# Patient Record
Sex: MALE | Race: WHITE | Employment: OTHER | URBAN - METROPOLITAN AREA
[De-identification: names, ages, dates, MRNs, and addresses within clinical notes are randomized per-mention and may not be internally consistent; named-entity substitution may affect disease eponyms.]

---

## 2020-01-01 ENCOUNTER — APPOINTMENT (OUTPATIENT)
Dept: GENERAL RADIOLOGY | Age: 85
DRG: 871 | End: 2020-01-01
Payer: MEDICARE

## 2020-01-01 ENCOUNTER — APPOINTMENT (OUTPATIENT)
Dept: CT IMAGING | Age: 85
DRG: 871 | End: 2020-01-01
Payer: MEDICARE

## 2020-01-01 ENCOUNTER — HOSPITAL ENCOUNTER (INPATIENT)
Age: 85
LOS: 15 days | Discharge: NURSING, REHAB OR LONG-TERM CARE | DRG: 871 | End: 2021-01-09
Attending: EMERGENCY MEDICINE | Admitting: INTERNAL MEDICINE
Payer: MEDICARE

## 2020-01-01 ENCOUNTER — TELEPHONE (OUTPATIENT)
Dept: OTHER | Facility: CLINIC | Age: 85
End: 2020-01-01

## 2020-01-01 ENCOUNTER — HOSPITAL ENCOUNTER (INPATIENT)
Age: 85
LOS: 4 days | Discharge: NURSING, REHAB OR LONG-TERM CARE | DRG: 871 | End: 2020-12-18
Attending: EMERGENCY MEDICINE | Admitting: INTERNAL MEDICINE
Payer: MEDICARE

## 2020-01-01 VITALS
OXYGEN SATURATION: 95 % | WEIGHT: 187.39 LBS | HEIGHT: 75 IN | SYSTOLIC BLOOD PRESSURE: 111 MMHG | RESPIRATION RATE: 20 BRPM | BODY MASS INDEX: 23.3 KG/M2 | DIASTOLIC BLOOD PRESSURE: 63 MMHG | HEART RATE: 90 BPM | TEMPERATURE: 97.8 F

## 2020-01-01 DIAGNOSIS — D68.32 WARFARIN-INDUCED COAGULOPATHY (HCC): ICD-10-CM

## 2020-01-01 DIAGNOSIS — R77.8 ELEVATED TROPONIN: ICD-10-CM

## 2020-01-01 DIAGNOSIS — I48.0 PAROXYSMAL ATRIAL FIBRILLATION (HCC): ICD-10-CM

## 2020-01-01 DIAGNOSIS — T45.515A WARFARIN-INDUCED COAGULOPATHY (HCC): ICD-10-CM

## 2020-01-01 DIAGNOSIS — J18.9 PNEUMONIA DUE TO ORGANISM: Primary | ICD-10-CM

## 2020-01-01 LAB
-: ABNORMAL
ABSOLUTE EOS #: 0 K/UL (ref 0–0.4)
ABSOLUTE EOS #: 0 K/UL (ref 0–0.4)
ABSOLUTE EOS #: 0.06 K/UL (ref 0–0.44)
ABSOLUTE EOS #: 0.18 K/UL (ref 0–0.44)
ABSOLUTE EOS #: 0.23 K/UL (ref 0–0.44)
ABSOLUTE EOS #: 0.27 K/UL (ref 0–0.44)
ABSOLUTE EOS #: 0.28 K/UL (ref 0–0.44)
ABSOLUTE IMMATURE GRANULOCYTE: 0.18 K/UL (ref 0–0.3)
ABSOLUTE IMMATURE GRANULOCYTE: 0.26 K/UL (ref 0–0.3)
ABSOLUTE IMMATURE GRANULOCYTE: 0.27 K/UL (ref 0–0.3)
ABSOLUTE IMMATURE GRANULOCYTE: 0.32 K/UL (ref 0–0.3)
ABSOLUTE IMMATURE GRANULOCYTE: 0.44 K/UL (ref 0–0.3)
ABSOLUTE IMMATURE GRANULOCYTE: ABNORMAL K/UL (ref 0–0.3)
ABSOLUTE IMMATURE GRANULOCYTE: ABNORMAL K/UL (ref 0–0.3)
ABSOLUTE LYMPH #: 1.18 K/UL (ref 1.1–3.7)
ABSOLUTE LYMPH #: 1.25 K/UL (ref 1–4.8)
ABSOLUTE LYMPH #: 1.27 K/UL (ref 1.1–3.7)
ABSOLUTE LYMPH #: 1.27 K/UL (ref 1.1–3.7)
ABSOLUTE LYMPH #: 1.37 K/UL (ref 1.1–3.7)
ABSOLUTE LYMPH #: 1.4 K/UL (ref 1–4.8)
ABSOLUTE LYMPH #: 1.53 K/UL (ref 1.1–3.7)
ABSOLUTE MONO #: 0.36 K/UL (ref 0.1–0.8)
ABSOLUTE MONO #: 0.36 K/UL (ref 0.1–1.2)
ABSOLUTE MONO #: 0.36 K/UL (ref 0.1–1.2)
ABSOLUTE MONO #: 0.41 K/UL (ref 0.1–1.2)
ABSOLUTE MONO #: 0.44 K/UL (ref 0.1–1.2)
ABSOLUTE MONO #: 0.45 K/UL (ref 0.1–1.2)
ABSOLUTE MONO #: 0.7 K/UL (ref 0.1–1.2)
ALBUMIN SERPL-MCNC: 1.8 G/DL (ref 3.5–5.2)
ALBUMIN SERPL-MCNC: 2 G/DL (ref 3.5–5.2)
ALBUMIN SERPL-MCNC: 2.1 G/DL (ref 3.5–5.2)
ALBUMIN SERPL-MCNC: 2.2 G/DL (ref 3.5–5.2)
ALBUMIN SERPL-MCNC: 2.2 G/DL (ref 3.5–5.2)
ALBUMIN SERPL-MCNC: 3.1 G/DL (ref 3.5–5.2)
ALBUMIN SERPL-MCNC: 3.1 G/DL (ref 3.5–5.2)
ALBUMIN/GLOBULIN RATIO: 0.9 (ref 1–2.5)
ALBUMIN/GLOBULIN RATIO: 0.9 (ref 1–2.5)
ALP BLD-CCNC: 106 U/L (ref 40–129)
ALP BLD-CCNC: 90 U/L (ref 40–129)
ALT SERPL-CCNC: 12 U/L (ref 5–41)
ALT SERPL-CCNC: 28 U/L (ref 5–41)
AMORPHOUS: ABNORMAL
ANION GAP SERPL CALCULATED.3IONS-SCNC: 10 MMOL/L (ref 9–17)
ANION GAP SERPL CALCULATED.3IONS-SCNC: 11 MMOL/L (ref 9–17)
ANION GAP SERPL CALCULATED.3IONS-SCNC: 13 MMOL/L (ref 9–17)
ANION GAP SERPL CALCULATED.3IONS-SCNC: 15 MMOL/L (ref 9–17)
ANION GAP SERPL CALCULATED.3IONS-SCNC: 5 MMOL/L (ref 9–17)
ANION GAP SERPL CALCULATED.3IONS-SCNC: 8 MMOL/L (ref 9–17)
ANION GAP SERPL CALCULATED.3IONS-SCNC: 9 MMOL/L (ref 9–17)
AST SERPL-CCNC: 20 U/L
AST SERPL-CCNC: 27 U/L
BACTERIA: ABNORMAL
BASOPHILS # BLD: 0 % (ref 0–2)
BASOPHILS # BLD: 1 % (ref 0–2)
BASOPHILS ABSOLUTE: 0 K/UL (ref 0–0.2)
BASOPHILS ABSOLUTE: 0 K/UL (ref 0–0.2)
BASOPHILS ABSOLUTE: 0.03 K/UL (ref 0–0.2)
BASOPHILS ABSOLUTE: 0.04 K/UL (ref 0–0.2)
BASOPHILS ABSOLUTE: 0.06 K/UL (ref 0–0.2)
BASOPHILS ABSOLUTE: 0.07 K/UL (ref 0–0.2)
BASOPHILS ABSOLUTE: 0.09 K/UL (ref 0–0.2)
BILIRUB SERPL-MCNC: 0.63 MG/DL (ref 0.3–1.2)
BILIRUB SERPL-MCNC: 0.78 MG/DL (ref 0.3–1.2)
BILIRUBIN URINE: NEGATIVE
BNP INTERPRETATION: ABNORMAL
BNP INTERPRETATION: ABNORMAL
BUN BLDV-MCNC: 16 MG/DL (ref 8–23)
BUN BLDV-MCNC: 16 MG/DL (ref 8–23)
BUN BLDV-MCNC: 17 MG/DL (ref 8–23)
BUN BLDV-MCNC: 17 MG/DL (ref 8–23)
BUN BLDV-MCNC: 18 MG/DL (ref 8–23)
BUN BLDV-MCNC: 19 MG/DL (ref 8–23)
BUN BLDV-MCNC: 20 MG/DL (ref 8–23)
BUN BLDV-MCNC: 22 MG/DL (ref 8–23)
BUN BLDV-MCNC: 23 MG/DL (ref 8–23)
BUN BLDV-MCNC: 24 MG/DL (ref 8–23)
BUN BLDV-MCNC: 25 MG/DL (ref 8–23)
BUN BLDV-MCNC: 30 MG/DL (ref 8–23)
BUN/CREAT BLD: ABNORMAL (ref 9–20)
CALCIUM SERPL-MCNC: 8.4 MG/DL (ref 8.6–10.4)
CALCIUM SERPL-MCNC: 8.5 MG/DL (ref 8.6–10.4)
CALCIUM SERPL-MCNC: 8.7 MG/DL (ref 8.6–10.4)
CALCIUM SERPL-MCNC: 8.8 MG/DL (ref 8.6–10.4)
CALCIUM SERPL-MCNC: 8.8 MG/DL (ref 8.6–10.4)
CALCIUM SERPL-MCNC: 9 MG/DL (ref 8.6–10.4)
CALCIUM SERPL-MCNC: 9.2 MG/DL (ref 8.6–10.4)
CALCIUM SERPL-MCNC: 9.2 MG/DL (ref 8.6–10.4)
CALCIUM SERPL-MCNC: 9.3 MG/DL (ref 8.6–10.4)
CALCIUM SERPL-MCNC: 9.4 MG/DL (ref 8.6–10.4)
CALCIUM SERPL-MCNC: 9.5 MG/DL (ref 8.6–10.4)
CALCIUM SERPL-MCNC: 9.7 MG/DL (ref 8.6–10.4)
CASTS UA: ABNORMAL /LPF
CHLORIDE BLD-SCNC: 100 MMOL/L (ref 98–107)
CHLORIDE BLD-SCNC: 100 MMOL/L (ref 98–107)
CHLORIDE BLD-SCNC: 101 MMOL/L (ref 98–107)
CHLORIDE BLD-SCNC: 102 MMOL/L (ref 98–107)
CHLORIDE BLD-SCNC: 102 MMOL/L (ref 98–107)
CHLORIDE BLD-SCNC: 104 MMOL/L (ref 98–107)
CHLORIDE BLD-SCNC: 94 MMOL/L (ref 98–107)
CHLORIDE BLD-SCNC: 95 MMOL/L (ref 98–107)
CHLORIDE BLD-SCNC: 98 MMOL/L (ref 98–107)
CHLORIDE BLD-SCNC: 98 MMOL/L (ref 98–107)
CHLORIDE BLD-SCNC: 99 MMOL/L (ref 98–107)
CHLORIDE BLD-SCNC: 99 MMOL/L (ref 98–107)
CO2: 25 MMOL/L (ref 20–31)
CO2: 26 MMOL/L (ref 20–31)
CO2: 27 MMOL/L (ref 20–31)
CO2: 28 MMOL/L (ref 20–31)
CO2: 29 MMOL/L (ref 20–31)
CO2: 30 MMOL/L (ref 20–31)
CO2: 32 MMOL/L (ref 20–31)
CO2: 33 MMOL/L (ref 20–31)
COLOR: YELLOW
COMMENT UA: ABNORMAL
CREAT SERPL-MCNC: 0.72 MG/DL (ref 0.7–1.2)
CREAT SERPL-MCNC: 0.74 MG/DL (ref 0.7–1.2)
CREAT SERPL-MCNC: 0.74 MG/DL (ref 0.7–1.2)
CREAT SERPL-MCNC: 0.82 MG/DL (ref 0.7–1.2)
CREAT SERPL-MCNC: 0.83 MG/DL (ref 0.7–1.2)
CREAT SERPL-MCNC: 0.85 MG/DL (ref 0.7–1.2)
CREAT SERPL-MCNC: 0.85 MG/DL (ref 0.7–1.2)
CREAT SERPL-MCNC: 0.88 MG/DL (ref 0.7–1.2)
CREAT SERPL-MCNC: 0.94 MG/DL (ref 0.7–1.2)
CREAT SERPL-MCNC: 0.96 MG/DL (ref 0.7–1.2)
CREAT SERPL-MCNC: 0.99 MG/DL (ref 0.7–1.2)
CREAT SERPL-MCNC: 1.16 MG/DL (ref 0.7–1.2)
CRYSTALS, UA: ABNORMAL /HPF
CULTURE: ABNORMAL
CULTURE: NORMAL
D-DIMER QUANTITATIVE: 1.04 MG/L FEU
DIFFERENTIAL TYPE: ABNORMAL
DIGOXIN DATE LAST DOSE: NORMAL
DIGOXIN DATE LAST DOSE: NORMAL
DIGOXIN DOSE AMOUNT: NORMAL
DIGOXIN DOSE AMOUNT: NORMAL
DIGOXIN DOSE TIME: NORMAL
DIGOXIN DOSE TIME: NORMAL
DIGOXIN LEVEL: 0.8 NG/ML (ref 0.5–2)
DIGOXIN LEVEL: 0.8 NG/ML (ref 0.5–2)
EKG ATRIAL RATE: 65 BPM
EKG P AXIS: 111 DEGREES
EKG P-R INTERVAL: 226 MS
EKG Q-T INTERVAL: 458 MS
EKG QRS DURATION: 86 MS
EKG QTC CALCULATION (BAZETT): 476 MS
EKG R AXIS: -15 DEGREES
EKG T AXIS: -65 DEGREES
EKG VENTRICULAR RATE: 65 BPM
EOSINOPHILS RELATIVE PERCENT: 0 % (ref 1–4)
EOSINOPHILS RELATIVE PERCENT: 1 % (ref 1–4)
EOSINOPHILS RELATIVE PERCENT: 2 % (ref 1–4)
EOSINOPHILS RELATIVE PERCENT: 2 % (ref 1–4)
EOSINOPHILS RELATIVE PERCENT: 3 % (ref 1–4)
EPITHELIAL CELLS UA: ABNORMAL /HPF (ref 0–5)
FERRITIN: 837 UG/L (ref 30–400)
FOLATE: 5.5 NG/ML
GFR AFRICAN AMERICAN: >60 ML/MIN
GFR NON-AFRICAN AMERICAN: >60 ML/MIN
GFR SERPL CREATININE-BSD FRML MDRD: ABNORMAL ML/MIN/{1.73_M2}
GLUCOSE BLD-MCNC: 100 MG/DL (ref 70–99)
GLUCOSE BLD-MCNC: 103 MG/DL (ref 70–99)
GLUCOSE BLD-MCNC: 104 MG/DL (ref 70–99)
GLUCOSE BLD-MCNC: 107 MG/DL (ref 70–99)
GLUCOSE BLD-MCNC: 108 MG/DL (ref 70–99)
GLUCOSE BLD-MCNC: 111 MG/DL (ref 70–99)
GLUCOSE BLD-MCNC: 114 MG/DL (ref 70–99)
GLUCOSE BLD-MCNC: 116 MG/DL (ref 70–99)
GLUCOSE BLD-MCNC: 126 MG/DL (ref 75–110)
GLUCOSE BLD-MCNC: 127 MG/DL (ref 75–110)
GLUCOSE BLD-MCNC: 128 MG/DL (ref 70–99)
GLUCOSE BLD-MCNC: 133 MG/DL (ref 70–99)
GLUCOSE BLD-MCNC: 146 MG/DL (ref 70–99)
GLUCOSE BLD-MCNC: 96 MG/DL (ref 70–99)
GLUCOSE BLD-MCNC: 98 MG/DL (ref 75–110)
GLUCOSE URINE: NEGATIVE
HCT VFR BLD CALC: 27.9 % (ref 40.7–50.3)
HCT VFR BLD CALC: 27.9 % (ref 41–53)
HCT VFR BLD CALC: 28 % (ref 41–53)
HCT VFR BLD CALC: 28.5 % (ref 40.7–50.3)
HCT VFR BLD CALC: 29.3 % (ref 41–53)
HCT VFR BLD CALC: 31.3 % (ref 40.7–50.3)
HCT VFR BLD CALC: 31.4 % (ref 40.7–50.3)
HCT VFR BLD CALC: 31.6 % (ref 40.7–50.3)
HCT VFR BLD CALC: 31.7 % (ref 41–53)
HCT VFR BLD CALC: 32 % (ref 41–53)
HCT VFR BLD CALC: 32.1 % (ref 40.7–50.3)
HCT VFR BLD CALC: 33.7 % (ref 41–53)
HEMOGLOBIN: 10.3 G/DL (ref 13.5–17.5)
HEMOGLOBIN: 10.3 G/DL (ref 13.5–17.5)
HEMOGLOBIN: 10.8 G/DL (ref 13.5–17.5)
HEMOGLOBIN: 8.5 G/DL (ref 13–17)
HEMOGLOBIN: 9 G/DL (ref 13.5–17.5)
HEMOGLOBIN: 9 G/DL (ref 13.5–17.5)
HEMOGLOBIN: 9 G/DL (ref 13–17)
HEMOGLOBIN: 9 G/DL (ref 13–17)
HEMOGLOBIN: 9.1 G/DL (ref 13.5–17.5)
HEMOGLOBIN: 9.5 G/DL (ref 13–17)
HEMOGLOBIN: 9.5 G/DL (ref 13–17)
HEMOGLOBIN: 9.6 G/DL (ref 13–17)
IMMATURE GRANULOCYTES: 1 %
IMMATURE GRANULOCYTES: 2 %
IMMATURE GRANULOCYTES: 2 %
IMMATURE GRANULOCYTES: 4 %
IMMATURE GRANULOCYTES: 4 %
IMMATURE GRANULOCYTES: ABNORMAL %
IMMATURE GRANULOCYTES: ABNORMAL %
INR BLD: 1.1
INR BLD: 1.2
INR BLD: 1.2
INR BLD: 1.3
INR BLD: 1.4
INR BLD: 1.7
INR BLD: 1.9
INR BLD: 1.9
INR BLD: 2.1
INR BLD: 2.1
INR BLD: 2.3
INR BLD: >14.8
INTERVENTION: NORMAL
IRON SATURATION: 23 % (ref 20–55)
IRON: 25 UG/DL (ref 59–158)
KETONES, URINE: NEGATIVE
LACTIC ACID, SEPSIS WHOLE BLOOD: 2 MMOL/L (ref 0.5–1.9)
LACTIC ACID, SEPSIS WHOLE BLOOD: 3.5 MMOL/L (ref 0.5–1.9)
LACTIC ACID, SEPSIS WHOLE BLOOD: NORMAL MMOL/L (ref 0.5–1.9)
LACTIC ACID, SEPSIS: 1.1 MMOL/L (ref 0.5–1.9)
LACTIC ACID, SEPSIS: 1.2 MMOL/L (ref 0.5–1.9)
LACTIC ACID, SEPSIS: 1.9 MMOL/L (ref 0.5–1.9)
LACTIC ACID, SEPSIS: ABNORMAL MMOL/L (ref 0.5–1.9)
LACTIC ACID, SEPSIS: ABNORMAL MMOL/L (ref 0.5–1.9)
LACTIC ACID, WHOLE BLOOD: 1.7 MMOL/L (ref 0.7–2.1)
LEGIONELLA PNEUMOPHILIA AG, URINE: NEGATIVE
LEUKOCYTE ESTERASE, URINE: NEGATIVE
LYMPHOCYTES # BLD: 11 % (ref 24–43)
LYMPHOCYTES # BLD: 11 % (ref 24–43)
LYMPHOCYTES # BLD: 14 % (ref 24–43)
LYMPHOCYTES # BLD: 7 % (ref 24–44)
LYMPHOCYTES # BLD: 7 % (ref 24–44)
LYMPHOCYTES # BLD: 8 % (ref 24–43)
LYMPHOCYTES # BLD: 8 % (ref 24–43)
Lab: ABNORMAL
Lab: ABNORMAL
Lab: NORMAL
MAGNESIUM: 2 MG/DL (ref 1.6–2.6)
MAGNESIUM: 2.1 MG/DL (ref 1.6–2.6)
MAGNESIUM: 2.1 MG/DL (ref 1.6–2.6)
MAGNESIUM: 2.2 MG/DL (ref 1.6–2.6)
MAGNESIUM: 2.2 MG/DL (ref 1.6–2.6)
MAGNESIUM: 2.3 MG/DL (ref 1.6–2.6)
MAGNESIUM: 2.6 MG/DL (ref 1.6–2.6)
MCH RBC QN AUTO: 28.5 PG (ref 25.2–33.5)
MCH RBC QN AUTO: 28.5 PG (ref 26–34)
MCH RBC QN AUTO: 28.7 PG (ref 25.2–33.5)
MCH RBC QN AUTO: 28.8 PG (ref 26–34)
MCH RBC QN AUTO: 29 PG (ref 25.2–33.5)
MCH RBC QN AUTO: 29 PG (ref 25.2–33.5)
MCH RBC QN AUTO: 29 PG (ref 26–34)
MCH RBC QN AUTO: 29.2 PG (ref 26–34)
MCH RBC QN AUTO: 29.3 PG (ref 25.2–33.5)
MCH RBC QN AUTO: 29.5 PG (ref 25.2–33.5)
MCHC RBC AUTO-ENTMCNC: 28.8 G/DL (ref 28.4–34.8)
MCHC RBC AUTO-ENTMCNC: 29.6 G/DL (ref 28.4–34.8)
MCHC RBC AUTO-ENTMCNC: 30.1 G/DL (ref 28.4–34.8)
MCHC RBC AUTO-ENTMCNC: 30.5 G/DL (ref 28.4–34.8)
MCHC RBC AUTO-ENTMCNC: 30.6 G/DL (ref 28.4–34.8)
MCHC RBC AUTO-ENTMCNC: 31.1 G/DL (ref 31–37)
MCHC RBC AUTO-ENTMCNC: 31.6 G/DL (ref 28.4–34.8)
MCHC RBC AUTO-ENTMCNC: 32.1 G/DL (ref 31–37)
MCHC RBC AUTO-ENTMCNC: 32.1 G/DL (ref 31–37)
MCHC RBC AUTO-ENTMCNC: 32.3 G/DL (ref 31–37)
MCHC RBC AUTO-ENTMCNC: 32.3 G/DL (ref 31–37)
MCHC RBC AUTO-ENTMCNC: 32.6 G/DL (ref 31–37)
MCV RBC AUTO: 101 FL (ref 82.6–102.9)
MCV RBC AUTO: 88.6 FL (ref 80–100)
MCV RBC AUTO: 89.1 FL (ref 80–100)
MCV RBC AUTO: 89.5 FL (ref 80–100)
MCV RBC AUTO: 90.4 FL (ref 80–100)
MCV RBC AUTO: 90.5 FL (ref 80–100)
MCV RBC AUTO: 91.8 FL (ref 80–100)
MCV RBC AUTO: 93.4 FL (ref 82.6–102.9)
MCV RBC AUTO: 94.9 FL (ref 82.6–102.9)
MCV RBC AUTO: 94.9 FL (ref 82.6–102.9)
MCV RBC AUTO: 96.2 FL (ref 82.6–102.9)
MCV RBC AUTO: 97 FL (ref 82.6–102.9)
MONOCYTES # BLD: 2 % (ref 1–7)
MONOCYTES # BLD: 2 % (ref 3–12)
MONOCYTES # BLD: 3 % (ref 2–11)
MONOCYTES # BLD: 3 % (ref 3–12)
MONOCYTES # BLD: 4 % (ref 3–12)
MORPHOLOGY: NORMAL
MUCUS: ABNORMAL
MYCOPLASMA PNEUMONIAE IGM: 0.08
NITRITE, URINE: NEGATIVE
NRBC AUTOMATED: 0 PER 100 WBC
NRBC AUTOMATED: ABNORMAL PER 100 WBC
OTHER OBSERVATIONS UA: ABNORMAL
PARTIAL THROMBOPLASTIN TIME: 37.4 SEC (ref 21.3–31.3)
PARTIAL THROMBOPLASTIN TIME: 84.4 SEC (ref 21.3–31.3)
PDW BLD-RTO: 18.6 % (ref 12.5–15.4)
PDW BLD-RTO: 18.7 % (ref 12.5–15.4)
PDW BLD-RTO: 18.9 % (ref 12.5–15.4)
PDW BLD-RTO: 19.2 % (ref 11.8–14.4)
PDW BLD-RTO: 19.5 % (ref 11.8–14.4)
PDW BLD-RTO: 19.6 % (ref 11.8–14.4)
PDW BLD-RTO: 19.6 % (ref 12.5–15.4)
PDW BLD-RTO: 19.7 % (ref 11.8–14.4)
PDW BLD-RTO: 19.8 % (ref 11.8–14.4)
PDW BLD-RTO: 20.1 % (ref 11.8–14.4)
PH UA: 5.5 (ref 5–8)
PHOSPHORUS: 2.5 MG/DL (ref 2.5–4.5)
PHOSPHORUS: 2.7 MG/DL (ref 2.5–4.5)
PHOSPHORUS: 2.8 MG/DL (ref 2.5–4.5)
PHOSPHORUS: 2.8 MG/DL (ref 2.5–4.5)
PHOSPHORUS: 3 MG/DL (ref 2.5–4.5)
PLATELET # BLD: 227 K/UL (ref 138–453)
PLATELET # BLD: 250 K/UL (ref 138–453)
PLATELET # BLD: 257 K/UL (ref 138–453)
PLATELET # BLD: 273 K/UL (ref 138–453)
PLATELET # BLD: 286 K/UL (ref 138–453)
PLATELET # BLD: 320 K/UL (ref 138–453)
PLATELET # BLD: 339 K/UL (ref 140–450)
PLATELET # BLD: 395 K/UL (ref 140–450)
PLATELET # BLD: 416 K/UL (ref 140–450)
PLATELET # BLD: 429 K/UL (ref 140–450)
PLATELET # BLD: 456 K/UL (ref 140–450)
PLATELET # BLD: 464 K/UL (ref 140–450)
PLATELET ESTIMATE: ABNORMAL
PMV BLD AUTO: 10.1 FL (ref 8.1–13.5)
PMV BLD AUTO: 10.2 FL (ref 8.1–13.5)
PMV BLD AUTO: 10.6 FL (ref 8.1–13.5)
PMV BLD AUTO: 10.9 FL (ref 8.1–13.5)
PMV BLD AUTO: 11 FL (ref 8.1–13.5)
PMV BLD AUTO: 11 FL (ref 8.1–13.5)
PMV BLD AUTO: 7.4 FL (ref 6–12)
PMV BLD AUTO: 7.6 FL (ref 6–12)
PMV BLD AUTO: 7.8 FL (ref 6–12)
PMV BLD AUTO: 7.8 FL (ref 6–12)
PMV BLD AUTO: 7.9 FL (ref 6–12)
PMV BLD AUTO: 8.2 FL (ref 6–12)
POTASSIUM SERPL-SCNC: 3 MMOL/L (ref 3.7–5.3)
POTASSIUM SERPL-SCNC: 3.4 MMOL/L (ref 3.7–5.3)
POTASSIUM SERPL-SCNC: 3.5 MMOL/L (ref 3.7–5.3)
POTASSIUM SERPL-SCNC: 3.7 MMOL/L (ref 3.7–5.3)
POTASSIUM SERPL-SCNC: 3.8 MMOL/L (ref 3.7–5.3)
POTASSIUM SERPL-SCNC: 3.9 MMOL/L (ref 3.7–5.3)
POTASSIUM SERPL-SCNC: 4 MMOL/L (ref 3.7–5.3)
POTASSIUM SERPL-SCNC: 4.1 MMOL/L (ref 3.7–5.3)
PRO-BNP: 488 PG/ML
PRO-BNP: 615 PG/ML
PROCALCITONIN: 0.18 NG/ML
PROCALCITONIN: 0.21 NG/ML
PROTEIN UA: NEGATIVE
PROTHROMBIN TIME: 12 SEC (ref 9–12)
PROTHROMBIN TIME: 12.1 SEC (ref 9–12)
PROTHROMBIN TIME: 12.4 SEC (ref 9–12)
PROTHROMBIN TIME: 13.5 SEC (ref 9.4–12.6)
PROTHROMBIN TIME: 13.5 SEC (ref 9–12)
PROTHROMBIN TIME: 13.7 SEC (ref 9.4–12.6)
PROTHROMBIN TIME: 14.6 SEC (ref 9–12)
PROTHROMBIN TIME: 17.8 SEC (ref 9.4–12.6)
PROTHROMBIN TIME: 19.9 SEC (ref 9.4–12.6)
PROTHROMBIN TIME: 20.1 SEC (ref 9.4–12.6)
PROTHROMBIN TIME: 21 SEC (ref 9–12)
PROTHROMBIN TIME: 21.6 SEC (ref 9–12)
PROTHROMBIN TIME: 23.9 SEC (ref 9.4–12.6)
PROTHROMBIN TIME: >150 SEC (ref 9.4–12.6)
RBC # BLD: 2.9 M/UL (ref 4.21–5.77)
RBC # BLD: 3.05 M/UL (ref 4.21–5.77)
RBC # BLD: 3.09 M/UL (ref 4.5–5.9)
RBC # BLD: 3.1 M/UL (ref 4.21–5.77)
RBC # BLD: 3.14 M/UL (ref 4.5–5.9)
RBC # BLD: 3.19 M/UL (ref 4.5–5.9)
RBC # BLD: 3.31 M/UL (ref 4.21–5.77)
RBC # BLD: 3.31 M/UL (ref 4.21–5.77)
RBC # BLD: 3.33 M/UL (ref 4.21–5.77)
RBC # BLD: 3.57 M/UL (ref 4.5–5.9)
RBC # BLD: 3.58 M/UL (ref 4.5–5.9)
RBC # BLD: 3.72 M/UL (ref 4.5–5.9)
RBC # BLD: ABNORMAL 10*6/UL
RBC UA: ABNORMAL /HPF (ref 0–2)
RENAL EPITHELIAL, UA: ABNORMAL /HPF
SARS-COV-2, RAPID: NOT DETECTED
SARS-COV-2, RAPID: NOT DETECTED
SARS-COV-2: NORMAL
SEG NEUTROPHILS: 74 % (ref 36–65)
SEG NEUTROPHILS: 79 % (ref 36–65)
SEG NEUTROPHILS: 82 % (ref 36–65)
SEG NEUTROPHILS: 85 % (ref 36–65)
SEG NEUTROPHILS: 89 % (ref 36–65)
SEG NEUTROPHILS: 90 % (ref 36–66)
SEG NEUTROPHILS: 91 % (ref 36–66)
SEGMENTED NEUTROPHILS ABSOLUTE COUNT: 12 K/UL (ref 1.5–8.1)
SEGMENTED NEUTROPHILS ABSOLUTE COUNT: 12.16 K/UL (ref 1.5–8.1)
SEGMENTED NEUTROPHILS ABSOLUTE COUNT: 14.72 K/UL (ref 1.5–8.1)
SEGMENTED NEUTROPHILS ABSOLUTE COUNT: 16.19 K/UL (ref 1.8–7.7)
SEGMENTED NEUTROPHILS ABSOLUTE COUNT: 19.4 K/UL (ref 1.8–7.7)
SEGMENTED NEUTROPHILS ABSOLUTE COUNT: 6.88 K/UL (ref 1.5–8.1)
SEGMENTED NEUTROPHILS ABSOLUTE COUNT: 9.58 K/UL (ref 1.5–8.1)
SODIUM BLD-SCNC: 133 MMOL/L (ref 135–144)
SODIUM BLD-SCNC: 134 MMOL/L (ref 135–144)
SODIUM BLD-SCNC: 135 MMOL/L (ref 135–144)
SODIUM BLD-SCNC: 136 MMOL/L (ref 135–144)
SODIUM BLD-SCNC: 137 MMOL/L (ref 135–144)
SODIUM BLD-SCNC: 138 MMOL/L (ref 135–144)
SODIUM BLD-SCNC: 138 MMOL/L (ref 135–144)
SODIUM BLD-SCNC: 139 MMOL/L (ref 135–144)
SODIUM BLD-SCNC: 139 MMOL/L (ref 135–144)
SODIUM BLD-SCNC: 140 MMOL/L (ref 135–144)
SODIUM BLD-SCNC: 143 MMOL/L (ref 135–144)
SOURCE: NORMAL
SPECIFIC GRAVITY UA: 1.02 (ref 1–1.03)
SPECIMEN DESCRIPTION: ABNORMAL
SPECIMEN DESCRIPTION: ABNORMAL
SPECIMEN DESCRIPTION: NORMAL
STREP PNEUMONIAE ANTIGEN: NEGATIVE
TOTAL IRON BINDING CAPACITY: 111 UG/DL (ref 250–450)
TOTAL PROTEIN: 6.4 G/DL (ref 6.4–8.3)
TOTAL PROTEIN: 6.6 G/DL (ref 6.4–8.3)
TRICHOMONAS: ABNORMAL
TROPONIN INTERP: ABNORMAL
TROPONIN T: ABNORMAL NG/ML
TROPONIN, HIGH SENSITIVITY: 29 NG/L (ref 0–22)
TROPONIN, HIGH SENSITIVITY: 30 NG/L (ref 0–22)
TROPONIN, HIGH SENSITIVITY: 32 NG/L (ref 0–22)
TROPONIN, HIGH SENSITIVITY: 33 NG/L (ref 0–22)
TROPONIN, HIGH SENSITIVITY: 34 NG/L (ref 0–22)
TROPONIN, HIGH SENSITIVITY: 34 NG/L (ref 0–22)
TROPONIN, HIGH SENSITIVITY: 36 NG/L (ref 0–22)
TROPONIN, HIGH SENSITIVITY: 36 NG/L (ref 0–22)
TROPONIN, HIGH SENSITIVITY: 38 NG/L (ref 0–22)
TROPONIN, HIGH SENSITIVITY: 38 NG/L (ref 0–22)
TROPONIN, HIGH SENSITIVITY: 39 NG/L (ref 0–22)
TROPONIN, HIGH SENSITIVITY: 40 NG/L (ref 0–22)
TROPONIN, HIGH SENSITIVITY: 40 NG/L (ref 0–22)
TROPONIN, HIGH SENSITIVITY: 41 NG/L (ref 0–22)
TROPONIN, HIGH SENSITIVITY: 42 NG/L (ref 0–22)
TROPONIN, HIGH SENSITIVITY: 43 NG/L (ref 0–22)
TROPONIN, HIGH SENSITIVITY: 45 NG/L (ref 0–22)
TURBIDITY: CLEAR
UNSATURATED IRON BINDING CAPACITY: 86 UG/DL (ref 112–347)
URINE HGB: ABNORMAL
UROBILINOGEN, URINE: NORMAL
VITAMIN B-12: 381 PG/ML (ref 232–1245)
VITAMIN D 25-HYDROXY: 54.2 NG/ML (ref 30–100)
WBC # BLD: 12 K/UL (ref 3.5–11.3)
WBC # BLD: 14.3 K/UL (ref 3.5–11.3)
WBC # BLD: 14.5 K/UL (ref 3.5–11.3)
WBC # BLD: 15 K/UL (ref 3.5–11)
WBC # BLD: 15.5 K/UL (ref 3.5–11)
WBC # BLD: 16.4 K/UL (ref 3.5–11)
WBC # BLD: 16.7 K/UL (ref 3.5–11)
WBC # BLD: 16.8 K/UL (ref 3.5–11.3)
WBC # BLD: 17.8 K/UL (ref 3.5–11)
WBC # BLD: 20.6 K/UL (ref 3.5–11.3)
WBC # BLD: 21.6 K/UL (ref 3.5–11)
WBC # BLD: 9.2 K/UL (ref 3.5–11.3)
WBC # BLD: ABNORMAL 10*3/UL
WBC UA: ABNORMAL /HPF (ref 0–5)
YEAST: ABNORMAL

## 2020-01-01 PROCEDURE — 87086 URINE CULTURE/COLONY COUNT: CPT

## 2020-01-01 PROCEDURE — 6360000002 HC RX W HCPCS: Performed by: INTERNAL MEDICINE

## 2020-01-01 PROCEDURE — 2700000000 HC OXYGEN THERAPY PER DAY

## 2020-01-01 PROCEDURE — 2580000003 HC RX 258: Performed by: NURSE PRACTITIONER

## 2020-01-01 PROCEDURE — 2060000000 HC ICU INTERMEDIATE R&B

## 2020-01-01 PROCEDURE — 36415 COLL VENOUS BLD VENIPUNCTURE: CPT

## 2020-01-01 PROCEDURE — 87040 BLOOD CULTURE FOR BACTERIA: CPT

## 2020-01-01 PROCEDURE — 85025 COMPLETE CBC W/AUTO DIFF WBC: CPT

## 2020-01-01 PROCEDURE — 2580000003 HC RX 258: Performed by: EMERGENCY MEDICINE

## 2020-01-01 PROCEDURE — 6370000000 HC RX 637 (ALT 250 FOR IP): Performed by: NURSE PRACTITIONER

## 2020-01-01 PROCEDURE — 97535 SELF CARE MNGMENT TRAINING: CPT

## 2020-01-01 PROCEDURE — 83605 ASSAY OF LACTIC ACID: CPT

## 2020-01-01 PROCEDURE — 71045 X-RAY EXAM CHEST 1 VIEW: CPT

## 2020-01-01 PROCEDURE — 94640 AIRWAY INHALATION TREATMENT: CPT

## 2020-01-01 PROCEDURE — 99232 SBSQ HOSP IP/OBS MODERATE 35: CPT | Performed by: FAMILY MEDICINE

## 2020-01-01 PROCEDURE — 2580000003 HC RX 258: Performed by: INTERNAL MEDICINE

## 2020-01-01 PROCEDURE — 80053 COMPREHEN METABOLIC PANEL: CPT

## 2020-01-01 PROCEDURE — 83735 ASSAY OF MAGNESIUM: CPT

## 2020-01-01 PROCEDURE — 97110 THERAPEUTIC EXERCISES: CPT

## 2020-01-01 PROCEDURE — 80048 BASIC METABOLIC PNL TOTAL CA: CPT

## 2020-01-01 PROCEDURE — 87899 AGENT NOS ASSAY W/OPTIC: CPT

## 2020-01-01 PROCEDURE — 85610 PROTHROMBIN TIME: CPT

## 2020-01-01 PROCEDURE — 6370000000 HC RX 637 (ALT 250 FOR IP): Performed by: INTERNAL MEDICINE

## 2020-01-01 PROCEDURE — 85379 FIBRIN DEGRADATION QUANT: CPT

## 2020-01-01 PROCEDURE — APPSS180 APP SPLIT SHARED TIME > 60 MINUTES: Performed by: NURSE PRACTITIONER

## 2020-01-01 PROCEDURE — 94760 N-INVAS EAR/PLS OXIMETRY 1: CPT

## 2020-01-01 PROCEDURE — 99232 SBSQ HOSP IP/OBS MODERATE 35: CPT | Performed by: INTERNAL MEDICINE

## 2020-01-01 PROCEDURE — 51701 INSERT BLADDER CATHETER: CPT

## 2020-01-01 PROCEDURE — 6360000002 HC RX W HCPCS: Performed by: NURSE PRACTITIONER

## 2020-01-01 PROCEDURE — 80069 RENAL FUNCTION PANEL: CPT

## 2020-01-01 PROCEDURE — 99284 EMERGENCY DEPT VISIT MOD MDM: CPT

## 2020-01-01 PROCEDURE — 84484 ASSAY OF TROPONIN QUANT: CPT

## 2020-01-01 PROCEDURE — 51798 US URINE CAPACITY MEASURE: CPT

## 2020-01-01 PROCEDURE — 97166 OT EVAL MOD COMPLEX 45 MIN: CPT

## 2020-01-01 PROCEDURE — 82746 ASSAY OF FOLIC ACID SERUM: CPT

## 2020-01-01 PROCEDURE — 99223 1ST HOSP IP/OBS HIGH 75: CPT | Performed by: NURSE PRACTITIONER

## 2020-01-01 PROCEDURE — 80162 ASSAY OF DIGOXIN TOTAL: CPT

## 2020-01-01 PROCEDURE — 94664 DEMO&/EVAL PT USE INHALER: CPT

## 2020-01-01 PROCEDURE — 86738 MYCOPLASMA ANTIBODY: CPT

## 2020-01-01 PROCEDURE — U0003 INFECTIOUS AGENT DETECTION BY NUCLEIC ACID (DNA OR RNA); SEVERE ACUTE RESPIRATORY SYNDROME CORONAVIRUS 2 (SARS-COV-2) (CORONAVIRUS DISEASE [COVID-19]), AMPLIFIED PROBE TECHNIQUE, MAKING USE OF HIGH THROUGHPUT TECHNOLOGIES AS DESCRIBED BY CMS-2020-01-R: HCPCS

## 2020-01-01 PROCEDURE — 94667 MNPJ CHEST WALL 1ST: CPT

## 2020-01-01 PROCEDURE — 94761 N-INVAS EAR/PLS OXIMETRY MLT: CPT

## 2020-01-01 PROCEDURE — 96375 TX/PRO/DX INJ NEW DRUG ADDON: CPT

## 2020-01-01 PROCEDURE — 85730 THROMBOPLASTIN TIME PARTIAL: CPT

## 2020-01-01 PROCEDURE — 97530 THERAPEUTIC ACTIVITIES: CPT

## 2020-01-01 PROCEDURE — 96372 THER/PROPH/DIAG INJ SC/IM: CPT

## 2020-01-01 PROCEDURE — 82947 ASSAY GLUCOSE BLOOD QUANT: CPT

## 2020-01-01 PROCEDURE — APPSS45 APP SPLIT SHARED TIME 31-45 MINUTES: Performed by: NURSE PRACTITIONER

## 2020-01-01 PROCEDURE — U0002 COVID-19 LAB TEST NON-CDC: HCPCS

## 2020-01-01 PROCEDURE — 71260 CT THORAX DX C+: CPT

## 2020-01-01 PROCEDURE — 99222 1ST HOSP IP/OBS MODERATE 55: CPT | Performed by: INTERNAL MEDICINE

## 2020-01-01 PROCEDURE — 81001 URINALYSIS AUTO W/SCOPE: CPT

## 2020-01-01 PROCEDURE — 82607 VITAMIN B-12: CPT

## 2020-01-01 PROCEDURE — 71046 X-RAY EXAM CHEST 2 VIEWS: CPT

## 2020-01-01 PROCEDURE — 70450 CT HEAD/BRAIN W/O DYE: CPT

## 2020-01-01 PROCEDURE — 84145 PROCALCITONIN (PCT): CPT

## 2020-01-01 PROCEDURE — 82306 VITAMIN D 25 HYDROXY: CPT

## 2020-01-01 PROCEDURE — 76937 US GUIDE VASCULAR ACCESS: CPT

## 2020-01-01 PROCEDURE — 6360000004 HC RX CONTRAST MEDICATION: Performed by: EMERGENCY MEDICINE

## 2020-01-01 PROCEDURE — 96365 THER/PROPH/DIAG IV INF INIT: CPT

## 2020-01-01 PROCEDURE — 94668 MNPJ CHEST WALL SBSQ: CPT

## 2020-01-01 PROCEDURE — 99223 1ST HOSP IP/OBS HIGH 75: CPT | Performed by: INTERNAL MEDICINE

## 2020-01-01 PROCEDURE — 97116 GAIT TRAINING THERAPY: CPT

## 2020-01-01 PROCEDURE — 92611 MOTION FLUOROSCOPY/SWALLOW: CPT

## 2020-01-01 PROCEDURE — 97162 PT EVAL MOD COMPLEX 30 MIN: CPT

## 2020-01-01 PROCEDURE — 82805 BLOOD GASES W/O2 SATURATION: CPT

## 2020-01-01 PROCEDURE — 96366 THER/PROPH/DIAG IV INF ADDON: CPT

## 2020-01-01 PROCEDURE — 6360000002 HC RX W HCPCS: Performed by: FAMILY MEDICINE

## 2020-01-01 PROCEDURE — 6360000002 HC RX W HCPCS: Performed by: EMERGENCY MEDICINE

## 2020-01-01 PROCEDURE — 87150 DNA/RNA AMPLIFIED PROBE: CPT

## 2020-01-01 PROCEDURE — 85027 COMPLETE CBC AUTOMATED: CPT

## 2020-01-01 PROCEDURE — 84295 ASSAY OF SERUM SODIUM: CPT

## 2020-01-01 PROCEDURE — 83880 ASSAY OF NATRIURETIC PEPTIDE: CPT

## 2020-01-01 PROCEDURE — 83550 IRON BINDING TEST: CPT

## 2020-01-01 PROCEDURE — 96367 TX/PROPH/DG ADDL SEQ IV INF: CPT

## 2020-01-01 PROCEDURE — 87205 SMEAR GRAM STAIN: CPT

## 2020-01-01 PROCEDURE — 99233 SBSQ HOSP IP/OBS HIGH 50: CPT | Performed by: INTERNAL MEDICINE

## 2020-01-01 PROCEDURE — 83540 ASSAY OF IRON: CPT

## 2020-01-01 PROCEDURE — 82728 ASSAY OF FERRITIN: CPT

## 2020-01-01 PROCEDURE — 31720 CLEARANCE OF AIRWAYS: CPT

## 2020-01-01 PROCEDURE — 87449 NOS EACH ORGANISM AG IA: CPT

## 2020-01-01 PROCEDURE — 74230 X-RAY XM SWLNG FUNCJ C+: CPT

## 2020-01-01 PROCEDURE — 92610 EVALUATE SWALLOWING FUNCTION: CPT

## 2020-01-01 PROCEDURE — 93005 ELECTROCARDIOGRAM TRACING: CPT | Performed by: EMERGENCY MEDICINE

## 2020-01-01 RX ORDER — ACETAMINOPHEN 325 MG/1
650 TABLET ORAL EVERY 6 HOURS PRN
Status: DISCONTINUED | OUTPATIENT
Start: 2020-01-01 | End: 2020-01-01 | Stop reason: HOSPADM

## 2020-01-01 RX ORDER — PREDNISONE 20 MG/1
20 TABLET ORAL DAILY
Status: DISCONTINUED | OUTPATIENT
Start: 2020-01-01 | End: 2020-01-01 | Stop reason: HOSPADM

## 2020-01-01 RX ORDER — POLYETHYLENE GLYCOL 3350 17 G/17G
17 POWDER, FOR SOLUTION ORAL DAILY PRN
Status: DISCONTINUED | OUTPATIENT
Start: 2020-01-01 | End: 2020-01-01 | Stop reason: HOSPADM

## 2020-01-01 RX ORDER — ALBUTEROL SULFATE 2.5 MG/3ML
2.5 SOLUTION RESPIRATORY (INHALATION)
Status: DISCONTINUED | OUTPATIENT
Start: 2020-01-01 | End: 2021-01-01 | Stop reason: HOSPADM

## 2020-01-01 RX ORDER — PROMETHAZINE HYDROCHLORIDE 25 MG/1
12.5 TABLET ORAL EVERY 6 HOURS PRN
Status: DISCONTINUED | OUTPATIENT
Start: 2020-01-01 | End: 2020-01-01 | Stop reason: HOSPADM

## 2020-01-01 RX ORDER — 0.9 % SODIUM CHLORIDE 0.9 %
500 INTRAVENOUS SOLUTION INTRAVENOUS ONCE
Status: COMPLETED | OUTPATIENT
Start: 2020-01-01 | End: 2020-01-01

## 2020-01-01 RX ORDER — IPRATROPIUM BROMIDE AND ALBUTEROL SULFATE 2.5; .5 MG/3ML; MG/3ML
1 SOLUTION RESPIRATORY (INHALATION)
Status: DISCONTINUED | OUTPATIENT
Start: 2020-01-01 | End: 2020-01-01

## 2020-01-01 RX ORDER — DIGOXIN 125 MCG
125 TABLET ORAL DAILY
COMMUNITY

## 2020-01-01 RX ORDER — VITAMIN B COMPLEX
5 TABLET ORAL DAILY
Status: DISCONTINUED | OUTPATIENT
Start: 2020-01-01 | End: 2020-01-01 | Stop reason: HOSPADM

## 2020-01-01 RX ORDER — NYSTATIN 10B UNIT
POWDER (EA) MISCELLANEOUS 2 TIMES DAILY
COMMUNITY

## 2020-01-01 RX ORDER — GUAIFENESIN 600 MG/1
600 TABLET, EXTENDED RELEASE ORAL 2 TIMES DAILY
Status: DISCONTINUED | OUTPATIENT
Start: 2020-01-01 | End: 2020-01-01

## 2020-01-01 RX ORDER — IPRATROPIUM BROMIDE AND ALBUTEROL SULFATE 2.5; .5 MG/3ML; MG/3ML
1 SOLUTION RESPIRATORY (INHALATION) 4 TIMES DAILY
Status: DISCONTINUED | OUTPATIENT
Start: 2020-01-01 | End: 2020-01-01 | Stop reason: HOSPADM

## 2020-01-01 RX ORDER — ERGOCALCIFEROL (VITAMIN D2) 1250 MCG
50000 CAPSULE ORAL WEEKLY
COMMUNITY
Start: 2020-01-01 | End: 2020-01-01

## 2020-01-01 RX ORDER — ACETAMINOPHEN 650 MG/1
650 SUPPOSITORY RECTAL EVERY 6 HOURS PRN
Status: DISCONTINUED | OUTPATIENT
Start: 2020-01-01 | End: 2021-01-01 | Stop reason: HOSPADM

## 2020-01-01 RX ORDER — WARFARIN SODIUM 2 MG/1
2 TABLET ORAL DAILY
Qty: 30 TABLET | Refills: 1 | Status: SHIPPED | OUTPATIENT
Start: 2020-01-01

## 2020-01-01 RX ORDER — ONDANSETRON 2 MG/ML
4 INJECTION INTRAMUSCULAR; INTRAVENOUS EVERY 6 HOURS PRN
Status: DISCONTINUED | OUTPATIENT
Start: 2020-01-01 | End: 2021-01-01 | Stop reason: HOSPADM

## 2020-01-01 RX ORDER — PHYTONADIONE 10 MG/ML
10 INJECTION, EMULSION INTRAMUSCULAR; INTRAVENOUS; SUBCUTANEOUS ONCE
Status: COMPLETED | OUTPATIENT
Start: 2020-01-01 | End: 2020-01-01

## 2020-01-01 RX ORDER — FINASTERIDE 5 MG/1
5 TABLET, FILM COATED ORAL DAILY
COMMUNITY

## 2020-01-01 RX ORDER — WARFARIN SODIUM 5 MG/1
5 TABLET ORAL
Status: COMPLETED | OUTPATIENT
Start: 2020-01-01 | End: 2020-01-01

## 2020-01-01 RX ORDER — ACETAMINOPHEN 650 MG/1
650 SUPPOSITORY RECTAL EVERY 6 HOURS PRN
Status: DISCONTINUED | OUTPATIENT
Start: 2020-01-01 | End: 2020-01-01 | Stop reason: HOSPADM

## 2020-01-01 RX ORDER — WARFARIN SODIUM 1 MG/1
TABLET ORAL
Qty: 30 TABLET | Refills: 1 | Status: SHIPPED | OUTPATIENT
Start: 2020-01-01 | End: 2020-01-01 | Stop reason: HOSPADM

## 2020-01-01 RX ORDER — WARFARIN SODIUM 3 MG/1
3 TABLET ORAL
Status: COMPLETED | OUTPATIENT
Start: 2020-01-01 | End: 2020-01-01

## 2020-01-01 RX ORDER — ALPRAZOLAM 0.5 MG/1
0.25 TABLET ORAL 2 TIMES DAILY PRN
Status: DISCONTINUED | OUTPATIENT
Start: 2020-01-01 | End: 2020-01-01 | Stop reason: HOSPADM

## 2020-01-01 RX ORDER — SODIUM CHLORIDE 0.9 % (FLUSH) 0.9 %
10 SYRINGE (ML) INJECTION EVERY 12 HOURS SCHEDULED
Status: DISCONTINUED | OUTPATIENT
Start: 2020-01-01 | End: 2020-01-01 | Stop reason: HOSPADM

## 2020-01-01 RX ORDER — LEVOTHYROXINE SODIUM 0.07 MG/1
75 TABLET ORAL DAILY
Status: DISCONTINUED | OUTPATIENT
Start: 2020-01-01 | End: 2021-01-01 | Stop reason: HOSPADM

## 2020-01-01 RX ORDER — OXYCODONE HYDROCHLORIDE AND ACETAMINOPHEN 5; 325 MG/1; MG/1
1-2 TABLET ORAL EVERY 6 HOURS
Status: ON HOLD | COMMUNITY
Start: 2020-01-01 | End: 2021-01-01 | Stop reason: HOSPADM

## 2020-01-01 RX ORDER — POTASSIUM CHLORIDE 7.45 MG/ML
10 INJECTION INTRAVENOUS PRN
Status: DISCONTINUED | OUTPATIENT
Start: 2020-01-01 | End: 2020-01-01 | Stop reason: HOSPADM

## 2020-01-01 RX ORDER — GUAIFENESIN DEXTROMETHORPHAN HYDROBROMIDE ORAL SOLUTION 10; 100 MG/5ML; MG/5ML
10 SOLUTION ORAL EVERY 6 HOURS SCHEDULED
Status: DISCONTINUED | OUTPATIENT
Start: 2020-01-01 | End: 2021-01-01 | Stop reason: HOSPADM

## 2020-01-01 RX ORDER — DILTIAZEM HYDROCHLORIDE 120 MG/1
120 CAPSULE, COATED, EXTENDED RELEASE ORAL DAILY
Status: DISCONTINUED | OUTPATIENT
Start: 2020-01-01 | End: 2020-01-01 | Stop reason: HOSPADM

## 2020-01-01 RX ORDER — POTASSIUM CHLORIDE 20 MEQ/1
40 TABLET, EXTENDED RELEASE ORAL PRN
Status: DISCONTINUED | OUTPATIENT
Start: 2020-01-01 | End: 2020-01-01 | Stop reason: HOSPADM

## 2020-01-01 RX ORDER — NICOTINE 21 MG/24HR
1 PATCH, TRANSDERMAL 24 HOURS TRANSDERMAL DAILY PRN
Status: DISCONTINUED | OUTPATIENT
Start: 2020-01-01 | End: 2021-01-01 | Stop reason: HOSPADM

## 2020-01-01 RX ORDER — WARFARIN SODIUM 3 MG/1
3.5 TABLET ORAL DAILY
Status: ON HOLD | COMMUNITY
Start: 2020-01-01 | End: 2020-01-01 | Stop reason: HOSPADM

## 2020-01-01 RX ORDER — CIPROFLOXACIN 2 MG/ML
400 INJECTION, SOLUTION INTRAVENOUS ONCE
Status: COMPLETED | OUTPATIENT
Start: 2020-01-01 | End: 2020-01-01

## 2020-01-01 RX ORDER — PROMETHAZINE HYDROCHLORIDE 12.5 MG/1
12.5 TABLET ORAL EVERY 6 HOURS PRN
Status: DISCONTINUED | OUTPATIENT
Start: 2020-01-01 | End: 2021-01-01 | Stop reason: HOSPADM

## 2020-01-01 RX ORDER — PHYTONADIONE 10 MG/ML
10 INJECTION, EMULSION INTRAMUSCULAR; INTRAVENOUS; SUBCUTANEOUS ONCE
Status: DISCONTINUED | OUTPATIENT
Start: 2020-01-01 | End: 2020-01-01

## 2020-01-01 RX ORDER — WARFARIN SODIUM 2 MG/1
2 TABLET ORAL DAILY
Qty: 30 TABLET | Refills: 1 | DISCHARGE
Start: 2020-01-01 | End: 2020-01-01 | Stop reason: SDUPTHER

## 2020-01-01 RX ORDER — TAMSULOSIN HYDROCHLORIDE 0.4 MG/1
0.8 CAPSULE ORAL NIGHTLY
Status: DISCONTINUED | OUTPATIENT
Start: 2020-01-01 | End: 2020-01-01 | Stop reason: HOSPADM

## 2020-01-01 RX ORDER — LEVOFLOXACIN 5 MG/ML
750 INJECTION, SOLUTION INTRAVENOUS EVERY 24 HOURS
Status: DISCONTINUED | OUTPATIENT
Start: 2020-01-01 | End: 2020-01-01

## 2020-01-01 RX ORDER — SODIUM CHLORIDE 0.9 % (FLUSH) 0.9 %
10 SYRINGE (ML) INJECTION EVERY 12 HOURS SCHEDULED
Status: DISCONTINUED | OUTPATIENT
Start: 2020-01-01 | End: 2021-01-01 | Stop reason: HOSPADM

## 2020-01-01 RX ORDER — MIDODRINE HYDROCHLORIDE 5 MG/1
5 TABLET ORAL
Status: DISCONTINUED | OUTPATIENT
Start: 2020-01-01 | End: 2021-01-01 | Stop reason: HOSPADM

## 2020-01-01 RX ORDER — DILTIAZEM HYDROCHLORIDE 120 MG/1
120 CAPSULE, COATED, EXTENDED RELEASE ORAL DAILY
Status: DISCONTINUED | OUTPATIENT
Start: 2020-01-01 | End: 2021-01-01 | Stop reason: HOSPADM

## 2020-01-01 RX ORDER — MIDODRINE HYDROCHLORIDE 5 MG/1
5 TABLET ORAL ONCE
Status: COMPLETED | OUTPATIENT
Start: 2020-01-01 | End: 2020-01-01

## 2020-01-01 RX ORDER — LEVOTHYROXINE SODIUM 0.07 MG/1
75 TABLET ORAL DAILY
Status: DISCONTINUED | OUTPATIENT
Start: 2020-01-01 | End: 2020-01-01 | Stop reason: HOSPADM

## 2020-01-01 RX ORDER — ACETAMINOPHEN 325 MG/1
650 TABLET ORAL EVERY 4 HOURS PRN
COMMUNITY

## 2020-01-01 RX ORDER — 0.9 % SODIUM CHLORIDE 0.9 %
80 INTRAVENOUS SOLUTION INTRAVENOUS ONCE
Status: COMPLETED | OUTPATIENT
Start: 2020-01-01 | End: 2020-01-01

## 2020-01-01 RX ORDER — NICOTINE 21 MG/24HR
1 PATCH, TRANSDERMAL 24 HOURS TRANSDERMAL DAILY PRN
Status: DISCONTINUED | OUTPATIENT
Start: 2020-01-01 | End: 2020-01-01 | Stop reason: HOSPADM

## 2020-01-01 RX ORDER — DIGOXIN 125 MCG
125 TABLET ORAL DAILY
Status: DISCONTINUED | OUTPATIENT
Start: 2020-01-01 | End: 2021-01-01 | Stop reason: HOSPADM

## 2020-01-01 RX ORDER — ACETAMINOPHEN 325 MG/1
650 TABLET ORAL EVERY 6 HOURS PRN
Status: DISCONTINUED | OUTPATIENT
Start: 2020-01-01 | End: 2021-01-01 | Stop reason: HOSPADM

## 2020-01-01 RX ORDER — DOXYCYCLINE HYCLATE 100 MG
100 TABLET ORAL 2 TIMES DAILY
Qty: 14 TABLET | Refills: 0 | Status: ON HOLD | DISCHARGE
Start: 2020-01-01 | End: 2021-01-01 | Stop reason: HOSPADM

## 2020-01-01 RX ORDER — DOXYCYCLINE HYCLATE 100 MG
100 TABLET ORAL EVERY 12 HOURS SCHEDULED
Status: DISCONTINUED | OUTPATIENT
Start: 2020-01-01 | End: 2020-01-01 | Stop reason: HOSPADM

## 2020-01-01 RX ORDER — SODIUM CHLORIDE 0.9 % (FLUSH) 0.9 %
10 SYRINGE (ML) INJECTION PRN
Status: DISCONTINUED | OUTPATIENT
Start: 2020-01-01 | End: 2020-01-01

## 2020-01-01 RX ORDER — MORPHINE SULFATE 4 MG/ML
4 INJECTION, SOLUTION INTRAMUSCULAR; INTRAVENOUS ONCE
Status: COMPLETED | OUTPATIENT
Start: 2020-01-01 | End: 2020-01-01

## 2020-01-01 RX ORDER — IPRATROPIUM BROMIDE AND ALBUTEROL SULFATE 2.5; .5 MG/3ML; MG/3ML
1 SOLUTION RESPIRATORY (INHALATION)
Status: DISCONTINUED | OUTPATIENT
Start: 2020-01-01 | End: 2021-01-01 | Stop reason: HOSPADM

## 2020-01-01 RX ORDER — PREDNISONE 10 MG/1
10 TABLET ORAL DAILY
Qty: 4 TABLET | Refills: 0 | DISCHARGE
Start: 2020-01-01 | End: 2020-01-01

## 2020-01-01 RX ORDER — SODIUM CHLORIDE, SODIUM LACTATE, POTASSIUM CHLORIDE, AND CALCIUM CHLORIDE .6; .31; .03; .02 G/100ML; G/100ML; G/100ML; G/100ML
500 INJECTION, SOLUTION INTRAVENOUS ONCE
Status: COMPLETED | OUTPATIENT
Start: 2020-01-01 | End: 2020-01-01

## 2020-01-01 RX ORDER — HALOPERIDOL 5 MG/ML
2 INJECTION INTRAMUSCULAR ONCE
Status: COMPLETED | OUTPATIENT
Start: 2020-01-01 | End: 2020-01-01

## 2020-01-01 RX ORDER — 0.9 % SODIUM CHLORIDE 0.9 %
250 INTRAVENOUS SOLUTION INTRAVENOUS ONCE
Status: COMPLETED | OUTPATIENT
Start: 2020-01-01 | End: 2020-01-01

## 2020-01-01 RX ORDER — WARFARIN SODIUM 2 MG/1
2 TABLET ORAL
Status: COMPLETED | OUTPATIENT
Start: 2020-01-01 | End: 2020-01-01

## 2020-01-01 RX ORDER — UREA 10 %
3 LOTION (ML) TOPICAL ONCE
Status: COMPLETED | OUTPATIENT
Start: 2020-01-01 | End: 2020-01-01

## 2020-01-01 RX ORDER — METHYLPREDNISOLONE SODIUM SUCCINATE 40 MG/ML
40 INJECTION, POWDER, LYOPHILIZED, FOR SOLUTION INTRAMUSCULAR; INTRAVENOUS EVERY 6 HOURS
Status: DISCONTINUED | OUTPATIENT
Start: 2020-01-01 | End: 2020-01-01

## 2020-01-01 RX ORDER — MAGNESIUM SULFATE 1 G/100ML
1 INJECTION INTRAVENOUS PRN
Status: DISCONTINUED | OUTPATIENT
Start: 2020-01-01 | End: 2020-01-01 | Stop reason: HOSPADM

## 2020-01-01 RX ORDER — WARFARIN SODIUM 1 MG/1
2 TABLET ORAL
Status: DISCONTINUED | OUTPATIENT
Start: 2020-01-01 | End: 2020-01-01 | Stop reason: HOSPADM

## 2020-01-01 RX ORDER — CIPROFLOXACIN 2 MG/ML
400 INJECTION, SOLUTION INTRAVENOUS EVERY 12 HOURS
Status: DISCONTINUED | OUTPATIENT
Start: 2020-01-01 | End: 2020-01-01

## 2020-01-01 RX ORDER — TAMSULOSIN HYDROCHLORIDE 0.4 MG/1
0.8 CAPSULE ORAL NIGHTLY
Status: DISCONTINUED | OUTPATIENT
Start: 2020-01-01 | End: 2021-01-01 | Stop reason: HOSPADM

## 2020-01-01 RX ORDER — SODIUM CHLORIDE 9 MG/ML
INJECTION, SOLUTION INTRAVENOUS CONTINUOUS
Status: ACTIVE | OUTPATIENT
Start: 2020-01-01 | End: 2020-01-01

## 2020-01-01 RX ORDER — FINASTERIDE 5 MG/1
5 TABLET, FILM COATED ORAL DAILY
Status: DISCONTINUED | OUTPATIENT
Start: 2020-01-01 | End: 2020-01-01 | Stop reason: HOSPADM

## 2020-01-01 RX ORDER — DIPHENHYDRAMINE HCL 25 MG
25 TABLET ORAL NIGHTLY PRN
Status: DISCONTINUED | OUTPATIENT
Start: 2020-01-01 | End: 2020-01-01 | Stop reason: HOSPADM

## 2020-01-01 RX ORDER — ONDANSETRON 2 MG/ML
4 INJECTION INTRAMUSCULAR; INTRAVENOUS ONCE
Status: COMPLETED | OUTPATIENT
Start: 2020-01-01 | End: 2020-01-01

## 2020-01-01 RX ORDER — FUROSEMIDE 10 MG/ML
40 INJECTION INTRAMUSCULAR; INTRAVENOUS ONCE
Status: COMPLETED | OUTPATIENT
Start: 2020-01-01 | End: 2020-01-01

## 2020-01-01 RX ORDER — SODIUM CHLORIDE 0.9 % (FLUSH) 0.9 %
10 SYRINGE (ML) INJECTION PRN
Status: DISCONTINUED | OUTPATIENT
Start: 2020-01-01 | End: 2020-01-01 | Stop reason: HOSPADM

## 2020-01-01 RX ORDER — DIGOXIN 125 MCG
125 TABLET ORAL DAILY
Status: DISCONTINUED | OUTPATIENT
Start: 2020-01-01 | End: 2020-01-01 | Stop reason: HOSPADM

## 2020-01-01 RX ORDER — FUROSEMIDE 10 MG/ML
20 INJECTION INTRAMUSCULAR; INTRAVENOUS ONCE
Status: COMPLETED | OUTPATIENT
Start: 2020-01-01 | End: 2020-01-01

## 2020-01-01 RX ORDER — FUROSEMIDE 20 MG/1
20 TABLET ORAL ONCE
Status: COMPLETED | OUTPATIENT
Start: 2020-01-01 | End: 2020-01-01

## 2020-01-01 RX ORDER — POTASSIUM CHLORIDE 20 MEQ/1
40 TABLET, EXTENDED RELEASE ORAL PRN
Status: DISCONTINUED | OUTPATIENT
Start: 2020-01-01 | End: 2021-01-01 | Stop reason: HOSPADM

## 2020-01-01 RX ORDER — ONDANSETRON 2 MG/ML
4 INJECTION INTRAMUSCULAR; INTRAVENOUS EVERY 6 HOURS PRN
Status: DISCONTINUED | OUTPATIENT
Start: 2020-01-01 | End: 2020-01-01 | Stop reason: HOSPADM

## 2020-01-01 RX ORDER — MULTIVITAMIN WITH IRON
1 TABLET ORAL DAILY
Status: DISCONTINUED | OUTPATIENT
Start: 2020-01-01 | End: 2021-01-01 | Stop reason: HOSPADM

## 2020-01-01 RX ORDER — METHYLPREDNISOLONE SODIUM SUCCINATE 125 MG/2ML
125 INJECTION, POWDER, LYOPHILIZED, FOR SOLUTION INTRAMUSCULAR; INTRAVENOUS ONCE
Status: COMPLETED | OUTPATIENT
Start: 2020-01-01 | End: 2020-01-01

## 2020-01-01 RX ORDER — TAMSULOSIN HYDROCHLORIDE 0.4 MG/1
0.8 CAPSULE ORAL NIGHTLY
COMMUNITY

## 2020-01-01 RX ORDER — HALOPERIDOL 5 MG/ML
2 INJECTION INTRAMUSCULAR ONCE
Status: DISCONTINUED | OUTPATIENT
Start: 2020-01-01 | End: 2020-01-01 | Stop reason: HOSPADM

## 2020-01-01 RX ORDER — LEVOTHYROXINE SODIUM 0.1 MG/1
75 TABLET ORAL DAILY
COMMUNITY

## 2020-01-01 RX ORDER — FINASTERIDE 5 MG/1
5 TABLET, FILM COATED ORAL DAILY
Status: DISCONTINUED | OUTPATIENT
Start: 2020-01-01 | End: 2021-01-01 | Stop reason: HOSPADM

## 2020-01-01 RX ORDER — POTASSIUM CHLORIDE 7.45 MG/ML
10 INJECTION INTRAVENOUS PRN
Status: DISCONTINUED | OUTPATIENT
Start: 2020-01-01 | End: 2021-01-01 | Stop reason: HOSPADM

## 2020-01-01 RX ADMIN — MIDODRINE HYDROCHLORIDE 5 MG: 5 TABLET ORAL at 07:48

## 2020-01-01 RX ADMIN — IPRATROPIUM BROMIDE AND ALBUTEROL SULFATE 1 AMPULE: .5; 3 SOLUTION RESPIRATORY (INHALATION) at 07:30

## 2020-01-01 RX ADMIN — MIDODRINE HYDROCHLORIDE 5 MG: 5 TABLET ORAL at 12:49

## 2020-01-01 RX ADMIN — ALPRAZOLAM 0.25 MG: 0.5 TABLET ORAL at 11:47

## 2020-01-01 RX ADMIN — IPRATROPIUM BROMIDE AND ALBUTEROL SULFATE 1 AMPULE: .5; 3 SOLUTION RESPIRATORY (INHALATION) at 15:27

## 2020-01-01 RX ADMIN — AMPICILLIN SODIUM AND SULBACTAM SODIUM 3 G: 2; 1 INJECTION, POWDER, FOR SOLUTION INTRAMUSCULAR; INTRAVENOUS at 22:54

## 2020-01-01 RX ADMIN — FINASTERIDE 5 MG: 5 TABLET, FILM COATED ORAL at 09:25

## 2020-01-01 RX ADMIN — METHYLPREDNISOLONE SODIUM SUCCINATE 40 MG: 40 INJECTION, POWDER, FOR SOLUTION INTRAMUSCULAR; INTRAVENOUS at 00:55

## 2020-01-01 RX ADMIN — Medication 10 ML: at 16:40

## 2020-01-01 RX ADMIN — IPRATROPIUM BROMIDE AND ALBUTEROL SULFATE 1 AMPULE: .5; 3 SOLUTION RESPIRATORY (INHALATION) at 19:33

## 2020-01-01 RX ADMIN — IPRATROPIUM BROMIDE AND ALBUTEROL SULFATE 1 AMPULE: .5; 3 SOLUTION RESPIRATORY (INHALATION) at 18:15

## 2020-01-01 RX ADMIN — CEFTRIAXONE SODIUM 2 G: 2 INJECTION, POWDER, FOR SOLUTION INTRAMUSCULAR; INTRAVENOUS at 17:31

## 2020-01-01 RX ADMIN — PIPERACILLIN AND TAZOBACTAM 3.38 G: 3; .375 INJECTION, POWDER, LYOPHILIZED, FOR SOLUTION INTRAVENOUS at 22:20

## 2020-01-01 RX ADMIN — SODIUM CHLORIDE, PRESERVATIVE FREE 10 ML: 5 INJECTION INTRAVENOUS at 09:25

## 2020-01-01 RX ADMIN — METHYLPREDNISOLONE SODIUM SUCCINATE 125 MG: 125 INJECTION, POWDER, FOR SOLUTION INTRAMUSCULAR; INTRAVENOUS at 14:23

## 2020-01-01 RX ADMIN — Medication 3 MG: at 02:13

## 2020-01-01 RX ADMIN — POTASSIUM CHLORIDE 10 MEQ: 7.46 INJECTION, SOLUTION INTRAVENOUS at 21:06

## 2020-01-01 RX ADMIN — CEFEPIME HYDROCHLORIDE 2 G: 2 INJECTION, POWDER, FOR SOLUTION INTRAVENOUS at 13:31

## 2020-01-01 RX ADMIN — DILTIAZEM HYDROCHLORIDE 120 MG: 120 CAPSULE, COATED, EXTENDED RELEASE ORAL at 09:31

## 2020-01-01 RX ADMIN — IPRATROPIUM BROMIDE AND ALBUTEROL SULFATE 1 AMPULE: .5; 3 SOLUTION RESPIRATORY (INHALATION) at 20:35

## 2020-01-01 RX ADMIN — TAMSULOSIN HYDROCHLORIDE 0.8 MG: 0.4 CAPSULE ORAL at 21:03

## 2020-01-01 RX ADMIN — DILTIAZEM HYDROCHLORIDE 120 MG: 120 CAPSULE, COATED, EXTENDED RELEASE ORAL at 08:03

## 2020-01-01 RX ADMIN — IPRATROPIUM BROMIDE AND ALBUTEROL SULFATE 1 AMPULE: .5; 3 SOLUTION RESPIRATORY (INHALATION) at 12:07

## 2020-01-01 RX ADMIN — Medication 10 ML: at 05:32

## 2020-01-01 RX ADMIN — ALCOHOL 1 TABLET: 70.47 GEL TOPICAL at 09:31

## 2020-01-01 RX ADMIN — METHYLPREDNISOLONE SODIUM SUCCINATE 40 MG: 40 INJECTION, POWDER, FOR SOLUTION INTRAMUSCULAR; INTRAVENOUS at 17:40

## 2020-01-01 RX ADMIN — IPRATROPIUM BROMIDE AND ALBUTEROL SULFATE 1 AMPULE: .5; 3 SOLUTION RESPIRATORY (INHALATION) at 15:40

## 2020-01-01 RX ADMIN — IPRATROPIUM BROMIDE AND ALBUTEROL SULFATE 1 AMPULE: .5; 3 SOLUTION RESPIRATORY (INHALATION) at 12:43

## 2020-01-01 RX ADMIN — PIPERACILLIN AND TAZOBACTAM 3.38 G: 3; .375 INJECTION, POWDER, LYOPHILIZED, FOR SOLUTION INTRAVENOUS at 15:00

## 2020-01-01 RX ADMIN — AZITHROMYCIN MONOHYDRATE 500 MG: 500 INJECTION, POWDER, LYOPHILIZED, FOR SOLUTION INTRAVENOUS at 21:09

## 2020-01-01 RX ADMIN — CEFEPIME HYDROCHLORIDE 2 G: 2 INJECTION, POWDER, FOR SOLUTION INTRAVENOUS at 13:21

## 2020-01-01 RX ADMIN — TAMSULOSIN HYDROCHLORIDE 0.8 MG: 0.4 CAPSULE ORAL at 21:58

## 2020-01-01 RX ADMIN — PIPERACILLIN AND TAZOBACTAM 3.38 G: 3; .375 INJECTION, POWDER, LYOPHILIZED, FOR SOLUTION INTRAVENOUS at 13:49

## 2020-01-01 RX ADMIN — TAMSULOSIN HYDROCHLORIDE 0.8 MG: 0.4 CAPSULE ORAL at 22:12

## 2020-01-01 RX ADMIN — FINASTERIDE 5 MG: 5 TABLET, FILM COATED ORAL at 08:11

## 2020-01-01 RX ADMIN — WARFARIN SODIUM 3 MG: 3 TABLET ORAL at 16:39

## 2020-01-01 RX ADMIN — DOXYCYCLINE HYCLATE 100 MG: 100 TABLET, COATED ORAL at 22:33

## 2020-01-01 RX ADMIN — SODIUM CHLORIDE 500 ML: 9 INJECTION, SOLUTION INTRAVENOUS at 20:33

## 2020-01-01 RX ADMIN — DILTIAZEM HYDROCHLORIDE 120 MG: 120 CAPSULE, COATED, EXTENDED RELEASE ORAL at 09:25

## 2020-01-01 RX ADMIN — MIDODRINE HYDROCHLORIDE 5 MG: 5 TABLET ORAL at 11:58

## 2020-01-01 RX ADMIN — TAMSULOSIN HYDROCHLORIDE 0.8 MG: 0.4 CAPSULE ORAL at 22:04

## 2020-01-01 RX ADMIN — SODIUM CHLORIDE 250 ML: 0.9 INJECTION, SOLUTION INTRAVENOUS at 23:50

## 2020-01-01 RX ADMIN — WARFARIN SODIUM 2 MG: 2 TABLET ORAL at 16:46

## 2020-01-01 RX ADMIN — WARFARIN SODIUM 3 MG: 3 TABLET ORAL at 17:06

## 2020-01-01 RX ADMIN — IPRATROPIUM BROMIDE AND ALBUTEROL SULFATE 1 AMPULE: .5; 3 SOLUTION RESPIRATORY (INHALATION) at 11:18

## 2020-01-01 RX ADMIN — PIPERACILLIN AND TAZOBACTAM 3.38 G: 3; .375 INJECTION, POWDER, LYOPHILIZED, FOR SOLUTION INTRAVENOUS at 14:22

## 2020-01-01 RX ADMIN — SODIUM CHLORIDE, PRESERVATIVE FREE 10 ML: 5 INJECTION INTRAVENOUS at 22:39

## 2020-01-01 RX ADMIN — ENOXAPARIN SODIUM 80 MG: 80 INJECTION SUBCUTANEOUS at 22:04

## 2020-01-01 RX ADMIN — LEVOTHYROXINE SODIUM 75 MCG: 75 TABLET ORAL at 08:11

## 2020-01-01 RX ADMIN — DILTIAZEM HYDROCHLORIDE 120 MG: 120 CAPSULE, COATED, EXTENDED RELEASE ORAL at 08:41

## 2020-01-01 RX ADMIN — ENOXAPARIN SODIUM 80 MG: 80 INJECTION SUBCUTANEOUS at 11:19

## 2020-01-01 RX ADMIN — MIDODRINE HYDROCHLORIDE 5 MG: 5 TABLET ORAL at 11:25

## 2020-01-01 RX ADMIN — DIGOXIN 125 MCG: 125 TABLET ORAL at 08:33

## 2020-01-01 RX ADMIN — IPRATROPIUM BROMIDE AND ALBUTEROL SULFATE 1 AMPULE: .5; 3 SOLUTION RESPIRATORY (INHALATION) at 11:32

## 2020-01-01 RX ADMIN — SODIUM CHLORIDE, POTASSIUM CHLORIDE, SODIUM LACTATE AND CALCIUM CHLORIDE 500 ML: 600; 310; 30; 20 INJECTION, SOLUTION INTRAVENOUS at 06:43

## 2020-01-01 RX ADMIN — SODIUM CHLORIDE, PRESERVATIVE FREE 10 ML: 5 INJECTION INTRAVENOUS at 08:03

## 2020-01-01 RX ADMIN — POTASSIUM CHLORIDE 10 MEQ: 7.46 INJECTION, SOLUTION INTRAVENOUS at 17:09

## 2020-01-01 RX ADMIN — DIGOXIN 125 MCG: 125 TABLET ORAL at 09:31

## 2020-01-01 RX ADMIN — FINASTERIDE 5 MG: 5 TABLET, FILM COATED ORAL at 08:33

## 2020-01-01 RX ADMIN — PIPERACILLIN AND TAZOBACTAM 3.38 G: 3; .375 INJECTION, POWDER, LYOPHILIZED, FOR SOLUTION INTRAVENOUS at 06:49

## 2020-01-01 RX ADMIN — IPRATROPIUM BROMIDE AND ALBUTEROL SULFATE 1 AMPULE: .5; 3 SOLUTION RESPIRATORY (INHALATION) at 08:34

## 2020-01-01 RX ADMIN — ENOXAPARIN SODIUM 80 MG: 80 INJECTION SUBCUTANEOUS at 08:11

## 2020-01-01 RX ADMIN — AMPICILLIN SODIUM AND SULBACTAM SODIUM 3 G: 2; 1 INJECTION, POWDER, FOR SOLUTION INTRAMUSCULAR; INTRAVENOUS at 03:37

## 2020-01-01 RX ADMIN — ENOXAPARIN SODIUM 40 MG: 40 INJECTION SUBCUTANEOUS at 12:43

## 2020-01-01 RX ADMIN — TAMSULOSIN HYDROCHLORIDE 0.8 MG: 0.4 CAPSULE ORAL at 20:44

## 2020-01-01 RX ADMIN — DILTIAZEM HYDROCHLORIDE 120 MG: 120 CAPSULE, COATED, EXTENDED RELEASE ORAL at 07:54

## 2020-01-01 RX ADMIN — Medication 10 ML: at 01:08

## 2020-01-01 RX ADMIN — SODIUM CHLORIDE, PRESERVATIVE FREE 10 ML: 5 INJECTION INTRAVENOUS at 20:00

## 2020-01-01 RX ADMIN — CEFEPIME HYDROCHLORIDE 2 G: 2 INJECTION, POWDER, FOR SOLUTION INTRAVENOUS at 14:49

## 2020-01-01 RX ADMIN — ALCOHOL 1 TABLET: 70.47 GEL TOPICAL at 08:11

## 2020-01-01 RX ADMIN — IOPAMIDOL 75 ML: 755 INJECTION, SOLUTION INTRAVENOUS at 13:38

## 2020-01-01 RX ADMIN — DOXYCYCLINE HYCLATE 100 MG: 100 TABLET, COATED ORAL at 08:03

## 2020-01-01 RX ADMIN — PIPERACILLIN AND TAZOBACTAM 3.38 G: 3; .375 INJECTION, POWDER, LYOPHILIZED, FOR SOLUTION INTRAVENOUS at 22:12

## 2020-01-01 RX ADMIN — CIPROFLOXACIN 400 MG: 2 INJECTION, SOLUTION INTRAVENOUS at 12:37

## 2020-01-01 RX ADMIN — DILTIAZEM HYDROCHLORIDE 120 MG: 120 CAPSULE, COATED, EXTENDED RELEASE ORAL at 08:44

## 2020-01-01 RX ADMIN — FINASTERIDE 5 MG: 5 TABLET, FILM COATED ORAL at 07:48

## 2020-01-01 RX ADMIN — LEVOTHYROXINE SODIUM 75 MCG: 75 TABLET ORAL at 08:44

## 2020-01-01 RX ADMIN — PIPERACILLIN AND TAZOBACTAM 3.38 G: 3; .375 INJECTION, POWDER, LYOPHILIZED, FOR SOLUTION INTRAVENOUS at 12:43

## 2020-01-01 RX ADMIN — POTASSIUM CHLORIDE 10 MEQ: 7.46 INJECTION, SOLUTION INTRAVENOUS at 22:48

## 2020-01-01 RX ADMIN — FUROSEMIDE 20 MG: 20 TABLET ORAL at 12:43

## 2020-01-01 RX ADMIN — IPRATROPIUM BROMIDE AND ALBUTEROL SULFATE 1 AMPULE: .5; 3 SOLUTION RESPIRATORY (INHALATION) at 20:39

## 2020-01-01 RX ADMIN — IPRATROPIUM BROMIDE AND ALBUTEROL SULFATE 1 AMPULE: .5; 3 SOLUTION RESPIRATORY (INHALATION) at 08:22

## 2020-01-01 RX ADMIN — IPRATROPIUM BROMIDE AND ALBUTEROL SULFATE 1 AMPULE: .5; 3 SOLUTION RESPIRATORY (INHALATION) at 11:49

## 2020-01-01 RX ADMIN — SODIUM CHLORIDE, PRESERVATIVE FREE 10 ML: 5 INJECTION INTRAVENOUS at 20:48

## 2020-01-01 RX ADMIN — IPRATROPIUM BROMIDE AND ALBUTEROL SULFATE 1 AMPULE: .5; 3 SOLUTION RESPIRATORY (INHALATION) at 20:00

## 2020-01-01 RX ADMIN — FINASTERIDE 5 MG: 5 TABLET, FILM COATED ORAL at 07:53

## 2020-01-01 RX ADMIN — IPRATROPIUM BROMIDE AND ALBUTEROL SULFATE 1 AMPULE: .5; 3 SOLUTION RESPIRATORY (INHALATION) at 15:56

## 2020-01-01 RX ADMIN — Medication 5000 UNITS: at 07:53

## 2020-01-01 RX ADMIN — SODIUM CHLORIDE, PRESERVATIVE FREE 10 ML: 5 INJECTION INTRAVENOUS at 09:39

## 2020-01-01 RX ADMIN — PIPERACILLIN AND TAZOBACTAM 3.38 G: 3; .375 INJECTION, POWDER, LYOPHILIZED, FOR SOLUTION INTRAVENOUS at 21:58

## 2020-01-01 RX ADMIN — SODIUM CHLORIDE 500 ML: 0.9 INJECTION, SOLUTION INTRAVENOUS at 20:12

## 2020-01-01 RX ADMIN — TAMSULOSIN HYDROCHLORIDE 0.8 MG: 0.4 CAPSULE ORAL at 19:54

## 2020-01-01 RX ADMIN — FINASTERIDE 5 MG: 5 TABLET, FILM COATED ORAL at 08:44

## 2020-01-01 RX ADMIN — SODIUM CHLORIDE, PRESERVATIVE FREE 10 ML: 5 INJECTION INTRAVENOUS at 20:44

## 2020-01-01 RX ADMIN — VANCOMYCIN HYDROCHLORIDE 1250 MG: 10 INJECTION, POWDER, LYOPHILIZED, FOR SOLUTION INTRAVENOUS at 10:21

## 2020-01-01 RX ADMIN — SODIUM CHLORIDE, PRESERVATIVE FREE 10 ML: 5 INJECTION INTRAVENOUS at 12:20

## 2020-01-01 RX ADMIN — IPRATROPIUM BROMIDE AND ALBUTEROL SULFATE 1 AMPULE: .5; 3 SOLUTION RESPIRATORY (INHALATION) at 19:40

## 2020-01-01 RX ADMIN — DIGOXIN 125 MCG: 125 TABLET ORAL at 08:11

## 2020-01-01 RX ADMIN — MORPHINE SULFATE 4 MG: 4 INJECTION INTRAVENOUS at 12:31

## 2020-01-01 RX ADMIN — AMPICILLIN SODIUM AND SULBACTAM SODIUM 3 G: 2; 1 INJECTION, POWDER, FOR SOLUTION INTRAMUSCULAR; INTRAVENOUS at 16:21

## 2020-01-01 RX ADMIN — HALOPERIDOL LACTATE 2 MG: 5 INJECTION, SOLUTION INTRAMUSCULAR at 13:49

## 2020-01-01 RX ADMIN — ALCOHOL 1 TABLET: 70.47 GEL TOPICAL at 09:25

## 2020-01-01 RX ADMIN — CEFEPIME HYDROCHLORIDE 2 G: 2 INJECTION, POWDER, FOR SOLUTION INTRAVENOUS at 02:13

## 2020-01-01 RX ADMIN — LEVOTHYROXINE SODIUM 75 MCG: 75 TABLET ORAL at 08:03

## 2020-01-01 RX ADMIN — Medication 10 ML: at 06:10

## 2020-01-01 RX ADMIN — FINASTERIDE 5 MG: 5 TABLET, FILM COATED ORAL at 08:03

## 2020-01-01 RX ADMIN — SODIUM CHLORIDE: 9 INJECTION, SOLUTION INTRAVENOUS at 22:40

## 2020-01-01 RX ADMIN — IPRATROPIUM BROMIDE AND ALBUTEROL SULFATE 1 AMPULE: .5; 3 SOLUTION RESPIRATORY (INHALATION) at 08:12

## 2020-01-01 RX ADMIN — TAMSULOSIN HYDROCHLORIDE 0.8 MG: 0.4 CAPSULE ORAL at 20:47

## 2020-01-01 RX ADMIN — POTASSIUM CHLORIDE 40 MEQ: 1500 TABLET, EXTENDED RELEASE ORAL at 09:36

## 2020-01-01 RX ADMIN — LEVOTHYROXINE SODIUM 75 MCG: 75 TABLET ORAL at 07:55

## 2020-01-01 RX ADMIN — TAMSULOSIN HYDROCHLORIDE 0.8 MG: 0.4 CAPSULE ORAL at 22:43

## 2020-01-01 RX ADMIN — POTASSIUM CHLORIDE 40 MEQ: 1500 TABLET, EXTENDED RELEASE ORAL at 07:53

## 2020-01-01 RX ADMIN — Medication 5000 UNITS: at 14:23

## 2020-01-01 RX ADMIN — MIDODRINE HYDROCHLORIDE 5 MG: 5 TABLET ORAL at 17:06

## 2020-01-01 RX ADMIN — AMPICILLIN SODIUM AND SULBACTAM SODIUM 3 G: 2; 1 INJECTION, POWDER, FOR SOLUTION INTRAMUSCULAR; INTRAVENOUS at 15:51

## 2020-01-01 RX ADMIN — ENOXAPARIN SODIUM 80 MG: 80 INJECTION SUBCUTANEOUS at 20:44

## 2020-01-01 RX ADMIN — DOXYCYCLINE HYCLATE 100 MG: 100 TABLET, COATED ORAL at 21:58

## 2020-01-01 RX ADMIN — ENOXAPARIN SODIUM 80 MG: 80 INJECTION SUBCUTANEOUS at 08:32

## 2020-01-01 RX ADMIN — IPRATROPIUM BROMIDE AND ALBUTEROL SULFATE 1 AMPULE: .5; 3 SOLUTION RESPIRATORY (INHALATION) at 19:52

## 2020-01-01 RX ADMIN — AMPICILLIN SODIUM AND SULBACTAM SODIUM 3 G: 2; 1 INJECTION, POWDER, FOR SOLUTION INTRAMUSCULAR; INTRAVENOUS at 14:36

## 2020-01-01 RX ADMIN — IPRATROPIUM BROMIDE AND ALBUTEROL SULFATE 1 AMPULE: .5; 3 SOLUTION RESPIRATORY (INHALATION) at 16:02

## 2020-01-01 RX ADMIN — LEVOTHYROXINE SODIUM 75 MCG: 75 TABLET ORAL at 09:31

## 2020-01-01 RX ADMIN — MIDODRINE HYDROCHLORIDE 5 MG: 5 TABLET ORAL at 17:07

## 2020-01-01 RX ADMIN — PREDNISONE 20 MG: 20 TABLET ORAL at 07:55

## 2020-01-01 RX ADMIN — MIDODRINE HYDROCHLORIDE 5 MG: 5 TABLET ORAL at 16:21

## 2020-01-01 RX ADMIN — PREDNISONE 20 MG: 20 TABLET ORAL at 18:01

## 2020-01-01 RX ADMIN — POTASSIUM CHLORIDE 10 MEQ: 7.46 INJECTION, SOLUTION INTRAVENOUS at 20:03

## 2020-01-01 RX ADMIN — MIDODRINE HYDROCHLORIDE 5 MG: 5 TABLET ORAL at 03:07

## 2020-01-01 RX ADMIN — FUROSEMIDE 40 MG: 10 INJECTION, SOLUTION INTRAMUSCULAR; INTRAVENOUS at 11:34

## 2020-01-01 RX ADMIN — WARFARIN SODIUM 5 MG: 5 TABLET ORAL at 21:50

## 2020-01-01 RX ADMIN — LEVOTHYROXINE SODIUM 75 MCG: 75 TABLET ORAL at 07:54

## 2020-01-01 RX ADMIN — WARFARIN SODIUM 5 MG: 5 TABLET ORAL at 17:02

## 2020-01-01 RX ADMIN — POTASSIUM CHLORIDE 10 MEQ: 7.46 INJECTION, SOLUTION INTRAVENOUS at 18:55

## 2020-01-01 RX ADMIN — MIDODRINE HYDROCHLORIDE 5 MG: 5 TABLET ORAL at 09:25

## 2020-01-01 RX ADMIN — WARFARIN SODIUM 5 MG: 5 TABLET ORAL at 18:01

## 2020-01-01 RX ADMIN — Medication 10 ML: at 11:25

## 2020-01-01 RX ADMIN — ONDANSETRON 4 MG: 2 INJECTION INTRAMUSCULAR; INTRAVENOUS at 12:31

## 2020-01-01 RX ADMIN — IPRATROPIUM BROMIDE AND ALBUTEROL SULFATE 1 AMPULE: .5; 3 SOLUTION RESPIRATORY (INHALATION) at 08:42

## 2020-01-01 RX ADMIN — AMPICILLIN SODIUM AND SULBACTAM SODIUM 3 G: 2; 1 INJECTION, POWDER, FOR SOLUTION INTRAMUSCULAR; INTRAVENOUS at 21:39

## 2020-01-01 RX ADMIN — SODIUM CHLORIDE 80 ML: 9 INJECTION, SOLUTION INTRAVENOUS at 13:38

## 2020-01-01 RX ADMIN — DOXYCYCLINE HYCLATE 100 MG: 100 TABLET, COATED ORAL at 07:55

## 2020-01-01 RX ADMIN — CEFEPIME HYDROCHLORIDE 2 G: 2 INJECTION, POWDER, FOR SOLUTION INTRAVENOUS at 02:22

## 2020-01-01 RX ADMIN — SODIUM CHLORIDE, PRESERVATIVE FREE 10 ML: 5 INJECTION INTRAVENOUS at 22:37

## 2020-01-01 RX ADMIN — PIPERACILLIN AND TAZOBACTAM 3.38 G: 3; .375 INJECTION, POWDER, LYOPHILIZED, FOR SOLUTION INTRAVENOUS at 05:00

## 2020-01-01 RX ADMIN — SODIUM CHLORIDE, PRESERVATIVE FREE 10 ML: 5 INJECTION INTRAVENOUS at 21:00

## 2020-01-01 RX ADMIN — SODIUM CHLORIDE, PRESERVATIVE FREE 10 ML: 5 INJECTION INTRAVENOUS at 08:44

## 2020-01-01 RX ADMIN — WARFARIN SODIUM 3.5 MG: 5 TABLET ORAL at 17:40

## 2020-01-01 RX ADMIN — IPRATROPIUM BROMIDE AND ALBUTEROL SULFATE 1 AMPULE: .5; 3 SOLUTION RESPIRATORY (INHALATION) at 13:30

## 2020-01-01 RX ADMIN — IPRATROPIUM BROMIDE AND ALBUTEROL SULFATE 1 AMPULE: .5; 3 SOLUTION RESPIRATORY (INHALATION) at 07:12

## 2020-01-01 RX ADMIN — Medication 10 ML: at 16:21

## 2020-01-01 RX ADMIN — LEVOTHYROXINE SODIUM 75 MCG: 75 TABLET ORAL at 08:32

## 2020-01-01 RX ADMIN — AMPICILLIN SODIUM AND SULBACTAM SODIUM 3 G: 2; 1 INJECTION, POWDER, FOR SOLUTION INTRAMUSCULAR; INTRAVENOUS at 08:37

## 2020-01-01 RX ADMIN — METHYLPREDNISOLONE SODIUM SUCCINATE 40 MG: 40 INJECTION, POWDER, FOR SOLUTION INTRAMUSCULAR; INTRAVENOUS at 11:43

## 2020-01-01 RX ADMIN — ALCOHOL 1 TABLET: 70.47 GEL TOPICAL at 08:33

## 2020-01-01 RX ADMIN — Medication 10 ML: at 23:34

## 2020-01-01 RX ADMIN — AMPICILLIN SODIUM AND SULBACTAM SODIUM 3 G: 2; 1 INJECTION, POWDER, FOR SOLUTION INTRAMUSCULAR; INTRAVENOUS at 21:41

## 2020-01-01 RX ADMIN — ENOXAPARIN SODIUM 80 MG: 80 INJECTION SUBCUTANEOUS at 21:39

## 2020-01-01 RX ADMIN — IPRATROPIUM BROMIDE AND ALBUTEROL SULFATE 1 AMPULE: .5; 3 SOLUTION RESPIRATORY (INHALATION) at 11:28

## 2020-01-01 RX ADMIN — CIPROFLOXACIN 400 MG: 2 INJECTION, SOLUTION INTRAVENOUS at 00:37

## 2020-01-01 RX ADMIN — WARFARIN SODIUM 5 MG: 5 TABLET ORAL at 16:23

## 2020-01-01 RX ADMIN — DIGOXIN 125 MCG: 125 TABLET ORAL at 09:25

## 2020-01-01 RX ADMIN — DIGOXIN 125 MCG: 125 TABLET ORAL at 07:48

## 2020-01-01 RX ADMIN — PIPERACILLIN AND TAZOBACTAM 3.38 G: 3; .375 INJECTION, POWDER, LYOPHILIZED, FOR SOLUTION INTRAVENOUS at 21:05

## 2020-01-01 RX ADMIN — PIPERACILLIN AND TAZOBACTAM 3.38 G: 3; .375 INJECTION, POWDER, LYOPHILIZED, FOR SOLUTION INTRAVENOUS at 06:40

## 2020-01-01 RX ADMIN — METHYLPREDNISOLONE SODIUM SUCCINATE 40 MG: 40 INJECTION, POWDER, FOR SOLUTION INTRAMUSCULAR; INTRAVENOUS at 06:49

## 2020-01-01 RX ADMIN — IPRATROPIUM BROMIDE AND ALBUTEROL SULFATE 1 AMPULE: .5; 3 SOLUTION RESPIRATORY (INHALATION) at 15:42

## 2020-01-01 RX ADMIN — WARFARIN SODIUM 3 MG: 3 TABLET ORAL at 17:07

## 2020-01-01 RX ADMIN — IPRATROPIUM BROMIDE AND ALBUTEROL SULFATE 1 AMPULE: .5; 3 SOLUTION RESPIRATORY (INHALATION) at 20:43

## 2020-01-01 RX ADMIN — FINASTERIDE 5 MG: 5 TABLET, FILM COATED ORAL at 07:55

## 2020-01-01 RX ADMIN — Medication 10 ML: at 17:03

## 2020-01-01 RX ADMIN — PREDNISONE 20 MG: 20 TABLET ORAL at 08:03

## 2020-01-01 RX ADMIN — DIGOXIN 125 MCG: 125 TABLET ORAL at 08:03

## 2020-01-01 RX ADMIN — DIGOXIN 125 MCG: 125 TABLET ORAL at 08:44

## 2020-01-01 RX ADMIN — PHYTONADIONE 10 MG: 10 INJECTION, EMULSION INTRAMUSCULAR; INTRAVENOUS; SUBCUTANEOUS at 18:47

## 2020-01-01 RX ADMIN — PHYTONADIONE 10 MG: 10 INJECTION, EMULSION INTRAMUSCULAR; INTRAVENOUS; SUBCUTANEOUS at 12:43

## 2020-01-01 RX ADMIN — IPRATROPIUM BROMIDE AND ALBUTEROL SULFATE 1 AMPULE: .5; 3 SOLUTION RESPIRATORY (INHALATION) at 12:05

## 2020-01-01 RX ADMIN — DILTIAZEM HYDROCHLORIDE 120 MG: 120 CAPSULE, COATED, EXTENDED RELEASE ORAL at 07:48

## 2020-01-01 RX ADMIN — Medication 5000 UNITS: at 08:03

## 2020-01-01 RX ADMIN — SODIUM CHLORIDE, PRESERVATIVE FREE 10 ML: 5 INJECTION INTRAVENOUS at 21:03

## 2020-01-01 RX ADMIN — DIGOXIN 125 MCG: 125 TABLET ORAL at 07:54

## 2020-01-01 RX ADMIN — TAMSULOSIN HYDROCHLORIDE 0.8 MG: 0.4 CAPSULE ORAL at 22:39

## 2020-01-01 RX ADMIN — Medication 10 ML: at 17:07

## 2020-01-01 RX ADMIN — IPRATROPIUM BROMIDE AND ALBUTEROL SULFATE 1 AMPULE: .5; 3 SOLUTION RESPIRATORY (INHALATION) at 20:09

## 2020-01-01 RX ADMIN — IPRATROPIUM BROMIDE AND ALBUTEROL SULFATE 1 AMPULE: .5; 3 SOLUTION RESPIRATORY (INHALATION) at 08:53

## 2020-01-01 RX ADMIN — IPRATROPIUM BROMIDE AND ALBUTEROL SULFATE 1 AMPULE: .5; 3 SOLUTION RESPIRATORY (INHALATION) at 08:54

## 2020-01-01 RX ADMIN — MIDODRINE HYDROCHLORIDE 5 MG: 5 TABLET ORAL at 17:02

## 2020-01-01 RX ADMIN — FINASTERIDE 5 MG: 5 TABLET, FILM COATED ORAL at 09:31

## 2020-01-01 RX ADMIN — TAMSULOSIN HYDROCHLORIDE 0.8 MG: 0.4 CAPSULE ORAL at 22:34

## 2020-01-01 RX ADMIN — AMPICILLIN SODIUM AND SULBACTAM SODIUM 3 G: 2; 1 INJECTION, POWDER, FOR SOLUTION INTRAMUSCULAR; INTRAVENOUS at 03:47

## 2020-01-01 RX ADMIN — MIDODRINE HYDROCHLORIDE 5 MG: 5 TABLET ORAL at 08:33

## 2020-01-01 RX ADMIN — Medication 10 ML: at 13:38

## 2020-01-01 RX ADMIN — MIDODRINE HYDROCHLORIDE 5 MG: 5 TABLET ORAL at 08:11

## 2020-01-01 RX ADMIN — IPRATROPIUM BROMIDE AND ALBUTEROL SULFATE 1 AMPULE: .5; 3 SOLUTION RESPIRATORY (INHALATION) at 13:02

## 2020-01-01 RX ADMIN — PIPERACILLIN AND TAZOBACTAM 3.38 G: 3; .375 INJECTION, POWDER, LYOPHILIZED, FOR SOLUTION INTRAVENOUS at 13:01

## 2020-01-01 RX ADMIN — IPRATROPIUM BROMIDE AND ALBUTEROL SULFATE 1 AMPULE: .5; 3 SOLUTION RESPIRATORY (INHALATION) at 20:22

## 2020-01-01 RX ADMIN — LEVOTHYROXINE SODIUM 75 MCG: 75 TABLET ORAL at 09:25

## 2020-01-01 RX ADMIN — PIPERACILLIN AND TAZOBACTAM 3.38 G: 3; .375 INJECTION, POWDER, LYOPHILIZED, FOR SOLUTION INTRAVENOUS at 06:30

## 2020-01-01 RX ADMIN — DIGOXIN 125 MCG: 125 TABLET ORAL at 07:55

## 2020-01-01 RX ADMIN — ALCOHOL 1 TABLET: 70.47 GEL TOPICAL at 07:48

## 2020-01-01 RX ADMIN — MIDODRINE HYDROCHLORIDE 5 MG: 5 TABLET ORAL at 16:39

## 2020-01-01 RX ADMIN — DILTIAZEM HYDROCHLORIDE 120 MG: 120 CAPSULE, COATED, EXTENDED RELEASE ORAL at 08:11

## 2020-01-01 RX ADMIN — AMPICILLIN SODIUM AND SULBACTAM SODIUM 3 G: 2; 1 INJECTION, POWDER, FOR SOLUTION INTRAMUSCULAR; INTRAVENOUS at 08:11

## 2020-01-01 RX ADMIN — DILTIAZEM HYDROCHLORIDE 120 MG: 120 CAPSULE, COATED, EXTENDED RELEASE ORAL at 07:55

## 2020-01-01 RX ADMIN — FUROSEMIDE 20 MG: 10 INJECTION, SOLUTION INTRAMUSCULAR; INTRAVENOUS at 17:09

## 2020-01-01 RX ADMIN — LEVOTHYROXINE SODIUM 75 MCG: 75 TABLET ORAL at 07:48

## 2020-01-01 RX ADMIN — IPRATROPIUM BROMIDE AND ALBUTEROL SULFATE 1 AMPULE: .5; 3 SOLUTION RESPIRATORY (INHALATION) at 09:13

## 2020-01-01 RX ADMIN — POTASSIUM CHLORIDE 10 MEQ: 7.46 INJECTION, SOLUTION INTRAVENOUS at 17:57

## 2020-01-01 SDOH — HEALTH STABILITY: MENTAL HEALTH: HOW OFTEN DO YOU HAVE A DRINK CONTAINING ALCOHOL?: NEVER

## 2020-01-01 SDOH — HEALTH STABILITY: MENTAL HEALTH: HOW MANY STANDARD DRINKS CONTAINING ALCOHOL DO YOU HAVE ON A TYPICAL DAY?: NOT ASKED

## 2020-01-01 ASSESSMENT — ENCOUNTER SYMPTOMS
TACHYPNEA: 1
VOMITING: 0
SHORTNESS OF BREATH: 1
COUGH: 1
ABDOMINAL PAIN: 0
BLOOD IN STOOL: 0
STRIDOR: 0
CONSTIPATION: 1
NAUSEA: 0
DIARRHEA: 0
COLOR CHANGE: 1
WHEEZING: 0

## 2020-01-01 ASSESSMENT — PAIN SCALES - WONG BAKER

## 2020-01-01 ASSESSMENT — PAIN SCALES - GENERAL
PAINLEVEL_OUTOF10: 0
PAINLEVEL_OUTOF10: 6
PAINLEVEL_OUTOF10: 0

## 2020-01-01 ASSESSMENT — PAIN DESCRIPTION - PROGRESSION: CLINICAL_PROGRESSION: NOT CHANGED

## 2020-01-01 ASSESSMENT — PAIN DESCRIPTION - FREQUENCY: FREQUENCY: INTERMITTENT

## 2020-12-14 PROBLEM — R79.1 SUPRATHERAPEUTIC INR: Status: ACTIVE | Noted: 2020-01-01

## 2020-12-14 NOTE — ED PROVIDER NOTES
[address] [facility] ED  [address] hospitals [address]  Phone: [phone]  Fax: Carmen Morgan [phone]      Pt Name: Daniela Arzola  MRN: [id number]  [place] 1935  Date of evaluation: 12/14/2020    CHIEF COMPLAINT       Chief Complaint   Patient presents with    Abnormal Lab     possibly PTINR, nursing home told EMS the patient was bleeding out because of his labs, no complaints at this time    Abrasion     skin tears       HISTORY OF PRESENT ILLNESS    Daniela Arzola is a 82 y.o. male who presents with a reported history of abnormal labs per the nursing home. Patient is unable to give a good history. REVIEW OF SYSTEMS       PAST MEDICAL HISTORY    has a past medical history of Atherosclerosis, Benign prostatic hyperplasia, Hypertension, Hypothyroidism, Osteoporosis, Paroxysmal A-fib ([address] Pneumonia, Rhabdomyolysis, Spondylolisthesis, and Vitamin D deficiency. SURGICAL HISTORY      has no past surgical history on file. CURRENT MEDICATIONS       Previous Medications    ACETAMINOPHEN (TYLENOL) 325 MG TABLET    Take 650 mg by mouth every 4 hours as needed for Pain    CHOLECALCIFEROL (VITAMIN D3) 125 MCG (5000 UT) TABS    Take by mouth daily    COLLAGENASE 250 UNIT/GM OINTMENT    Apply 250 g topically daily    DIGOXIN (LANOXIN) 125 MCG TABLET    Take 125 mcg by mouth daily    DILTIAZEM (CARDIZEM LA) 120 MG TB24 EXTENDED RELEASE TABLET    Take 120 mg by mouth daily    ERGOCALCIFEROL (ERGOCALCIFEROL) 1.25 MG (06768 UT) CAPSULE    Take 50,000 Units by mouth once a week Saturday    FINASTERIDE (PROSCAR) 5 MG TABLET    Take 5 mg by mouth daily    LEVOTHYROXINE (SYNTHROID) 100 MCG TABLET    Take 75 mcg by mouth daily    OXYCODONE-ACETAMINOPHEN (PERCOCET) 5-325 MG PER TABLET    Take 1-2 tablets by mouth every 6 hours.     TAMSULOSIN (FLOMAX) 0.4 MG CAPSULE    Take 0.8 mg by mouth nightly    WARFARIN (COUMADIN) 3 MG TABLET    Take 3 mg by mouth daily       ALLERGIES     has No Known Allergies. FAMILY HISTORY     has no family status information on file. family history is not on file. SOCIAL HISTORY          PHYSICAL EXAM       ED Triage Vitals   BP Temp Temp src Pulse Resp SpO2 Height Weight   -- -- -- -- -- -- -- --     Constitutional: Alert, oriented x3, nontoxic, answering questions appropriately, acting properly for age, in no acute distress   HEENT: Extraocular muscles intact, mucus membranes moist, TMs clear bilaterally, no posterior pharyngeal erythema or exudates, Pupils equal, round, reactive to light,   Neck: Trachea midline   Cardiovascular: Regular rhythm and rate no S3, S4, or murmurs   Respiratory: Clear to auscultation bilaterally no wheezes, rhonchi, rales, no respiratory distress no tachypnea no retractions no hypoxia  Gastrointestinal: Soft, nontender, nondistended, positive bowel sounds. No rebound, rigidity, or guarding. Musculoskeletal: No extremity pain or swelling   Neurologic: Moving all 4 extremities without difficulty there are no gross focal neurologic deficits   Skin there is evidence of senile purpura and areas of bleeding noted on the lateral aspect of the left leg. DIFFERENTIAL DIAGNOSIS/ MDM:     Elevated INR? We attempted to get a hold of nursing home at 5:30 PM but no answer was obtained we are going on a presumed elevated INR  At 6:13 PM we were informed by the nursing home that his INR was approximately 9 we will give him a dose of vitamin K  Because of the elevation of his INR appears to be higher than what the original lab test we will admit him for IV vitamin K therapy here at the hospital keep him from having a possible fall and further issues.   DIAGNOSTIC RESULTS     EKG: All EKG's are interpreted by the Emergency Department Physician who either signs or Co-signs this chart in the absence of a cardiologist.        Not indicated unless otherwise documented above    LABS:  Results for orders placed or performed during the hospital encounter of 12/14/20   CBC Auto Differential   Result Value Ref Range    WBC 17.8 (H) 3.5 - 11.0 k/uL    RBC 3.58 (L) 4.5 - 5.9 m/uL    Hemoglobin 10.3 (L) 13.5 - 17.5 g/dL    Hematocrit 31.7 (L) 41 - 53 %    MCV 88.6 80 - 100 fL    MCH 28.8 26 - 34 pg    MCHC 32.6 31 - 37 g/dL    RDW 18.9 (H) 12.5 - 15.4 %    Platelets 864 (H) 523 - 450 k/uL    MPV 7.8 6.0 - 12.0 fL    NRBC Automated NOT REPORTED per 100 WBC    Differential Type NOT REPORTED     Immature Granulocytes NOT REPORTED 0 %    Absolute Immature Granulocyte NOT REPORTED 0.00 - 0.30 k/uL    WBC Morphology NOT REPORTED     RBC Morphology NOT REPORTED     Platelet Estimate NOT REPORTED     Seg Neutrophils 91 (H) 36 - 66 %    Lymphocytes 7 (L) 24 - 44 %    Monocytes 2 1 - 7 %    Eosinophils % 0 (L) 1 - 4 %    Basophils 0 0 - 2 %    Segs Absolute 16.19 (H) 1.8 - 7.7 k/uL    Absolute Lymph # 1.25 1.0 - 4.8 k/uL    Absolute Mono # 0.36 0.1 - 0.8 k/uL    Absolute Eos # 0.00 0.0 - 0.4 k/uL    Basophils Absolute 0.00 0.0 - 0.2 k/uL    Morphology Normal    Protime-INR   Result Value Ref Range    Protime >150.0 (H) 9.4 - 12.6 sec    INR >14.8 (HH)    APTT   Result Value Ref Range    PTT 84.4 (H) 21.3 - 31.3 sec   Comprehensive Metabolic Panel w/ Reflex to MG   Result Value Ref Range    Glucose 128 (H) 70 - 99 mg/dL    BUN 22 8 - 23 mg/dL    CREATININE 0.96 0.70 - 1.20 mg/dL    Bun/Cre Ratio NOT REPORTED 9 - 20    Calcium 9.7 8.6 - 10.4 mg/dL    Sodium 138 135 - 144 mmol/L    Potassium 3.5 (L) 3.7 - 5.3 mmol/L    Chloride 98 98 - 107 mmol/L    CO2 29 20 - 31 mmol/L    Anion Gap 11 9 - 17 mmol/L    Alkaline Phosphatase 106 40 - 129 U/L    ALT 12 5 - 41 U/L    AST 27 <40 U/L    Total Bilirubin 0.78 0.3 - 1.2 mg/dL    Total Protein 6.6 6.4 - 8.3 g/dL    Alb 3.1 (L) 3.5 - 5.2 g/dL    Albumin/Globulin Ratio 0.9 (L) 1.0 - 2.5    GFR Non-African American >60 >60 mL/min    GFR African American >60 >60 mL/min    GFR Comment          GFR Staging NOT REPORTED    Magnesium

## 2020-12-15 PROBLEM — M48.56XA PATHOLOGICAL COMPRESSION FRACTURE OF LUMBAR VERTEBRA (HCC): Status: ACTIVE | Noted: 2020-01-01

## 2020-12-15 PROBLEM — S82.024A CLOSED NONDISPLACED LONGITUDINAL FRACTURE OF RIGHT PATELLA: Status: ACTIVE | Noted: 2020-01-01

## 2020-12-15 PROBLEM — J96.11 CHRONIC RESPIRATORY FAILURE WITH HYPOXIA, ON HOME OXYGEN THERAPY (HCC): Status: ACTIVE | Noted: 2020-01-01

## 2020-12-15 PROBLEM — M80.00XA AGE-RELATED OSTEOPOROSIS WITH CURRENT PATHOLOGICAL FRACTURE: Chronic | Status: ACTIVE | Noted: 2020-01-01

## 2020-12-15 PROBLEM — J18.9 PNEUMONIA DUE TO INFECTIOUS ORGANISM: Status: ACTIVE | Noted: 2020-01-01

## 2020-12-15 PROBLEM — M80.00XA AGE-RELATED OSTEOPOROSIS WITH CURRENT PATHOLOGICAL FRACTURE: Status: ACTIVE | Noted: 2020-01-01

## 2020-12-15 PROBLEM — I70.209 ATHEROSCLEROTIC PERIPHERAL VASCULAR DISEASE (HCC): Status: ACTIVE | Noted: 2020-01-01

## 2020-12-15 PROBLEM — Z99.81 CHRONIC RESPIRATORY FAILURE WITH HYPOXIA, ON HOME OXYGEN THERAPY (HCC): Status: ACTIVE | Noted: 2020-01-01

## 2020-12-15 NOTE — PROGRESS NOTES
Occupational Therapy   Occupational Therapy Initial Assessment  Date: 12/15/2020   Patient Name: Daxa Etienne  MRN: [id number]     : 1935    Date of Service: 12/15/2020    Discharge Recommendations:  Patient would benefit from continued therapy after discharge       Assessment   Performance deficits / Impairments: Decreased functional mobility ; Decreased ADL status; Decreased safe awareness;Decreased balance;Decreased endurance;Decreased strength;Decreased coordination;Decreased fine motor control;Decreased cognition  Prognosis: Good;Fair  Decision Making: Medium Complexity  OT Education: OT Role;Plan of Care;Precautions; ADL Adaptive Strategies;Transfer Training;Energy Conservation;Equipment  REQUIRES OT FOLLOW UP: Yes  Activity Tolerance  Activity Tolerance: Patient limited by fatigue(and SOB/labored breathing)  Safety Devices  Safety Devices in place: Yes  Type of devices: Call light within reach; Chair alarm in place; Left in chair;Nurse notified  Restraints  Initially in place: No           Patient Diagnosis(es): The primary encounter diagnosis was Coagulopathy ([facility] [address] Diagnoses of Elevated INR and Leukocytosis, unspecified type were also pertinent to this visit. has a past medical history of Atherosclerosis, Benign prostatic hyperplasia, Hypertension, Hypothyroidism, Osteoporosis, Paroxysmal A-fib ([address] Pneumonia, Rhabdomyolysis, Smoking, Spondylolisthesis, and Vitamin D deficiency. has a past surgical history that includes Ankle surgery; hip surgery; and Tonsillectomy and adenoidectomy.            Restrictions  Restrictions/Precautions  Restrictions/Precautions: Weight Bearing, Fall Risk  Required Braces or Orthoses?: Yes  Lower Extremity Weight Bearing Restrictions  Right Lower Extremity Weight Bearing: Weight Bearing As Tolerated (Therapist called SNF from which pt was admitted and spoke to therapist Elena Echavarria regarding pt's most updated WB/ROM restrictions)  Required Braces or Orthoses  Right Lower Extremity Brace: Knee Immobilizer  Position Activity Restriction  Other position/activity restrictions: Up with assistance; 4L O2 in place via nasal cannula    Subjective   General  Patient assessed for rehabilitation services?: Yes  Family / Caregiver Present: No  General Comment  Comments: RN ok'd for therapy this AM. Pt agreeable to participate in session and pleasant/cooperative throughout.   Patient Currently in Pain: Denies    Oxygen Therapy  SpO2: 100 %  O2 Device: Nasal cannula  O2 Flow Rate (L/min): 4 L/min     Social/Functional History  Social/Functional History  Type of Home: Facility([facility]- [address] [place] [address])  Home Layout: One level  Home Access: Level entry  Bathroom Shower/Tub: Walk-in shower  Bathroom Toilet: Handicap height  Bathroom Equipment: Grab bars in shower, Shower chair, Grab bars around toilet  Bathroom Accessibility: Wheelchair accessible  Home Equipment: Rolling walker  Receives Help From: Other (Facility attendants perform IADLs and assist with ADLs; daily PT/OT services per pt report)  ADL Assistance: Independent (per pt report- therapist questions this based on pt's current functional ability)  Homemaking Assistance: Needs assistance  Homemaking Responsibilities: No (all performed by facility attendants)  Ambulation Assistance: Independent (per pt report however per therapist at [facility], pt performing functional mobility with min A and use of RW)  Transfer Assistance: Independent (per pt report- writer questions this based on pt's current functional ability)  Active : No  Occupation: Retired  Leisure & Hobbies: Watches tv  Comments: Above information mostly obtained per pt report, pt is a questionable historian based on current cognitive status and pt providing conflicting information to writer throughout session       Objective   Vision: Impaired  Vision Exceptions: Wears glasses for reading  Hearing: Exceptions to [facility]  Hearing Exceptions: Hard of hearing/hearing concerns Balance  Sitting Balance: Contact guard assistance(SBA-CGA while unsupported seated EOB)  Standing Balance: Moderate assistance  Standing Balance  Time: 5x total bouts of standing throughout session with ~1.5-2 minutes standing tolerance at each bout prior to requiring a prolonged seated rest break due to SOB/labored breathing and fatigue  Activity: static standing at EOB 4x during brief mngt/pericare/bottom hygiene, functional mobility from bed > chair, static standing at chair  Comment: min A-mod A x2 with use of RW required throughout; pt significantly unsteady and impulsive requiring mod verbal cues/tactile cues for sequencing/safety awareness. Pt with complaints of fatigue and SOB following minimal exertion and requests seated rest breaks; pt requires max verbal cues once seated to incorporate pursed lip breathing techniques. ADL  Feeding: Setup; Increased time to complete  Grooming: Increased time to complete;Minimal assistance(seated EOB to perform facial hygiene and hand hygiene)  UE Bathing: Increased time to complete; Moderate assistance  LE Bathing: Increased time to complete;Maximum assistance  UE Dressing: Increased time to complete;Minimal assistance(seated EOB to doff/don hospital gown)  LE Dressing: Increased time to complete; Moderate assistance(seated EOB to don slip-on tennis shoes)  Toileting: Increased time to complete;Maximum assistance(pt with soiled brief at therapist arrival and required max A for brief mngt/pericare/bottom hygiene while standing EOB with use of RW)     Tone RUE  RUE Tone: Normotonic  Tone LUE  LUE Tone: Normotonic  Coordination  Movements Are Fluid And Coordinated: No  Coordination and Movement description: Fine motor impairments;Gross motor impairments;Tremors;Decreased speed;Decreased accuracy; Right UE;Left UE        Bed mobility  Supine to Sit: Contact guard assistance  Sit to Supine: (pt retired up to chair)  Scooting: Contact guard assistance  Comment: HOB raised ~60* and use of hand rails; pt significanly impulsive throughout bed mobility tasks and required mod verbal cues for safety awareness     Transfers  Stand Step Transfers: Moderate assistance;2 Person assistance  Sit to stand: Moderate assistance;2 Person assistance(4x sit > stand transfers from EOB with mod A x2 and use of RW; 1x sit > stand transfer from chair height with min A x2 and use of RW)  Stand to sit: Moderate assistance;2 Person assistance  Transfer Comments: Pt signficantly impulsive and unsteady during all functional transfers this date with use of RW requiring mod verbal/tactile cues for proper hand placement/sequencing/safety awareness throughout. Cognition  Overall Cognitive Status: Exceptions  Arousal/Alertness: Delayed responses to stimuli  Following Commands: Follows one step commands with repetition; Follows one step commands with increased time  Attention Span: Attends with cues to redirect  Memory: Decreased recall of biographical Information  Safety Judgement: Decreased awareness of need for assistance;Decreased awareness of need for safety  Problem Solving: Assistance required to generate solutions;Assistance required to implement solutions;Decreased awareness of errors  Insights: Decreased awareness of deficits  Initiation: Requires cues for some  Sequencing: Requires cues for some      Sensation  Overall Sensation Status: Impaired(Pt reports numbness/tingling to throat)    LUE AROM (degrees)  LUE AROM : WFL  RUE AROM (degrees)  RUE AROM : WFL  LUE Strength  Gross LUE Strength: WFL  RUE Strength  Gross RUE Strength: WFL       Writer returned later this date, 1503 to 5, for additional ADL session; pt performed bed mobility tasks and functional transfers/functional mobility with use of RW and above level of assistance. Pt with improved tolerance to engage in functional mobility this date and pt demo ability to perform functional mobility to/from bathroom with mod A x2.  Pt requesting use of toilet and required max A for brief mngt/pericare/bottom hygiene with increased time/effort and use of grab bars. Pt with significant SOB/labored breathing and required 2x prolonged seated rest breaks throughout activity. Pt significantly unsteady with BLE buckling and significantly impulsive requiring max verbal cues for safety awareness. RT entered due to pt with noted O2 desaturation and increased O2. Pt returned to supine in bed with above level of assistance required and RN present.      Plan   Plan  Times per week: 5-6x/wk  Times per day: Daily  Current Treatment Recommendations: Strengthening, Balance Training, Functional Mobility Training, Endurance Training, Patient/Caregiver Education & Training, Equipment Evaluation, Education, & procurement, Self-Care / ADL, Home Management Training, Safety Education & Training      AM-PAC Score  AM-[facility] Inpatient Daily Activity Raw Score: 15 (12/15/20 1213)  AM-PAC Inpatient ADL T-Scale Score : 34.69 (12/15/20 1213)  ADL Inpatient CMS 0-100% Score: 56.46 (12/15/20 1213)  ADL Inpatient CMS G-Code Modifier : CK (12/15/20 1213)    Goals  Short term goals  Time Frame for Short term goals: 14 visits  Short term goal 1: Pt will perform functional transfers/functional mobility with CGA and use of RW  Short term goal 2: Pt will independently demo good safety awareness during engagement in ADLs and functional transfers/functional mobility  Short term goal 3: Pt will independently incorporate energy conservation techniques as needed during engagement in ADLs and functional transfers/functional mobility  Short term goal 4: Pt will demo 8+ minutes standing tolerance with use of RW for increased participation in ADLs  Short term goal 5: Pt will perform feeding/grooming/UB ADL tasks with mod IND including setup and use of AE/DME PRN  Short term goal 6: Pt will perform LB ADL tasks/toileting tasks with min A including setup and use of AE/DME PRN       Therapy Time   Individual Concurrent Group Co-treatment   Time In 1017         Time Out 1056 returned 1503 to 1517         Minutes 39 + 14 = 53 total          Time Coded Treatment Minutes: [address], OTR/L

## 2020-12-15 NOTE — CARE COORDINATION
Patient order for MBS on chart - testing not available here. To be scheduled at [facility] - call to SLP dept at [phone] left message requesting return call to scheduled time for procedure.  Will need ambulance transport, pt with O2 per NC.

## 2020-12-15 NOTE — PROGRESS NOTES
Pharmacy Note  Warfarin Consult    May Males is a 82 y.o. male for whom pharmacy has been consulted to manage warfarin therapy. Consulting Physician: Nallely Painter  Reason for Admission: supratherapeutic INR    Warfarin dose prior to admission: 3.5 mg daily? Warfarin indication: afib  Target INR range: 2-3     Past Medical History:   Diagnosis Date    Atherosclerosis     Benign prostatic hyperplasia     Hypertension     Hypothyroidism     Osteoporosis     Paroxysmal A-fib (HCC)     Pneumonia     Rhabdomyolysis     Smoking     Spondylolisthesis     Vitamin D deficiency         Recent Labs     12/15/20  0559   INR 1.7     Recent Labs     12/14/20  1725 12/15/20  0559   HGB 10.3* 10.3*   HCT 31.7* 32.0*   * 416       Current warfarin drug-drug interactions:  acetaminophen, levothyroxine      Date INR Dose   12/15/20 1.7 3.5 mg       Daily PT/INR while inpatient. PT/INR ordered to start today, 12/15/2020. Thank you for the consult. Will continue to follow. Scratch note completed on 12/15/20.     Eulogio Llanos PharmD 12/15/2020 8:47 AM

## 2020-12-15 NOTE — PROGRESS NOTES
Physical Therapy    Facility/Department: [facility] SURG ICU  Initial Assessment    NAME: Mihai Duron  : 1935  MRN: [id number]    Date of Service: 12/15/2020   Chief Complaint   Patient presents with    Abnormal Lab     possibly PTINR, nursing home told EMS the patient was bleeding out because of his labs, no complaints at this time    Abrasion     skin tears       Discharge Recommendations:  Patient would benefit from continued therapy after discharge   PT Equipment Recommendations  Equipment Needed: No(Pt reports owning a RW)    Assessment   Body structures, Functions, Activity limitations: Decreased functional mobility ; Decreased balance;Decreased strength;Decreased cognition;Decreased endurance;Decreased safe awareness  Assessment: Pt presents with decrease balance, functional mobility, endurance, and saftey awarenss. Pt has a prior RLE patella fracture requiring a knee immobilizer. Pt is impulsive and requires maximum verbal and tactile cues for initiation and saftey. Pt performs bed mobility with CGA, transfers sit <> stand require Min to Mod A x2 from EOB and CGA from the chair. Pt ambulated 2ft x2 with min to mod A x2 for balance. Pt plans to [facility] and would benefit from further therapy upon discharge. Prognosis: Good  Decision Making: Medium Complexity  PT Education: Goals;PT Role;Plan of Care;Gait Training;Functional Mobility Training;Transfer Training  Barriers to Learning: cognition  REQUIRES PT FOLLOW UP: Yes  Activity Tolerance  Activity Tolerance: Patient limited by endurance; Patient limited by fatigue       Patient Diagnosis(es): The primary encounter diagnosis was Coagulopathy ([address] Diagnoses of Elevated INR and Leukocytosis, unspecified type were also pertinent to this visit.      has a past medical history of Atherosclerosis, Benign prostatic hyperplasia, Hypertension, Hypothyroidism, Osteoporosis, Paroxysmal A-fib ([address] Pneumonia, Rhabdomyolysis, Smoking, (Pt reports independence with ADLs, but is a poor historian)  Homemaking Assistance: Needs assistance  Homemaking Responsibilities: No  Ambulation Assistance: Independent(Pt reports independence, but is a poor historian)  Transfer Assistance: Independent(Pt reports independence, but is a poor historian)  Active : No  Occupation: Retired  Leisure & Hobbies: [address] [place] [address] Blvd: Per [address] PT Department: Amb up to 50ft with Min A with knee immobilizer on at all times WBAT  Cognition   Cognition  Overall Cognitive Status: Exceptions  Arousal/Alertness: Delayed responses to stimuli  Following Commands: Follows one step commands with repetition; Follows one step commands with increased time  Attention Span: Attends with cues to redirect  Memory: Decreased recall of biographical Information  Safety Judgement: Decreased awareness of need for assistance;Decreased awareness of need for safety  Problem Solving: Assistance required to generate solutions;Assistance required to implement solutions;Decreased awareness of errors  Insights: Decreased awareness of deficits  Initiation: Requires cues for some  Sequencing: Requires cues for some    Objective     Observation/Palpation  Posture: Fair    AROM RLE (degrees)  RLE AROM: WFL  RLE General AROM: Unable to assess Knee ROM due to knee immobilizer  AROM LLE (degrees)  LLE AROM : WFL  AROM RUE (degrees)  RUE AROM : WFL  AROM LUE (degrees)  LUE AROM : WFL  Strength RLE  Strength RLE: WFL  Comment: Unable to test knee flexion/extension due to knee immobilizer; Grossly 4/5  Strength LLE  Strength LLE: WFL  Comment: Grossly 4/5  Strength RUE  Strength RUE: WFL  Comment: Grossly 4/5  Strength LUE  Strength LUE: WFL  Comment: Grossly 4/5  Motor Control  Gross Motor?: WFL  Sensation  Overall Sensation Status: Impaired(Pt complains of throat numbness/tingling)  Bed mobility  Supine to Sit: Contact guard assistance  Sit to Supine: Contact guard assistance  Scooting: Contact guard assistance  Comment: with HOB raised ~60* and significant use of handrail; impulsive  Transfers  Sit to Stand: Contact guard assistance;Minimal Assistance; Moderate Assistance;2 Person Assistance  Stand to sit: Contact guard assistance;Minimal Assistance; Moderate Assistance;2 Person Assistance  Comment: Pt performed sit <> stand from EOB x4 and from chair x1; CGA from chair; Min A to Mod A x2 from EOB. Pt is impulsive with transfers. Pt requires maximum verbal/tactile cues for initiation and safety. Ambulation  Ambulation?: Yes  More Ambulation?: No  Ambulation 1  Surface: level tile  Device: Rolling Walker  Assistance: Minimal assistance; Moderate assistance;2 Person assistance  Quality of Gait: Pt ambulates with moderate to severe unsteadiness, dec endurance, decreased gait speed, staggering gait pattern, and labored breathing. Gait Deviations: Decreased step length;Decreased step height;Deviated path;Staggers; Slow Chiara  Distance: 2ft x2  Comments: Pt requires maximum verbal and tactile cues for initiation, sequencing, and safety. Stairs/Curb  Stairs?: No     Balance  Posture: Fair  Sitting - Static: Fair;+  Sitting - Dynamic: Fair;-  Standing - Static: Poor;+  Standing - Dynamic: Poor;-  Comments: Pt performed static sitting from EOB for ~10 minutes; Pt performed standing balance with RW, performed static standing balance for ~1.5-2 minutes x4 before requiring a seated rest break with verbal cues to perform deep breathing to return O2 above 90%. Standing balance O2 fluctuated between 86-92%.         Plan   Plan  Times per week: 5-6x  Times per day: Daily  Current Treatment Recommendations: Strengthening, Home Exercise Program, Endurance Training, Balance Training, Gait Training, Transfer Training, Functional Mobility Training  Safety Devices  Type of devices: Left in chair, Nurse notified, Gait belt, Patient at risk for falls, Chair alarm in place, Call light within reach  Restraints  Initially in place: No    AM-PAC Score     AM-PAC Inpatient Mobility without Stair Climbing Raw Score : 12 (12/15/20 1120)  AM-PAC Inpatient without Stair Climbing T-Scale Score : 37.26 (12/15/20 1120)  Mobility Inpatient CMS 0-100% Score: 63.03 (12/15/20 1120)  Mobility Inpatient without Stair CMS G-Code Modifier : CL (12/15/20 1120)       Goals  Short term goals  Time Frame for Short term goals: 14  Short term goal 1: Pt will ambulate 150ft with modified independence using a RW in order to navigate within his home. Short term goal 2: Pt will tolerate 45 minutes of therapy to improve his endurance. Short term goal 3: Pt will transfer sit <> stand with modified independence using a RW to get off the toilet safely. Short term goal 4: Pt will transfer supine <> sit with modified independence using a bed rail to get out of bed safely. Short term goal 5: Pt will improve his dynamic standing balance to Good (-) to decrease his fall risk. Patient Goals   Patient goals : To return to his PLOF       Therapy Time   Individual Concurrent Group Co-treatment   Time In 1013         Time Out 1056         Minutes 43         Timed Code Treatment Minutes: 23 Minutes   Co-evaluation with OT. BRENDA Luna  Evaluation/treatment performed by Student PT under the supervision of co-signing PT who agrees with all evaluation/treatment and documentation.

## 2020-12-15 NOTE — ED NOTES
Biju Child daughter [phone] would like update on where he is admitted.      Ashia Hodge RN  12/14/20 [id number]

## 2020-12-15 NOTE — H&P
[facility]  Office: [address], DO, Maldonado Jose Enriquese, DO, Kameronhilary Bautista, DO, Angie Oviedo, DO, Jamie Ochoa MD, Ziggy Barry MD, Jason Garcia MD, Luís Lopez MD, Hussain Quintana MD, Gaye Eddy MD, Michelle Seaman MD, Yessi Oakley MD, Leandra Vizcarra MD, Columba Guillen DO, Govind Duarte MD, Arlyn Soto MD, Aron Johnston DO, Bharati Simms MD,  Nathan Maldonado DO, Thomas Martinez MD, Mohsen Bunch MD, Gray Mccormick, CNP, [facility], CNP, Krishna Landeros, CNP, Susan Ba, CNS, Mae Hughes, CNP, Marquita Ball, CNP, Sandra Scott, CNP, Jamal Wiley, CNP, Dylon Chanel, CNP, Rhianna Nice PA-C, Genesis Zuniga, [facility], Pam Stroud, CNP, Ilia Jordan, CNP, Mikey Day, CNP, Cathy Valentin, CNP, Anai Wong, CNP         [facility]   [address] [facility]    HISTORY AND PHYSICAL EXAMINATION            Date:   12/15/2020  Patient name:  Darin Addison  Date of admission:  12/14/2020  5:20 PM  MRN:   [id number]  Account:  [de-identified]  YOB: 1935  PCP:    Suzy Caldera MD  Room:   [address] [place] [address]  Code Status:    Full Code    Chief Complaint:     Chief Complaint   Patient presents with    Abnormal Lab     possibly PTINR, nursing home told EMS the patient was bleeding out because of his labs, no complaints at this time    Abrasion     skin tears       History Obtained From:     patient, electronic medical record    History of Present Illness:     Darin Addison is a 82 y.o. Non-/non  male who presents with Abnormal Lab (possibly PTINR, nursing home told EMS the patient was bleeding out because of his labs, no complaints at this time) and Abrasion (skin tears)   and is admitted to the hospital for the management of Supratherapeutic INR.     During my assessment the patient really was not able to tell me what brought him to the hospital.  According to the ER note the nursing home he resides at called EMS reported they were concerned the patient was bleeding out because of his elevated INR. According to the ER notes the ER physician spoke to the patient's nursing home who told him his INR was around 9. His INR obtained here was > 14.8. He received vitamin K 10 mg IV while in the ER and admitted for further evaluation and work-up. The INR obtained at 1 AM was 2.3 and then this morning 1.7. I have asked pharmacy to resume Coumadin dosing. CBC is stable. Nursing was concerned the patient may be aspirating with meals related to coughing/choking spells while eating. Chest x-ray completed shows bibasilar infiltrates concerning for pneumonia. The patient tells me he does not eat anything solid like sandwiches anymore. He is unable to give me a good history. He does report coughing and shortness of breath for at least a month. He also remembers falling and being in the hospital for a few days in November. He reports feeling constipated. He does not think he had a bowel movement in 4 days. But he also complains that the food at the facility is terrible or he just cannot eat it so that is why he has had a bowel movement. During my assessment he does have mild rhonchi throughout with expiratory wheezing intermittently on the left and in right upper lobe. Related to patient history of MRSA Zosyn and Cipro started prophylactically for pneumonia. Procalcitonin and sputum culture ordered. Continue to monitor for signs of hypoxia, respiratory distress or fever. Related to the patient's recent + urine culture will check urine culture and blood cultures prior to antibiotics. Supratherapeutic INR resolved. Elevation may be the result of current antibiotic use.      According to care everywhere the patient was admitted to [facility] from November 5 through the number 11th related to pneumonia of the right lower lobe, a fall, a contusion to his back, nontraumatic rhabdomyolysis and a closed nondisplaced fracture of the right patella. During that visit he was also diagnosed with a Klebsiella UTI. Keflex was completed on 11/11/2020. At the time of his discharge he was supposed to be nonweightbearing per Ortho. His white count at the time of discharge was 10.8. INR was 2.8. A chest x-ray completed during that visit also recommended a follow-up CT after completing antibiotics related to patchy infiltrates and/or atelectasis in the right lower lobe. MRSA PCR positive        Past Medical History:     Past Medical History:   Diagnosis Date    Atherosclerosis     Benign prostatic hyperplasia     Hypertension     Hypothyroidism     Osteoporosis     Paroxysmal A-fib ([address]     Pneumonia     Rhabdomyolysis     Smoking     Spondylolisthesis     Vitamin D deficiency         Past Surgical History:     Past Surgical History:   Procedure Laterality Date    ANKLE SURGERY      HIP SURGERY      TONSILLECTOMY AND ADENOIDECTOMY          Medications Prior to Admission:     Prior to Admission medications    Medication Sig Start Date End Date Taking? Authorizing Provider   warfarin (COUMADIN) 3 MG tablet Take 3.5 mg by mouth daily  11/11/20  Yes Historical Provider, MD   digoxin (LANOXIN) 125 MCG tablet Take 125 mcg by mouth daily   Yes Historical Provider, MD   dilTIAZem (CARDIZEM LA) 120 MG TB24 extended release tablet Take 120 mg by mouth daily   Yes Historical Provider, MD   ergocalciferol (ERGOCALCIFEROL) 1.25 MG (87917 UT) capsule Take 50,000 Units by mouth once a week Saturday 11/14/20 12/14/20 Yes Historical Provider, MD   finasteride (PROSCAR) 5 MG tablet Take 5 mg by mouth daily   Yes Historical Provider, MD   oxyCODONE-acetaminophen (PERCOCET) 5-325 MG per tablet Take 1-2 tablets by mouth every 6 hours.  11/11/20  Yes Historical Provider, MD   tamsulosin (FLOMAX) 0.4 MG capsule Take 0.8 mg by mouth nightly   Yes Historical Provider, MD   levothyroxine (SYNTHROID) 100 MCG tablet Take 75 mcg by mouth daily   Yes Historical Provider, MD   collagenase 250 UNIT/GM ointment Apply 250 g topically daily 20  Yes Historical Provider, MD   acetaminophen (TYLENOL) 325 MG tablet Take 650 mg by mouth every 4 hours as needed for Pain   Yes Historical Provider, MD   Cholecalciferol (VITAMIN D3) 125 MCG (5000 UT) TABS Take by mouth daily   Yes Historical Provider, MD        Allergies:     Patient has no known allergies. Social History:     Tobacco:    has no history on file for tobacco.  Alcohol:      has no history on file for alcohol. Drug Use:  has no history on file for drug. Family History:     History reviewed. No pertinent family history. Review of Systems:     Positive and Negative as described in HPI. Review of Systems   Constitutional: Negative for chills, diaphoresis and fever. HENT: Negative for congestion and hearing loss. Respiratory: Positive for cough and shortness of breath. Negative for wheezing and stridor. Cardiovascular: Negative for chest pain, palpitations and leg swelling. Gastrointestinal: Positive for constipation. Negative for abdominal pain, blood in stool, diarrhea, nausea and vomiting. Genitourinary: Negative for dysuria and frequency. Musculoskeletal: Positive for gait problem and myalgias. Skin: Positive for color change. Negative for rash. Neurological: Negative for dizziness, seizures and headaches. Psychiatric/Behavioral: The patient is not nervous/anxious. Physical Exam:   BP (!) 97/56   Pulse 62   Temp 97.9 °F (36.6 °C) (Oral)   Resp 24   Ht 6' 3\" (1.905 m)   Wt 185 lb (83.9 kg)   SpO2 100%   BMI 23.12 kg/m²   Temp (24hrs), Av.8 °F (36.6 °C), Min:97.5 °F (36.4 °C), Max:98.3 °F (36.8 °C)    No results for input(s): POCGLU in the last 72 hours.     Intake/Output Summary (Last 24 hours) at 12/15/2020 1215  Last data filed at 12/15/2020 0640  Gross per 24 hour   Intake 420 ml   Output 475 ml   Net -55 ml       Physical Exam  Vitals signs and nursing note (L) 41 - 53 %    MCV 88.6 80 - 100 fL    MCH 28.8 26 - 34 pg    MCHC 32.6 31 - 37 g/dL    RDW 18.9 (H) 12.5 - 15.4 %    Platelets 270 (H) 560 - 450 k/uL    MPV 7.8 6.0 - 12.0 fL    NRBC Automated NOT REPORTED per 100 WBC    Differential Type NOT REPORTED     Immature Granulocytes NOT REPORTED 0 %    Absolute Immature Granulocyte NOT REPORTED 0.00 - 0.30 k/uL    WBC Morphology NOT REPORTED     RBC Morphology NOT REPORTED     Platelet Estimate NOT REPORTED     Seg Neutrophils 91 (H) 36 - 66 %    Lymphocytes 7 (L) 24 - 44 %    Monocytes 2 1 - 7 %    Eosinophils % 0 (L) 1 - 4 %    Basophils 0 0 - 2 %    Segs Absolute 16.19 (H) 1.8 - 7.7 k/uL    Absolute Lymph # 1.25 1.0 - 4.8 k/uL    Absolute Mono # 0.36 0.1 - 0.8 k/uL    Absolute Eos # 0.00 0.0 - 0.4 k/uL    Basophils Absolute 0.00 0.0 - 0.2 k/uL    Morphology Normal    Protime-INR    Collection Time: 12/14/20  5:25 PM   Result Value Ref Range    Protime >150.0 (H) 9.4 - 12.6 sec    INR >14.8 (HH)    APTT    Collection Time: 12/14/20  5:25 PM   Result Value Ref Range    PTT 84.4 (H) 21.3 - 31.3 sec   Comprehensive Metabolic Panel w/ Reflex to MG    Collection Time: 12/14/20  5:25 PM   Result Value Ref Range    Glucose 128 (H) 70 - 99 mg/dL    BUN 22 8 - 23 mg/dL    CREATININE 0.96 0.70 - 1.20 mg/dL    Bun/Cre Ratio NOT REPORTED 9 - 20    Calcium 9.7 8.6 - 10.4 mg/dL    Sodium 138 135 - 144 mmol/L    Potassium 3.5 (L) 3.7 - 5.3 mmol/L    Chloride 98 98 - 107 mmol/L    CO2 29 20 - 31 mmol/L    Anion Gap 11 9 - 17 mmol/L    Alkaline Phosphatase 106 40 - 129 U/L    ALT 12 5 - 41 U/L    AST 27 <40 U/L    Total Bilirubin 0.78 0.3 - 1.2 mg/dL    Total Protein 6.6 6.4 - 8.3 g/dL    Alb 3.1 (L) 3.5 - 5.2 g/dL    Albumin/Globulin Ratio 0.9 (L) 1.0 - 2.5    GFR Non-African American >60 >60 mL/min    GFR African American >60 >60 mL/min    GFR Comment          GFR Staging NOT REPORTED    Magnesium    Collection Time: 12/14/20  5:25 PM   Result Value Ref Range    Magnesium 2.2 1.6 - 2.6 mg/dL   COVID-19    Collection Time: 12/14/20  7:19 PM    Specimen: Other   Result Value Ref Range    SARS-CoV-2          SARS-CoV-2, Rapid Not Detected Not Detected    Source . NASOPHARYNGEAL SWAB     SARS-CoV-2         Urinalysis Reflex to Culture    Collection Time: 12/14/20 10:50 PM    Specimen: Urine, clean catch   Result Value Ref Range    Color, UA YELLOW YELLOW    Turbidity UA CLEAR CLEAR    Glucose, Ur NEGATIVE NEGATIVE    Bilirubin Urine NEGATIVE NEGATIVE    Ketones, Urine NEGATIVE NEGATIVE    Specific Gravity, UA 1.024 1.005 - 1.030    Urine Hgb SMALL (A) NEGATIVE    pH, UA 5.5 5.0 - 8.0    Protein, UA NEGATIVE NEGATIVE    Urobilinogen, Urine Normal Normal    Nitrite, Urine NEGATIVE NEGATIVE    Leukocyte Esterase, Urine NEGATIVE NEGATIVE    Urinalysis Comments NOT REPORTED    Microscopic Urinalysis    Collection Time: 12/14/20 10:50 PM   Result Value Ref Range    -          WBC, UA 5 TO 10 0 - 5 /HPF    RBC, UA 5 TO 10 0 - 2 /HPF    Casts UA NOT REPORTED /LPF    Crystals, UA MODERATE CALCIUM OXALATE (A) None /HPF    Epithelial Cells UA 5 TO 10 0 - 5 /HPF    Renal Epithelial, UA NOT REPORTED 0 /HPF    Bacteria, UA NOT REPORTED None    Mucus, UA NOT REPORTED None    Trichomonas, UA NOT REPORTED None    Amorphous, UA NOT REPORTED None    Other Observations UA (A) NOT REQ. Utilizing a urinalysis as the only screening method to exclude a potential uropathogen can be unreliable in many patient populations. Rapid screening tests are less sensitive than culture and if UTI is a clinical possibility, culture should be considered despite a negative urinalysis.     Yeast, UA NOT REPORTED None   PROTIME-INR    Collection Time: 12/15/20  1:38 AM   Result Value Ref Range    Protime 23.9 (H) 9.4 - 12.6 sec    INR 2.3    Basic Metabolic Panel w/ Reflex to MG    Collection Time: 12/15/20  5:59 AM   Result Value Ref Range    Glucose 116 (H) 70 - 99 mg/dL    BUN 16 8 - 23 mg/dL    CREATININE 0.74 0.70 - 1.20 mg/dL Bun/Cre Ratio NOT REPORTED 9 - 20    Calcium 9.3 8.6 - 10.4 mg/dL    Sodium 139 135 - 144 mmol/L    Potassium 3.4 (L) 3.7 - 5.3 mmol/L    Chloride 102 98 - 107 mmol/L    CO2 29 20 - 31 mmol/L    Anion Gap 8 (L) 9 - 17 mmol/L    GFR Non-African American >60 >60 mL/min    GFR African American >60 >60 mL/min    GFR Comment          GFR Staging NOT REPORTED    CBC    Collection Time: 12/15/20  5:59 AM   Result Value Ref Range    WBC 15.0 (H) 3.5 - 11.0 k/uL    RBC 3.57 (L) 4.5 - 5.9 m/uL    Hemoglobin 10.3 (L) 13.5 - 17.5 g/dL    Hematocrit 32.0 (L) 41 - 53 %    MCV 89.5 80 - 100 fL    MCH 28.8 26 - 34 pg    MCHC 32.1 31 - 37 g/dL    RDW 18.6 (H) 12.5 - 15.4 %    Platelets 259 799 - 694 k/uL    MPV 7.4 6.0 - 12.0 fL    NRBC Automated NOT REPORTED per 100 WBC   PROTIME-INR    Collection Time: 12/15/20  5:59 AM   Result Value Ref Range    Protime 17.8 (H) 9.4 - 12.6 sec    INR 1.7    Magnesium    Collection Time: 12/15/20  5:59 AM   Result Value Ref Range    Magnesium 2.2 1.6 - 2.6 mg/dL       Imaging/Diagnostics:    Xr Chest Portable    Result Date: 12/15/2020  Bibasilar infiltrates concerning for pneumonia. Assessment :      Hospital Problems           Last Modified POA    * (Principal) Supratherapeutic INR 12/15/2020 Yes    Overview Signed 12/15/2020  7:12 AM by VAIBHAV Villarreal CNP     11/6/2020         Age-related osteoporosis with current pathological fracture (Chronic) 12/15/2020 Yes    Hypertension 12/15/2020 Yes    Hypothyroidism 12/15/2020 Yes    Benign prostatic hyperplasia 12/15/2020 Yes    Paroxysmal atrial fibrillation ([facility] [address] 12/15/2020 Yes    Vitamin D deficiency 12/15/2020 Yes    Chronic respiratory failure with hypoxia, on home oxygen therapy ([facility] [address] 12/15/2020 Yes    Pneumonia due to infectious organism 12/15/2020 Yes          Plan:     Patient status inpatient in the Med/Surge    Bibasilar pneumonia; recent hospitalization and positive PCR for MRSA. Start Zosyn and Cipro IV.   Attempt to obtain sputum culture. Blood cultures and urine culture pending. DuoNeb. Chronic oxygen use at 2.5. Currently requiring 4 L may benefit from pulmonary consult    Supratherapeutic INR resolved. Pharmacy consulted for Coumadin dosing denied history of A. Fib. Monitor INR daily    Resume home Lanoxin. Check dig level    Continue home Cardizem, Proscar, Synthroid, and Flomax    General dental soft diet with high-calorie supplements. Consult speech therapy for their accommodation    PT/OT evaluate and treat    Check vitamin D level        Consultations:   PHARMACY TO DOSE WARFARIN  IP CONSULT TO PHARMACY     Patient is admitted as inpatient status because of co-morbidities listed above, severity of signs and symptoms as outlined, requirement for current medical therapies and most importantly because of direct risk to patient if care not provided in a hospital setting. Expected length of stay > 48 hours.     VAIBHAV Hurtado CNP  12/15/2020  12:15 PM    Copy sent to Dr. Jose Hargrove MD

## 2020-12-15 NOTE — PROGRESS NOTES
Speech Language Pathology  Facility/Department: [facility]   CLINICAL BEDSIDE SWALLOW EVALUATION    NAME: Tacho Malhotra  : 1935  MRN: [id number]    ADMISSION DATE: 2020  ADMITTING DIAGNOSIS: has Supratherapeutic INR; Age-related osteoporosis with current pathological fracture; Atherosclerotic peripheral vascular disease ([address] Closed nondisplaced longitudinal fracture of right patella; Pathological compression fracture of lumbar vertebra ([address] Hypertension; Hypothyroidism; Benign prostatic hyperplasia; Paroxysmal atrial fibrillation ([address] Vitamin D deficiency; Chronic respiratory failure with hypoxia, on home oxygen therapy ([address] and Pneumonia due to infectious organism on their problem list.    Recent Chest Xray/CT of Chest:   12/15- CXR- bibasilar infiltrates concerning for pneumonia    Date of Eval: 12/15/2020  Evaluating Therapist: Carlee Oneal    Current Diet level:  Current Diet : (dental soft)  Current Liquid Diet : Thin      Primary Complaint   Pt. Currently resides at nursing home. Pt. Was found to have pneumonia. Nursing staff report pt. Coughing when eating meals. Pain:   Pt. denies    Reason for Referral  Terrell Lopez was referred for a bedside swallow evaluation to assess the efficiency of his swallow function, identify signs and symptoms of aspiration and make recommendations regarding safe dietary consistencies, effective compensatory strategies, and safe eating environment. Impression  Dysphagia Diagnosis: Suspected needs further assessment  Dysphagia Impression : Pt. demonstrates no overt s/s aspiration at this time. Unable to r/o SILENT aspiration at bedside. Pt. reportedly frequently coughing when eating meals. Pt. seen for cervical spine CT at [facility] (found in care everywhere) on 2020. Results found anterior spurring present diffusely c prominent posterior  spurs at C5-6 and C6-7.    Bone spurs could be negatively affecting pt. swallow, would recommend video swallow study to further investigate and r/o SILENT aspiration. MBS unable to be completed at this facility. RN reports pt. May go to [facility] tomorrow [facility] if one is recommended by ST following this assessment. Recommended Diet and Intervention  Diet Solids Recommendation: (pending MBS)  Liquid Consistency Recommendation: (pending MBS)     Recommendations: Modified barium swallow study       Treatment/Goals   Pending MBS results. General  Behavior/Cognition: Alert; Cooperative;Confused  Respiratory Status: O2 via nasual cannula  Breath Sounds: Clear  O2 Device: Nasal cannula  Communication Observation: Functional  Follows Directions: Complex  Dentition: Dentures top  Patient Positioning: Upright in bed  Baseline Vocal Quality: Normal  Consistencies Administered: Dysphagia Soft and Bite-Sized (Dysphagia III); Pudding - teaspoon; Thin - straw           Vision/Hearing  Vision  Vision: Impaired  Vision Exceptions: Wears glasses for reading  Hearing  Hearing: Exceptions to [facility]  Hearing Exceptions: Hard of hearing/hearing concerns    Oral Motor Deficits  Oral/Motor  Oral Motor: Within functional limits    Oral Phase Dysfunction  Oral Phase  Oral Phase: WNL     Indicators of Pharyngeal Phase Dysfunction   Pharyngeal Phase  Pharyngeal Phase: WNL      Education  Patient Education Response: Needs reinforcement;Verbalizes understanding             Therapy Time  SLP Individual Minutes  Time In: 5950  Time Out: 1000 [place]  Minutes: 8          Spero Pintos. A.CCC/SLP    12/15/2020 3:59 PM

## 2020-12-15 NOTE — PLAN OF CARE
Nutrition Problem #1: Increased nutrient needs  Intervention: Food and/or Nutrient Delivery: Continue Current Diet, Start Oral Nutrition Supplement  Nutritional Goals: PO intakes to meet >75% estimated needs.

## 2020-12-15 NOTE — PROGRESS NOTES
Pt arrived from ED, VS stable on 4L NC, decreased to 3L on arrival to . Pt has large bruise/hematoma on right hip, no scan completed. NP Ganesh Murphy notified. Will monitor bruise and H/H overnight. Right AC IV dressing changed upon arrival.   Pt has zero c/o pain, has complete ROM in all extremities. Pt is a poor historian and does not recall how it happened. Has a knee immobilizer for right patella fracture, pt's lower leg/knee looks great underneath the immobilizer, no bruising present below the right thigh. Has multiple skin tears and bruising on arms, and left shin, very dry flaky skin, bilateral heel PU's red and scabbed over. Also has bilateral buttock redness and open areas on coccyx.

## 2020-12-15 NOTE — PROGRESS NOTES
Pt arrived to floor with 4lpm nasal cannula O2. It is reported to me that patient usually uses 2.5 liters at home. spo2 % on the 4 liters, so he has been turned down to 3 lpm.  Breath sounds clear/ diminished throughout although patient has some audible upper airway congestion. Pt does have strong congested cough on request. Pt placed on continuous pulse ox. Will wean O2 to patient baseline as tolerated.

## 2020-12-15 NOTE — PROGRESS NOTES
Pt instructed on acapella, good effort by patient. Strong congested spontaneous non productive cough after patient used acapella.

## 2020-12-15 NOTE — ED PROVIDER NOTES
FACULTY SIGN-OUT  ADDENDUM     Care of this patient was assumed from Dr. Beverley Ayala. The patient was seen for Abnormal Lab (possibly PTINR, nursing home told EMS the patient was bleeding out because of his labs, no complaints at this time) and Abrasion (skin tears)  . The patient's initial evaluation and plan have been discussed with the prior provider who initially evaluated the patient. Nursing Notes, Past Medical Hx, Past Surgical Hx, Social Hx, Allergies, and Family Hx were all reviewed. CHIEF COMPLAINT       Chief Complaint   Patient presents with    Abnormal Lab     possibly PTINR, nursing home told EMS the patient was bleeding out because of his labs, no complaints at this time    Abrasion     skin tears         PAST MEDICAL HISTORY    has a past medical history of Atherosclerosis, Benign prostatic hyperplasia, Hypertension, Hypothyroidism, Osteoporosis, Paroxysmal A-fib ([address] Pneumonia, Rhabdomyolysis, Spondylolisthesis, and Vitamin D deficiency. SURGICAL HISTORY      has no past surgical history on file. CURRENT MEDICATIONS       Current Discharge Medication List      CONTINUE these medications which have NOT CHANGED    Details   warfarin (COUMADIN) 3 MG tablet Take 3 mg by mouth daily      digoxin (LANOXIN) 125 MCG tablet Take 125 mcg by mouth daily      dilTIAZem (CARDIZEM LA) 120 MG TB24 extended release tablet Take 120 mg by mouth daily      ergocalciferol (ERGOCALCIFEROL) 1.25 MG (07058 UT) capsule Take 50,000 Units by mouth once a week Saturday      finasteride (PROSCAR) 5 MG tablet Take 5 mg by mouth daily      oxyCODONE-acetaminophen (PERCOCET) 5-325 MG per tablet Take 1-2 tablets by mouth every 6 hours.       tamsulosin (FLOMAX) 0.4 MG capsule Take 0.8 mg by mouth nightly      levothyroxine (SYNTHROID) 100 MCG tablet Take 75 mcg by mouth daily      collagenase 250 UNIT/GM ointment Apply 250 g topically daily      acetaminophen (TYLENOL) 325 MG tablet Take 650 mg by mouth every 4 hours as needed for Pain      Cholecalciferol (VITAMIN D3) 125 MCG (5000 UT) TABS Take by mouth daily             ALLERGIES     has No Known Allergies. FAMILY HISTORY     has no family status information on file. family history is not on file. SOCIAL HISTORY            DIAGNOSTIC RESULTS     EKG: All EKG's are interpreted by the Emergency Department Physician who either signs or Co-signs this chart in the absence of a cardiologist.        RADIOLOGY:   Non-plain film images such as CT, Ultrasound and MRI are read by the radiologist. Plain radiographic images are visualized and the radiologist interpretations are reviewed as follows: No orders to display       No results found.       LABS:  Results for orders placed or performed during the hospital encounter of 12/14/20   CBC Auto Differential   Result Value Ref Range    WBC 17.8 (H) 3.5 - 11.0 k/uL    RBC 3.58 (L) 4.5 - 5.9 m/uL    Hemoglobin 10.3 (L) 13.5 - 17.5 g/dL    Hematocrit 31.7 (L) 41 - 53 %    MCV 88.6 80 - 100 fL    MCH 28.8 26 - 34 pg    MCHC 32.6 31 - 37 g/dL    RDW 18.9 (H) 12.5 - 15.4 %    Platelets 836 (H) 634 - 450 k/uL    MPV 7.8 6.0 - 12.0 fL    NRBC Automated NOT REPORTED per 100 WBC    Differential Type NOT REPORTED     Immature Granulocytes NOT REPORTED 0 %    Absolute Immature Granulocyte NOT REPORTED 0.00 - 0.30 k/uL    WBC Morphology NOT REPORTED     RBC Morphology NOT REPORTED     Platelet Estimate NOT REPORTED     Seg Neutrophils 91 (H) 36 - 66 %    Lymphocytes 7 (L) 24 - 44 %    Monocytes 2 1 - 7 %    Eosinophils % 0 (L) 1 - 4 %    Basophils 0 0 - 2 %    Segs Absolute 16.19 (H) 1.8 - 7.7 k/uL    Absolute Lymph # 1.25 1.0 - 4.8 k/uL    Absolute Mono # 0.36 0.1 - 0.8 k/uL    Absolute Eos # 0.00 0.0 - 0.4 k/uL    Basophils Absolute 0.00 0.0 - 0.2 k/uL    Morphology Normal    Protime-INR   Result Value Ref Range    Protime >150.0 (H) 9.4 - 12.6 sec    INR >14.8 (HH)    APTT   Result Value Ref Range    PTT 84.4 (H) 21.3 - 31.3 sec Comprehensive Metabolic Panel w/ Reflex to MG   Result Value Ref Range    Glucose 128 (H) 70 - 99 mg/dL    BUN 22 8 - 23 mg/dL    CREATININE 0.96 0.70 - 1.20 mg/dL    Bun/Cre Ratio NOT REPORTED 9 - 20    Calcium 9.7 8.6 - 10.4 mg/dL    Sodium 138 135 - 144 mmol/L    Potassium 3.5 (L) 3.7 - 5.3 mmol/L    Chloride 98 98 - 107 mmol/L    CO2 29 20 - 31 mmol/L    Anion Gap 11 9 - 17 mmol/L    Alkaline Phosphatase 106 40 - 129 U/L    ALT 12 5 - 41 U/L    AST 27 <40 U/L    Total Bilirubin 0.78 0.3 - 1.2 mg/dL    Total Protein 6.6 6.4 - 8.3 g/dL    Alb 3.1 (L) 3.5 - 5.2 g/dL    Albumin/Globulin Ratio 0.9 (L) 1.0 - 2.5    GFR Non-African American >60 >60 mL/min    GFR African American >60 >60 mL/min    GFR Comment          GFR Staging NOT REPORTED    Magnesium   Result Value Ref Range    Magnesium 2.2 1.6 - 2.6 mg/dL         EMERGENCY DEPARTMENT COURSE:   Recent Vitals:    Vitals:    12/14/20 1723   BP: 117/69   Pulse: 99   Resp: 16   Temp: 98.3 °F (36.8 °C)   TempSrc: Oral   SpO2: 92%   Weight: 86.2 kg (190 lb)   Height: 6' 3\" (1.905 m)     -------------------------  BP: 117/69, Temp: 98.3 °F (36.8 °C), Pulse: 99, Resp: 16      The patient was given the following medications:  Orders Placed This Encounter   Medications    DISCONTD: phytonadione ADULT (VITAMIN K) inj 10 mg/mL    phytonadione (ADULT) (VITAMIN K) 10 mg in dextrose 5 % 100 mL IVPB           MEDICAL DECISION MAKING:     Patient presents with elevated INR. It was almost 10 this morning. Notes over 14. Was given IV vitamin K prior to my arrival.  Plan will be to admit the patient for further monitoring. No signs of bleeding on physical exam.  He does have leukocytosis as well of unknown origin. I did examine the patient and did not see any evidence of cellulitis or obvious signs of infection. His abdomen is soft and benign. He denies any chest pain or difficulty breathing. Denies any GI or  symptoms.   He had a urine done this morning with his other labs that showed no evidence of obvious infection. Clinically he otherwise appears well and appropriately hydrated. Plan to be to admit. I spoke with the hospitalist who accepted admission of the patient. Vitamin K given prior to my arrival IV. CONSULTS:    None    CRITICAL CARE:     None    PROCEDURES:    None    FINAL IMPRESSION      1. Coagulopathy (HCC)    2. Elevated INR    3. Leukocytosis, unspecified type          DISPOSITION/PLAN   DISPOSITION Decision To Admit 12/14/2020 07:12:17 PM      Condition on Disposition    Improved    PATIENT REFERRED TO:  No follow-up provider specified. DISCHARGE MEDICATIONS:  Current Discharge Medication List          (Please note that portions of this note were completed with a voice recognition program.  Efforts were made to edit the dictations but occasionally words are mis-transcribed.)        Lopez Mckeon D.O.   Emergency Medicine Attending       Ivanna Wakefield DO  12/14/20 1922

## 2020-12-16 NOTE — CARE COORDINATION
VM retrieved from patient's daughter, requesting referral to [facility], referral sent. 1400 - call to [facility] admissions to follow on referral, left message.

## 2020-12-16 NOTE — PROGRESS NOTES
Pt alert and oriented to time, place, situation and person. However confused intermittently and does not always follow instructions. Picks at his tele cords, Nasal cannula and IV tubing. Pt pulled out his IV from his Right AC and had taken it apart into multiple pieces. He was bleeding a moderate amount, pressure held on site for about 3 minutes. Full bed change complete, bath given. New IV placed in Right AC using ultrasound guidance. IV wrapped with elastic, still able to see insertion site to assess patency.

## 2020-12-16 NOTE — PLAN OF CARE
Problem: Skin Integrity:  Goal: Will show no infection signs and symptoms  Outcome: Ongoing   Skin assessment preformed. Pt turned every 2 hours with heals elevated off bed. Waffle mattress in place. Will continue to monitor. Problem: Falls - Risk of:  Goal: Will remain free from falls  Outcome: Ongoing   Fall assessment preformed. Bed in low locked position with call light and tray table within reach. Education given. Will continue to monitor.     Problem: Confusion - Acute:  Goal: Absence of continued neurological deterioration signs and symptoms  Outcome: Ongoing   Patient redirected

## 2020-12-16 NOTE — CONSULTS
infiltrates. Similar findings are noted on his chest x-ray from yesterday. Nurses report that patient may be aspirating. Cough noted with eating as well as choking spells. The patient reportedly does not eat anything solid. This has been going on for nearly a month. No history of stroke. Reportedly the patient is quite frail and cachectic. Nasal MRSA swab was positive. Patient is currently on Zosyn and Cipro.     PMHx   Past Medical History      Diagnosis Date    Atherosclerosis     Benign prostatic hyperplasia     Hypertension     Hypothyroidism     Osteoporosis     Paroxysmal A-fib ([address]     Pneumonia     Rhabdomyolysis     Smoking     Spondylolisthesis     Vitamin D deficiency       Past Surgical History        Procedure Laterality Date    ANKLE SURGERY      HIP SURGERY      TONSILLECTOMY AND ADENOIDECTOMY         MEDS   Current Medications    warfarin (COUMADIN) daily dosing (placeholder)   Other RX Placeholder    piperacillin-tazobactam  3.375 g Intravenous Q8H    methylPREDNISolone  40 mg Intravenous Q6H    ipratropium-albuterol  1 ampule Inhalation Q4H While awake    Vitamin D  5 tablet Oral Daily    digoxin  125 mcg Oral Daily    dilTIAZem  120 mg Oral Daily    finasteride  5 mg Oral Daily    levothyroxine  75 mcg Oral Daily    tamsulosin  0.8 mg Oral Nightly    sodium chloride flush  10 mL Intravenous 2 times per day     sodium chloride flush, potassium chloride **OR** potassium alternative oral replacement **OR** potassium chloride, magnesium sulfate, promethazine **OR** ondansetron, polyethylene glycol, nicotine, acetaminophen **OR** acetaminophen  IV Drips/Infusions    Home Medications  Medications Prior to Admission: warfarin (COUMADIN) 3 MG tablet, Take 3.5 mg by mouth daily   digoxin (LANOXIN) 125 MCG tablet, Take 125 mcg by mouth daily  dilTIAZem (CARDIZEM LA) 120 MG TB24 extended release tablet, Take 120 mg by mouth daily  ergocalciferol (ERGOCALCIFEROL) 1.25 MG (18451 UT) capsule, Take 50,000 Units by mouth once a week Saturday  finasteride (PROSCAR) 5 MG tablet, Take 5 mg by mouth daily  oxyCODONE-acetaminophen (PERCOCET) 5-325 MG per tablet, Take 1-2 tablets by mouth every 6 hours. tamsulosin (FLOMAX) 0.4 MG capsule, Take 0.8 mg by mouth nightly  levothyroxine (SYNTHROID) 100 MCG tablet, Take 75 mcg by mouth daily  collagenase 250 UNIT/GM ointment, Apply 250 g topically daily  acetaminophen (TYLENOL) 325 MG tablet, Take 650 mg by mouth every 4 hours as needed for Pain  Cholecalciferol (VITAMIN D3) 125 MCG (5000 UT) TABS, Take by mouth daily    DIET   Dietary Nutrition Supplements: Standard High Calorie Oral Supplement  DIET GENERAL; Dental Soft    ALLERGIES   Patient has no known allergies. SOCIAL HISTORY   Tobacco History  Social History     Tobacco Use   Smoking Status Never Smoker   Smokeless Tobacco Never Used     Alcohol History  Social History     Substance and Sexual Activity   Alcohol Use Never    Frequency: Never    Binge frequency: Never       FAMILY HISTORY   History reviewed. No pertinent family history. SLEEP HISTORY   Not able to obtain    ROS     Unable to obtain. VITALS    height is 6' 3\" (1.905 m) and weight is 185 lb (83.9 kg). His oral temperature is 97.9 °F (36.6 °C). His blood pressure is 95/62 and his pulse is 68. His respiration is 24 and oxygen saturation is 100%.        Temperature Range: Temp: 97.9 °F (36.6 °C) Temp  Av.8 °F (36.6 °C)  Min: 97.5 °F (36.4 °C)  Max: 98 °F (36.7 °C)  BP Range:  Systolic (04ULY), LJJ:404 , Min:95 , FZJ:173     Diastolic (32ECB), EJR:23, Min:56, Max:76    Pulse Range: Pulse  Av  Min: 62  Max: 107  Respiration Range: Resp  Av.2  Min: 22  Max: 30  Current Pulse Ox[de-identified]  SpO2: 100 %  24HR Pulse Ox Range:  SpO2  Av.3 %  Min: 93 %  Max: 100 %  Oxygen Amount and Delivery: O2 Flow Rate (L/min): 4 L/min    Wt Readings from Last 3 Encounters:   12/15/20 185 lb (83.9 kg)       I/O (24 Hours)    Intake/Output Summary (Last 24 hours) at 12/15/2020 1906  Last data filed at 12/15/2020 1635  Gross per 24 hour   Intake 420 ml   Output 1225 ml   Net -805 ml       EXAM     Not performed.     DATA   Old records and notes have been reviewed in [facility]    CBC     Lab Results   Component Value Date    WBC 15.0 12/15/2020    RBC 3.57 12/15/2020    HGB 10.3 12/15/2020    HCT 32.0 12/15/2020     12/15/2020    MCV 89.5 12/15/2020    MCH 28.8 12/15/2020    MCHC 32.1 12/15/2020    RDW 18.6 12/15/2020    LYMPHOPCT 7 12/14/2020    MONOPCT 2 12/14/2020    BASOPCT 0 12/14/2020    MONOSABS 0.36 12/14/2020    LYMPHSABS 1.25 12/14/2020    EOSABS 0.00 12/14/2020    BASOSABS 0.00 12/14/2020    DIFFTYPE NOT REPORTED 12/14/2020     BMP   Lab Results   Component Value Date     12/15/2020    K 3.4 12/15/2020     12/15/2020    CO2 29 12/15/2020    BUN 16 12/15/2020    CREATININE 0.74 12/15/2020    GLUCOSE 116 12/15/2020    CALCIUM 9.3 12/15/2020    MG 2.2 12/15/2020     LFTS  Lab Results   Component Value Date    ALKPHOS 106 12/14/2020    ALT 12 12/14/2020    AST 27 12/14/2020    PROT 6.6 12/14/2020    BILITOT 0.78 12/14/2020    LABALBU 3.1 12/14/2020     ABG   No results found for: PH, PCO2, PO2, HCO3, O2SAT  No results found for: IFIO2, MODE, SETTIDVOL, SETPEEPLABRCNT(INR)@  PTT   Lab Results   Component Value Date    APTT 84.4 (H) 12/14/2020         PFTs  N/A    CULTURES  pending    RADIOLOGY     CXR  EXAMINATION:   ONE XRAY VIEW OF THE CHEST       12/15/2020 10:22 am       COMPARISON:   None.       HISTORY:   ORDERING SYSTEM PROVIDED HISTORY: possible aspiration   TECHNOLOGIST PROVIDED HISTORY:   possible aspiration   Reason for Exam: Possible aspiration   Acuity: Acute   Type of Exam: Initial       FINDINGS:   There are bibasilar infiltrates.  There is no effusion.  There is no   pneumothorax.  The mediastinal structures are unremarkable.  The upper   abdomen is unremarkable.  The extrathoracic soft tissues are unremarkable.           Impression   Bibasilar infiltrates concerning for pneumonia.          CT Scans    (See actual reports for details)    \"Thank you for the kind referral. If any questions or problems arise, please don't hesitate to contact us\"    Electronically signed by Elyse Hernández DO on 12/15/2020 at 7:06 PM

## 2020-12-16 NOTE — PROGRESS NOTES
Patient continues to be agitated and anxious. constantly on call light and complaining of being tangled up on cords. Patient redirected and emotional support given. Patient continues to be anxious/ agitated. Serge Mott NP updated and at bedside for assessment. Orders to be placed.

## 2020-12-16 NOTE — PROGRESS NOTES
Pharmacy Note  Warfarin Consult follow-up      Recent Labs     12/16/20  0552   INR 1.3     Recent Labs     12/14/20  1725 12/15/20  0559 12/16/20  0552   HGB 10.3* 10.3* 9.1*   HCT 31.7* 32.0* 29.3*   * [phone]       Significant Drug-Drug Interactions:  New warfarin drug-drug interactions: Solu-Medrol    Date INR Dose   12/15/20 1.7 3.5 mg   12/16/20 1.3  5 mg          Notes:                   Pt received 10 mg IV vit K on 12/14. Coumadin 5 mg x 1 tonight. Noted that the patient has started Solu-Medrol. Daily PT/INR while inpatient. Thank you. Will continue to follow.   Jey Jasmine, WilmanD

## 2020-12-16 NOTE — PROGRESS NOTES
[facility]. Telemetry activated. Patient oriented to room and use of call light. Call light and personal items within reach. POC and education initiated and reviewed with patient. Telemetry-      1     . Denied further needs or questions at this time. Will continue to monitor.

## 2020-12-16 NOTE — PROGRESS NOTES
Speech Language Pathology  [facility]  Speech Language Pathology    Date: 12/16/2020  Patient Name: Dudley Ireland  YOB: 1935   AGE: 82 y.o. MRN: [id number]        Patient Not Available for Speech Therapy     Due to:  [] Testing  [] Hemodialysis  [] Cancelled by RN  [] Surgery   [] Intubation/Sedation/Pain Medication  [] Medical instability  [x] Other:  Pt. Sleeping soundly. Per RN, pt. Has been medicated for anxiety and she would like for him to sleep at this time as he has not slept in awhile. Will reattempt when pt. More alert and able to participate. Completed by: Radha Corpus. A.CCC/SLP

## 2020-12-16 NOTE — PROCEDURES
INSTRUMENTAL SWALLOW REPORT  MODIFIED BARIUM SWALLOW    NAME: Júnior Oreilly   : 1935  MRN: [id number]       Date of Eval: 2020              Referring Diagnosis(es):      Past Medical History:  has a past medical history of Atherosclerosis, Benign prostatic hyperplasia, Hypertension, Hypothyroidism, Osteoporosis, Paroxysmal A-fib ([address] Pneumonia, Rhabdomyolysis, Smoking, Spondylolisthesis, and Vitamin D deficiency. Past Surgical History:  has a past surgical history that includes Ankle surgery; hip surgery; and Tonsillectomy and adenoidectomy. Current Diet Solid Consistency: NPO  Current Diet Liquid Consistency: NPO       Type of Study: Initial MBS      Patient Complaints/Reason for Referral:  Júnior Oreilly was referred for a MBS to assess the efficiency of his/her swallow function, assess for aspiration, and to make recommendations regarding safe dietary consistencies, effective compensatory strategies, and safe eating environment. Onset of problem:     Pt. Currently resides at nursing home. Pt. Was found to have pneumonia. Nursing staff report pt. Coughing when eating meals. Behavior/Cognition/Vision/Hearing:  Behavior/Cognition: Alert; Cooperative  Vision: Impaired  Hearing: Exceptions to [facility]    Impressions:  Patient presents with safe swallow for Dental Soft diet with Nectar thick liquids as evidenced by no observed aspiration noted with consistencies tested. Recommend small sips and bites, only feed when alert and awake and upright at 90 degrees for all PO intake. Recommend close monitoring for overt/clinical s/s of aspiration and D/C PO intake and complete Modified Barium Swallow Study should they occur. Results and recommendations reported to RN. Patient Position: Lateral and Patient Degrees: 90      Consistencies Administered: Dysphagia Soft and Bite-Sized (Dysphagia III); Dysphagia Pureed (Dysphagia I); Nectar  teaspoon;Nectar straw; Thin teaspoon; Thin straw    Compensatory Swallowing Strategies Attempted: Alternate solids and liquids;Eat/Feed slowly;Upright as possible for all oral intake;Small bites/sips  Postural Changes and/or Swallow Maneuvers Trialed: Upright 90 degrees      Recommended Diet:  Solid consistency: Dental Soft  Liquid consistency: Mildly Thick (Nectar)  Liquid administration via: Cup;Straw         Safe Swallow Protocol:  Supervision: Close  Compensatory Swallowing Strategies: Alternate solids and liquids;Eat/Feed slowly;Upright as possible for all oral intake;Small bites/sips    Recommendations/Treatment  Requires SLP Intervention: Yes           Postural Changes and/or Swallow Maneuvers: Upright 90 degrees      Recommended Exercises:    Therapeutic Interventions: Diet tolerance monitoring; Laryngeal exercises; Pharyngeal exercises; Tongue base strengthening         Education: Images and recommendations were reviewed with pt following this exam.   Patient Education: yes  Patient Education Response: Verbalizes understanding    Prognosis  Prognosis for safe diet advancement: good  Duration/Frequency of Treatment  Duration/Frequency of Treatment: 2-3X/week      Goals:    Long Term:     To Maximize safety with intake, optimize nutrition/hydration and minimize risk for aspiration. Short Term:     Goal 1: O/M treamtent program for dysphagia  Goal 2: The patient will tolerate recommended diet without observed clinical signs of aspiration      Oral Preparation / Oral Phase  Oral Phase: Impaired    Oral Phase: Pt. edentulous with extended mastication for solids. Decreased bolus formation and spillover to Pyriform with thin liquids    Pharyngeal Phase  Pharyngeal Phase: Impaired    Pharyngeal: Thin: deep penetration during swallow with stasis in the laryngeal vestibule. Nectar: No penetration no aspiration and min vallec and pyriform stasis.   Puree/Fruit: No penetration and no aspiraiton with min vallec and pyriform stasis    Dysphagia Outcome Severity Scale: Level 4: Mild moderate dysphagia- Intermittent supervision/cueing.  One - two diet consistencies restricted         Esophageal Phase  Esophageal Screen: WFL        Pain   Patient Currently in Pain: No  Pain Level: 0      Therapy Time:   Individual Concurrent Group Co-treatment   Time In 1000         Time Out 1015         Minutes [address] [facility], 12/16/2020, 10:25 AM

## 2020-12-16 NOTE — PROGRESS NOTES
Patient left via [facility] ot [address] [facility] [address] Jean-Baptiste for swallow study.

## 2020-12-16 NOTE — PROGRESS NOTES
Occupational Therapy  Facility/Department: [facility] ICU  Daily Treatment Note  NAME: Miranda Bravo  : 1935  MRN: [id number]    Date of Service: 2020    Discharge Recommendations:  Patient would benefit from continued therapy after discharge       Assessment   Performance deficits / Impairments: Decreased functional mobility ; Decreased ADL status; Decreased safe awareness;Decreased balance;Decreased endurance;Decreased strength;Decreased coordination;Decreased fine motor control;Decreased cognition  Prognosis: Good;Fair  Decision Making: Medium Complexity  OT Education: OT Role;Plan of Care;Precautions; ADL Adaptive Strategies;Transfer Training;Energy Conservation;Equipment  REQUIRES OT FOLLOW UP: Yes  Activity Tolerance  Activity Tolerance: Patient limited by fatigue;Treatment limited secondary to decreased cognition  Safety Devices  Safety Devices in place: Yes  Type of devices: Call light within reach;Nurse notified; Left in bed;Bed alarm in place  Restraints  Initially in place: No         Patient Diagnosis(es): The primary encounter diagnosis was Coagulopathy ([facility] [address] Diagnoses of Elevated INR and Leukocytosis, unspecified type were also pertinent to this visit. has a past medical history of Atherosclerosis, Benign prostatic hyperplasia, Hypertension, Hypothyroidism, Osteoporosis, Paroxysmal A-fib ([address] Pneumonia, Rhabdomyolysis, Smoking, Spondylolisthesis, and Vitamin D deficiency. has a past surgical history that includes Ankle surgery; hip surgery; and Tonsillectomy and adenoidectomy. Restrictions  Restrictions/Precautions  Restrictions/Precautions: Weight Bearing, Fall Risk  Required Braces or Orthoses?: Yes  Lower Extremity Weight Bearing Restrictions  Right Lower Extremity Weight Bearing: Weight Bearing As Tolerated  Required Braces or Orthoses  Right Lower Extremity Brace: Knee Immobilizer  Position Activity Restriction  Other position/activity restrictions:  Up with use of RW  Short term goal 2: Pt will independently demo good safety awareness during engagement in ADLs and functional transfers/functional mobility  Short term goal 3: Pt will independently incorporate energy conservation techniques as needed during engagement in ADLs and functional transfers/functional mobility  Short term goal 4: Pt will demo 8+ minutes standing tolerance with use of RW for increased participation in ADLs  Short term goal 5: Pt will perform feeding/grooming/UB ADL tasks with mod IND including setup and use of AE/DME PRN  Short term goal 6: Pt will perform LB ADL tasks/toileting tasks with min A including setup and use of AE/DME PRN       Therapy Time   Individual Concurrent Group Co-treatment   Time In 1136         Time Out 1215         Minutes 39         Timed Code Treatment Minutes: 23 Minutes       LISA Campo/L

## 2020-12-16 NOTE — PROGRESS NOTES
Patient very agitated sting he has to pee but cannot go. Patient assisted to edge of bed and standing but still no success. Bladder scan revealed less than 150cc in bladder. Paty Np updated. order given to straight cath. 300cc of urine out. Patient continues to press call light insisting that he is \"tangled up. \" cords organized but patient continues to pull at iv and cords despite redirection. Patient increasingly anxious. Dorann Stage np updated. Orders for Xanax placed. Will continue to monitor.

## 2020-12-16 NOTE — FLOWSHEET NOTE
Situation:  Writer received referral  from RN to visit Mr. Loly Ott. Assessment:  Mr. Loly Ott was watching TV. He acknowledged writer and turned down the volume of the TV. He responded to writer's questions. He shared that he was in the [employer] for 30 years. He smiled as he talked about his experience. He has two children (one who is living) and grandchildren. He was raised in the [employer] tradition and affirmed that he draws strength from his belief in God. Intervention:  Writer provided supportive presence and inquired about Pt's coping and needs; explored Pt's sources of meaning and support; affirmed Pt's strengths; offered words of encouragement and support. Outcome:  Mr. Loly Ott acknowledged writer's words of support and smiled. 12/16/20 [id number]   Encounter Summary   Services provided to: Patient   Referral/Consult From: Nurse   Support System Children   Continue Visiting   (12/16/20)   Complexity of Encounter Moderate   Length of Encounter 15 minutes   Spiritual Assessment Completed Yes   Routine   Type Initial   Spiritual/Christianity   Type Spiritual support   Assessment Calm; Approachable   Intervention Active listening;Explored feelings, thoughts, concerns;Explored coping resources;Sustaining presence/ Ministry of presence; Discussed relationship with God;Discussed belief system/Rastafari practices/randi   Outcome Expressed feelings/needs/concerns;Engaged in conversation;Expressed gratitude     Electronically signed by Radha Torres, Oncology Outpatient [facility], [address] [facility]  12/16/2020  6:35 PM

## 2020-12-16 NOTE — PLAN OF CARE
Problem: Skin Integrity:  Goal: Will show no infection signs and symptoms  Description: Will show no infection signs and symptoms  Outcome: Ongoing  Goal: Absence of new skin breakdown  Description: Absence of new skin breakdown  Outcome: Ongoing     Problem: Falls - Risk of:  Goal: Will remain free from falls  Description: Will remain free from falls  Outcome: Ongoing  Goal: Absence of physical injury  Description: Absence of physical injury  Outcome: Ongoing     Problem: Confusion - Acute:  Goal: Absence of continued neurological deterioration signs and symptoms  Description: Absence of continued neurological deterioration signs and symptoms  Outcome: Ongoing  Goal: Mental status will be restored to baseline  Description: Mental status will be restored to baseline  Outcome: Ongoing     Problem: Discharge Planning:  Goal: Ability to perform activities of daily living will improve  Description: Ability to perform activities of daily living will improve  Outcome: Ongoing  Goal: Participates in care planning  Description: Participates in care planning  Outcome: Ongoing     Problem: Injury - Risk of, Physical Injury:  Goal: Will remain free from falls  Description: Will remain free from falls  Outcome: Ongoing  Goal: Absence of physical injury  Description: Absence of physical injury  Outcome: Ongoing     Problem: Mood - Altered:  Goal: Mood stable  Description: Mood stable  Outcome: Ongoing  Goal: Absence of abusive behavior  Description: Absence of abusive behavior  Outcome: Ongoing  Goal: Verbalizations of feeling emotionally comfortable while being cared for will increase  Description: Verbalizations of feeling emotionally comfortable while being cared for will increase  Outcome: Ongoing     Problem: Psychomotor Activity - Altered:  Goal: Absence of psychomotor disturbance signs and symptoms  Description: Absence of psychomotor disturbance signs and symptoms  Outcome: Ongoing     Problem: Sensory Perception -

## 2020-12-16 NOTE — PROGRESS NOTES
[facility]  Office: [address], DO, Rimma Barrios, DO, Pierce Shown, DO, Leonela Remedies Blood, DO, Phillip Yap MD, Kike Palacio MD, Jim Oconnor MD, Margarita Salgado MD, Johanna Kumar MD, Danae Sarah MD, Homero Grullon MD, Lora Borja MD, Laendra Back MD, Alma Soriano, DO, Yoon Hennessy MD, Matilde Leal MD, Matilde Bennett, DO, Mally Dexter MD,  Mitch Nolasco, DO, Wu Glez MD, Erin Dee MD, Cha Bolden, Mirna Laws, MANUELA, Baldo Lim CNP, Krystina Blake, CNS, Fabian Leyva, CNP, Vipul Sheridan, CNP, Oswaldo Stokes, CNP, Darwin Mcdaniels, CNP, Liza Madden, CNP, Myriam Mejia PA-C, Cory [facility], Franca Cha, CNP, Esme Mdaison, CNP, Latisha Orosco, CNP, Barbie Raygoza, CNP, Bell Gomez, [id number]    Progress Note    12/16/2020    3:20 PM    Name:   Glenn Ch  MRN:     [id number]     Acct:      [de-identified]   Room:   [address] [place] [address] Day:  2  Admit Date:  12/14/2020  5:20 PM    PCP:   Lisa Gonsalves MD  Code Status:  Full Code    Subjective:     C/C:   Chief Complaint   Patient presents with    Abnormal Lab     possibly PTINR, nursing home told EMS the patient was bleeding out because of his labs, no complaints at this time    Abrasion     skin tears     Interval History Status: improved. Patient was able to go to [facility] today for a video swallow. Further recommendations the patient should be on a dental soft diet with mildly thick (nectar) liquids. Cup or straw are okay. They recommend the patient be upright 90 degrees for all meals. His respiratory symptoms have seemed to improve some but he is anxious and agitated often. He calls out to the nurses station to let them know he coughed. He continues to fidget with his telemetry, his oxygen tubing and pretty much anything gets his hands on. Earlier today received a dose of Xanax p.o. but did not help much. He just had a dose of Haldol IM about an hour ago. Brief History:     Per previous documentation  Alber Gamboa is a 82 y.o. Non-/non  male who presents with Abnormal Lab (possibly PTINR, nursing home told EMS the patient was bleeding out because of his labs, no complaints at this time) and Abrasion (skin tears)   and is admitted to the hospital for the management of Supratherapeutic INR.     During my assessment the patient really was not able to tell me what brought him to the hospital.  According to the ER note the nursing home he resides at called EMS reported they were concerned the patient was bleeding out because of his elevated INR. According to the ER notes the ER physician spoke to the patient's nursing home who told him his INR was around 9. His INR obtained here was > 14.8. He received vitamin K 10 mg IV while in the ER and admitted for further evaluation and work-up.      The INR obtained at 1 AM was 2.3 and then this morning 1.7. I have asked pharmacy to resume Coumadin dosing. CBC is stable. Nursing was concerned the patient may be aspirating with meals related to coughing/choking spells while eating. Chest x-ray completed shows bibasilar infiltrates concerning for pneumonia. The patient tells me he does not eat anything solid like sandwiches anymore. He is unable to give me a good history. He does report coughing and shortness of breath for at least a month. He also remembers falling and being in the hospital for a few days in November. He reports feeling constipated. He does not think he had a bowel movement in 4 days. But he also complains that the food at the facility is terrible or he just cannot eat it so that is why he has had a bowel movement.      During my assessment he does have mild rhonchi throughout with expiratory wheezing intermittently on the left and in right upper lobe.   Related to patient history of MRSA Zosyn and Cipro started prophylactically for pneumonia. Procalcitonin and sputum culture ordered. Continue to monitor for signs of hypoxia, respiratory distress or fever. Related to the patient's recent + urine culture will check urine culture and blood cultures prior to antibiotics. Supratherapeutic INR resolved. Elevation may be the result of current antibiotic use.      According to care everywhere the patient was admitted to [facility] from November 5 through the number 11th related to pneumonia of the right lower lobe, a fall, a contusion to his back, nontraumatic rhabdomyolysis and a closed nondisplaced fracture of the right patella. During that visit he was also diagnosed with a Klebsiella UTI. Keflex was completed on 11/11/2020. At the time of his discharge he was supposed to be nonweightbearing per Ortho. His white count at the time of discharge was 10.8. INR was 2.8. A chest x-ray completed during that visit also recommended a follow-up CT after completing antibiotics related to patchy infiltrates and/or atelectasis in the right lower lobe. MRSA PCR positive    Per Dr. Joelle Sethi he feels is reasonable to start with a short course of antibiotics including doxycycline to cover the MRSA. He believes the infiltrates may be chronic after repeated aspiration. Swallow study was completed today. Speech recommends dental soft diet with mildly thick (nectar) liquids. Cup or straw are okay. They recommend the patient be upright 90 degrees for all meals. Xanax p.o. started twice a day for agitation without much degree of improvement. Later received a dose of Haldol which seems to have helped. I discussed the case with Dr. Milton Mccord who thinks a CT of the head without contrast should be completed related to his admission diagnosis of supratherapeutic INR to rule out bleed. I also discontinued IV steroids and placed him on prednisone daily.       Review of Systems:     Review of Systems   Unable to perform ROS: Mental status change Medications: Allergies:  No Known Allergies    Current Meds:   Scheduled Meds:    warfarin  5 mg Oral Once    predniSONE  20 mg Oral Daily    warfarin (COUMADIN) daily dosing (placeholder)   Other RX Placeholder    piperacillin-tazobactam  3.375 g Intravenous Q8H    ipratropium-albuterol  1 ampule Inhalation Q4H While awake    Vitamin D  5 tablet Oral Daily    digoxin  125 mcg Oral Daily    dilTIAZem  120 mg Oral Daily    finasteride  5 mg Oral Daily    levothyroxine  75 mcg Oral Daily    tamsulosin  0.8 mg Oral Nightly    sodium chloride flush  10 mL Intravenous 2 times per day     Continuous Infusions:   PRN Meds: ALPRAZolam, sodium chloride flush, potassium chloride **OR** potassium alternative oral replacement **OR** potassium chloride, magnesium sulfate, promethazine **OR** ondansetron, polyethylene glycol, nicotine, acetaminophen **OR** acetaminophen    Data:     Past Medical History:   has a past medical history of Atherosclerosis, Benign prostatic hyperplasia, Hypertension, Hypothyroidism, Osteoporosis, Paroxysmal A-fib ([address] Pneumonia, Rhabdomyolysis, Smoking, Spondylolisthesis, and Vitamin D deficiency. Social History:   reports that he has never smoked. He has never used smokeless tobacco. He reports that he does not drink alcohol or use drugs. Family History: History reviewed. No pertinent family history. Vitals:  /61   Pulse 75   Temp 98.2 °F (36.8 °C) (Oral)   Resp 16   Ht 6' 3\" (1.905 m)   Wt 183 lb 6.4 oz (83.2 kg)   SpO2 97%   BMI 22.92 kg/m²   Temp (24hrs), Av.8 °F (36.6 °C), Min:97.2 °F (36.2 °C), Max:98.3 °F (36.8 °C)    No results for input(s): POCGLU in the last 72 hours. I/O (24Hr):     Intake/Output Summary (Last 24 hours) at 2020 1520  Last data filed at 2020 0630  Gross per 24 hour   Intake 220 ml   Output 475 ml   Net -255 ml       Labs:  Hematology:  Recent Labs     20  1725 12/15/20  0138 12/15/20  0559 20  1629 home oxygen therapy ([facility] [address] 12/15/2020 Yes    Pneumonia due to infectious organism 12/15/2020 Yes    Coagulopathy ([facility] [address] 12/15/2020 Yes          Plan:     CT head without contrast related to increased confusion and agitation. Patient was admitted with supratherapeutic INR    Continue gentle IV hydration    Bibasilar pneumonia; recent hospitalization and positive PCR for MRSA. Continue Zosyn add doxycycline p.o. Pulmonary consulted. Attempt to obtain sputum culture. Blood cultures and urine culture pending. DuoNeb. Chronic oxygen use at 2.5. Wean oxygen as needed    Solu-Medrol discontinued. Prednisone daily     Supratherapeutic INR resolved. Pharmacy consulted for Coumadin dosing denied history of A. Fib. Monitor INR daily    Continue home dig, Cardizem, Proscar, Synthroid, and Flomax     Diet per speech/video swallow recommendations.   Continue supplements and proper consistency    Xanax as needed for anxiety    Monitor and replace electrolytes as needed     PT/OT evaluate and treat    Kym Loss, APRN - CNP  12/16/2020  3:20 PM

## 2020-12-16 NOTE — PROGRESS NOTES
Physical Therapy  Facility/Department: [facility] SURG ICU  Daily Treatment Note  NAME: Veronica Peguero  : 1935  MRN: [id number]    Date of Service: 2020    Discharge Recommendations:  Patient would benefit from continued therapy after discharge   PT Equipment Recommendations  Equipment Needed: No    Assessment   Body structures, Functions, Activity limitations: Decreased functional mobility ; Decreased balance;Decreased strength;Decreased cognition;Decreased endurance;Decreased safe awareness  Assessment: Pt presents with decreased balance, functional mobility, endurance, and safety awareness secondary to patella fracture. Pt requires use of knee immobilizer at all times. Pt is impulsive requiring maximum verbal and tactile cues for initiation and safety awarenss. Pt performs bed mobilit tasks with Min Ax2, transfers with RW and Min to Mod A x2 from EOB. Pt performed therapeutic exercises this session without any complaints. Pt plans to return to [facility] for / physical assistance and would benefit from further therapy upon discharge. Prognosis: Good  Decision Making: Medium Complexity  PT Education: PT Role;Plan of Care; Functional Mobility Training;Transfer Training  Barriers to Learning: cognition  REQUIRES PT FOLLOW UP: Yes  Activity Tolerance  Activity Tolerance: Patient limited by endurance; Patient limited by fatigue;Patient limited by cognitive status     Patient Diagnosis(es): The primary encounter diagnosis was Coagulopathy ([facility] Diagnoses of Elevated INR and Leukocytosis, unspecified type were also pertinent to this visit. has a past medical history of Atherosclerosis, Benign prostatic hyperplasia, Hypertension, Hypothyroidism, Osteoporosis, Paroxysmal A-fib ([address] Pneumonia, Rhabdomyolysis, Smoking, Spondylolisthesis, and Vitamin D deficiency.    has a past surgical history that includes Ankle surgery; hip surgery; and Tonsillectomy and perform and needs moderate verbal cues for safety and initiation. Transfers  Sit to Stand: Minimal Assistance; Moderate Assistance;2 Person Assistance  Stand to sit: Minimal Assistance; Moderate Assistance;2 Person Assistance  Comment: Pt performed sit <> stand from EOB x2 requiring moderate assistance x2 for first transfer, second transfer required minimal assistance. Pt is impulsive and requires maximum verbal and tactile cues for hand placement as well as for initiation and safety awareness. Ambulation  Ambulation?: No  More Ambulation?: No     Balance  Posture: Fair  Sitting - Static: Fair;+  Sitting - Dynamic: Fair;-  Standing - Static: Poor;+  Standing - Dynamic: Poor;-  Comments: Pt performed static standing with RW for ~1-2 minutes x2 before requiring a seated rest break; pt performed static sitting from EOB for ~5 minutes. Exercises  Straight Leg Raise: 10 reps x1 BLE  Gluteal Sets: 10 reps x1  Hip Abduction: 10 reps x1 BLE(supine)  Knee Active Range of Motion: 10 reps x1 LLE supine heel slides  Ankle Pumps: 10 reps x1 BLE      Goals  Short term goals  Time Frame for Short term goals: 15  Short term goal 1: Pt will ambulate 150ft with modified independence using a RW in order to navigate within his home. Short term goal 2: Pt will tolerate 45 minutes of therapy to improve his endurance. Short term goal 3: Pt will transfer sit <> stand with modified independence using a RW to get off the toilet safely. Short term goal 4: Pt will transfer supine <> sit with modified independence using a bed rail to get out of bed safely. Short term goal 5: Pt will improve his dynamic standing balance to Good (-) to decrease his fall risk. Patient Goals   Patient goals :  To return to his [address]  Times per week: 5-6x  Times per day: Daily  Current Treatment Recommendations: Strengthening, Home Exercise Program, Endurance Training, Balance Training, Gait Training, Transfer Training, Functional Mobility Training  Safety Devices  Type of devices: Bed alarm in place, Call light within reach, Nurse notified, Gait belt, Patient at risk for falls, Left in bed  Restraints  Initially in place: No     Therapy Time   Individual Concurrent Group Co-treatment   Time In 0820         Time Out 0856         Minutes 36         Timed Code Treatment Minutes: [address] with OT. Nevaeh Orozco, SPT  Evaluation/treatment performed by Student PT under the supervision of co-signing PT who agrees with all evaluation/treatment and documentation.

## 2020-12-17 NOTE — PLAN OF CARE
Problem: Skin Integrity:  Goal: Will show no infection signs and symptoms  Description: Will show no infection signs and symptoms  12/17/2020 0354 by Adali Krishna RN  Outcome: Ongoing   Mepilex's on skin tears. Patient able to turn self and hob self regulated. Problem: Falls - Risk of:  Goal: Will remain free from falls  Description: Will remain free from falls  12/17/2020 0354 by Adali Krishna RN  Outcome: Ongoing   Pt assessed as a fall risk this shift. Remains free from falls and accidental injury at this time. Fall precautions in place, including falling star sign and fall risk band on pt. Floor free from obstacles, and bed is locked and in lowest position. Adequate lighting provided. Pt encouraged to call before getting OOB for any need. Bed alarm activated. Will continue to monitor needs during hourly rounding, and reinforce education on use of call light. Problem: Confusion - Acute:  Goal: Absence of continued neurological deterioration signs and symptoms  Description: Absence of continued neurological deterioration signs and symptoms  12/17/2020 0354 by Adali Krishna RN  Outcome: Ongoing   Patient oriented to self. Will continue to monitor. Problem: Discharge Planning:  Goal: Ability to perform activities of daily living will improve  Description: Ability to perform activities of daily living will improve  12/17/2020 0354 by Adali Krishna RN  Outcome: Ongoing     Problem: Injury - Risk of, Physical Injury:  Goal: Will remain free from falls  Description: Will remain free from falls  12/17/2020 0354 by Adali Krishna RN  Outcome: Ongoing     Problem: Mood - Altered:  Goal: Mood stable  Description: Mood stable  12/17/2020 0354 by Adali Krishna RN  Outcome: Ongoing   Patient agitated at beginning of shift. Patient then became calm and cooperative. Will continue to monitor.      Problem: Psychomotor Activity - Altered:  Goal: Absence of psychomotor disturbance signs and symptoms  Description: Absence of psychomotor disturbance signs and symptoms  12/17/2020 0354 by Iftikhar Billy RN  Outcome: Ongoing     Problem: Sensory Perception - Impaired:  Goal: Demonstrations of improved sensory functioning will increase  Description: Demonstrations of improved sensory functioning will increase  12/17/2020 0354 by Iftikhar Billy RN  Outcome: Ongoing     Problem: Sleep Pattern Disturbance:  Goal: Appears well-rested  Description: Appears well-rested  12/17/2020 0354 by Iftikhar Billy RN  Outcome: Ongoing   Patient sleeping well this shift. Problem: Nutrition  Goal: Optimal nutrition therapy  Description: Nutrition Problem #1: Increased nutrient needs  Intervention: Food and/or Nutrient Delivery: Continue Current Diet, Start Oral Nutrition Supplement  Nutritional Goals: PO intakes to meet >75% estimated needs. 12/17/2020 0354 by Iftikhar Billy RN  Outcome: Ongoing     Problem: Musculor/Skeletal Functional Status  Goal: Highest potential functional level  12/17/2020 0354 by Iftikhar Billy RN  Outcome: Ongoing     Problem: Respiratory:  Goal: Ability to maintain a clear airway will improve  Description: Ability to maintain a clear airway will improve  Outcome: Ongoing   Patient lung sounds have crackles and rhonchi. Will continue to monitor.

## 2020-12-17 NOTE — CARE COORDINATION
Call from Connie at [facility], patient insurance changes to [employer] on January 1st, if patient is not d/c ready, patient will need to be transferred to a new SNF or cash pay as they are not in network. Updated daughter, requested that writer update patient, also. Updated patient and asked if there were alternate facility that he would like to try, he declined. Call to Altagracia Mcleod at [facility], updated with likely return to SNF tomorrow.  Will follow    TRANSITIONAL CARE PLANNING/ 2 Rehab Ming Day: 3    Reason for Admission: Supratherapeutic INR [R79.1]     Treatment Plan of Care: normalize INR, aspiration prevention    Tests/Procedures still needed:     Barriers to Discharge: increased O2 needs overnight, NT sx    Readmission Risk           Risk of Unplanned Readmission:        13      Patient goals/Treatment Preferences/Transitional Plan: return to SNF    Referrals Made: [facility], [facility]    Follow Up needed:

## 2020-12-17 NOTE — PROGRESS NOTES
Physician Progress Note      Marlene Aguilar  CSN #:                  [id number]  :                       1935  ADMIT DATE:       2020 5:20 PM  DISCH DATE:  RESPONDING  PROVIDER #:        Donna You MD          QUERY TEXT:    Pt admitted with Pneumonia and Supratherapeutic INR. Pt noted to have SIRS   symptoms. If possible, please document in the progress notes and discharge   summary if you are evaluating and /or treating any of the following: The medical record reflects the following:  Risk Factors: lives in SNF, chronic resp failure  Clinical Indicators: WBC  17.8- 15.0,  HR , tachypnea, increased INR,   recent klebsiella UTI  Treatment: Zosyn, CXR, urine cx  Options provided:  -- Sepsis, present on admission  -- Sepsis due to pneumonia,  present on admission  -- No Sepsis, Pneumonia only  -- Sepsis was ruled out  -- Other - I will add my own diagnosis  -- Disagree - Not applicable / Not valid  -- Disagree - Clinically unable to determine / Unknown  -- Refer to Clinical Documentation Reviewer    PROVIDER RESPONSE TEXT:    This patient has Sepsis due to pneumonia present on admission.     Query created by: Bam Barrios on 12/15/2020 1:44 PM      Electronically signed by:  Donna You MD 2020 10:59 AM

## 2020-12-17 NOTE — PROGRESS NOTES
[facility]  Office: [address], DO, Martin Madrigal, DO, Yuko Palafox, DO, Tano Oviedo, DO, Jeff Sewell MD, Tien Zuniga MD, Margi Reed MD, Ruth Bustos MD, Glenn Nath MD, Kim Osei MD, Kevin New MD, Mariah Lara MD, Leandra Mooney MD, Herman Pike DO, Mary Castellon MD, Charlene Kan MD, Angeline Chester DO, Fidelina Bustos MD,  Joby Mcallister DO, Gayathri Addison MD, Mehdi Alejandre MD, Nasir Prather, [facility], [facility], CNP, Michael Liu, CNP, Juan Saunders, CNS, Amanda Short, CNP, Claude Leep, CNP, Nelly Mercado, CNP, Myriam Dudley, CNP, Jacobo Vazquez, CNP, Matt Sanchez PA-C, Fer Lott, [facility], Ludmila Jasso, CNP, Kaye Vega, CNP, Lorna Booker, CNP, Elsa Mcgraw, CNP, [address]    Progress Note    12/17/2020    3:33 PM    Name:   Ashwin Cross  MRN:     [id number]     Acct:      [de-identified]   Room:   [address] [place] [address] Day:  3  Admit Date:  12/14/2020  5:20 PM    PCP:   Rico Blake MD  Code Status:  Full Code    Subjective:     C/C:   Chief Complaint   Patient presents with    Abnormal Lab     possibly PTINR, nursing home told EMS the patient was bleeding out because of his labs, no complaints at this time    Abrasion     skin tears     Interval History Status: improved. He remains confused but he is calling out a little less today. He is unable to answer my orientation questions. He was OOB to chair but asked multiple people to help him back to bed after a very short time. Staff did encourage him to stay up after lunch and he ultimately did. He has been resting on/off today. He doesn't seem to be in any respiratory distress. He continues to have rhonchi with fine scattered crackles. Lasix po given. CXR shows mild improvement. Nursing notes indicate he was c/o urinary retention but that hasn't been the case today.      Brief History:     Per previous documentation  Bishop Guillen is a 82 y.o. Non-/non  male who presents with Abnormal Lab (possibly PTINR, nursing home told EMS the patient was bleeding out because of his labs, no complaints at this time) and Abrasion (skin tears)   and is admitted to the hospital for the management of Supratherapeutic INR.     During my assessment the patient really was not able to tell me what brought him to the hospital.  According to the ER note the nursing home he resides at called EMS reported they were concerned the patient was bleeding out because of his elevated INR. According to the ER notes the ER physician spoke to the patient's nursing home who told him his INR was around 9. His INR obtained here was > 14.8. He received vitamin K 10 mg IV while in the ER and admitted for further evaluation and work-up.      The INR obtained at 1 AM was 2.3 and then this morning 1.7. I have asked pharmacy to resume Coumadin dosing. CBC is stable. Nursing was concerned the patient may be aspirating with meals related to coughing/choking spells while eating. Chest x-ray completed shows bibasilar infiltrates concerning for pneumonia. The patient tells me he does not eat anything solid like sandwiches anymore. He is unable to give me a good history. He does report coughing and shortness of breath for at least a month. He also remembers falling and being in the hospital for a few days in November. He reports feeling constipated. He does not think he had a bowel movement in 4 days. But he also complains that the food at the facility is terrible or he just cannot eat it so that is why he has had a bowel movement.      During my assessment he does have mild rhonchi throughout with expiratory wheezing intermittently on the left and in right upper lobe. Related to patient history of MRSA Zosyn and Cipro started prophylactically for pneumonia. Procalcitonin and sputum culture ordered.   Continue to monitor for signs of hypoxia, respiratory distress or fever. Related to the patient's recent + urine culture will check urine culture and blood cultures prior to antibiotics. Supratherapeutic INR resolved. Elevation may be the result of current antibiotic use.      According to care everywhere the patient was admitted to [facility] from November 5 through the number 11th related to pneumonia of the right lower lobe, a fall, a contusion to his back, nontraumatic rhabdomyolysis and a closed nondisplaced fracture of the right patella. During that visit he was also diagnosed with a Klebsiella UTI. Keflex was completed on 11/11/2020. At the time of his discharge he was supposed to be nonweightbearing per Ortho. His white count at the time of discharge was 10.8. INR was 2.8. A chest x-ray completed during that visit also recommended a follow-up CT after completing antibiotics related to patchy infiltrates and/or atelectasis in the right lower lobe. MRSA PCR positive    Per Dr. Rafaela Rivera he feels is reasonable to start with a short course of antibiotics including doxycycline to cover the MRSA. He believes the infiltrates may be chronic after repeated aspiration. Swallow study was completed today. Speech recommends dental soft diet with mildly thick (nectar) liquids. Cup or straw are okay. They recommend the patient be upright 90 degrees for all meals. Xanax p.o. started twice a day for agitation without much degree of improvement. Later received a dose of Haldol which seems to have helped. I discussed the case with Dr. Maryam Logan who thinks a CT of the head without contrast should be completed related to his admission diagnosis of supratherapeutic INR to rule out bleed. I also discontinued IV steroids and placed him on prednisone daily. Lasix 20 mg po given today. Seems less agitated today. Tolerating po intake.        Review of Systems:     Review of Systems   Unable to perform ROS: Mental status change       Medications: Allergies:  No Known Allergies    Current Meds:   Scheduled Meds:    warfarin  5 mg Oral Once    ipratropium-albuterol  1 ampule Inhalation 4x daily    predniSONE  20 mg Oral Daily    doxycycline hyclate  100 mg Oral 2 times per day    haloperidol lactate  2 mg Intramuscular Once    warfarin (COUMADIN) daily dosing (placeholder)   Other RX Placeholder    piperacillin-tazobactam  3.375 g Intravenous Q8H    Vitamin D  5 tablet Oral Daily    digoxin  125 mcg Oral Daily    dilTIAZem  120 mg Oral Daily    finasteride  5 mg Oral Daily    levothyroxine  75 mcg Oral Daily    tamsulosin  0.8 mg Oral Nightly    sodium chloride flush  10 mL Intravenous 2 times per day     Continuous Infusions:   PRN Meds: ALPRAZolam, sodium chloride flush, potassium chloride **OR** potassium alternative oral replacement **OR** potassium chloride, magnesium sulfate, promethazine **OR** ondansetron, polyethylene glycol, nicotine, acetaminophen **OR** acetaminophen    Data:     Past Medical History:   has a past medical history of Atherosclerosis, Benign prostatic hyperplasia, Hypertension, Hypothyroidism, Osteoporosis, Paroxysmal A-fib ([address] Pneumonia, Rhabdomyolysis, Smoking, Spondylolisthesis, and Vitamin D deficiency. Social History:   reports that he has never smoked. He has never used smokeless tobacco. He reports that he does not drink alcohol or use drugs. Family History: History reviewed. No pertinent family history. Vitals:  BP (!) 88/52   Pulse 92   Temp 97.8 °F (36.6 °C) (Oral)   Resp 24   Ht 6' 3\" (1.905 m)   Wt 185 lb 8 oz (84.1 kg)   SpO2 (!) 87%   BMI 23.19 kg/m²   Temp (24hrs), Av.7 °F (36.5 °C), Min:97.4 °F (36.3 °C), Max:98.2 °F (36.8 °C)    No results for input(s): POCGLU in the last 72 hours. I/O (24Hr):     Intake/Output Summary (Last 24 hours) at 2020 1533  Last data filed at 2020 1300  Gross per 24 hour   Intake 2240 ml   Output 861 ml   Net 1379 ml Labs:  Hematology:  Recent Labs     12/15/20  0559 12/16/20  0552 12/17/20  0532   WBC 15.0* 16.4* 16.7*   RBC 3.57* 3.19* 3.14*   HGB 10.3* 9.1* 9.0*   HCT 32.0* 29.3* 28.0*   MCV 89.5 91.8 89.1   MCH 28.8 28.5 28.8   MCHC 32.1 31.1 32.3   RDW 18.6* 18.9* 18.7*    395 464*   MPV 7.4 7.8 7.6   INR 1.7 1.3 1.3     Chemistry:  Recent Labs     12/14/20  1725 12/15/20  0559 12/16/20  0552 12/17/20  0532    139 136 136   K 3.5* 3.4* 3.4* 4.1   CL 98 102 99 100   CO2 29 29 28 28   GLUCOSE 128* 116* 146* 133*   BUN 22 16 20 23   CREATININE 0.96 0.74 0.94 0.82   MG 2.2 2.2  --   --    ANIONGAP 11 8* 9 8*   LABGLOM >60 >60 >60 >60   GFRAA >60 >60 >60 >60   CALCIUM 9.7 9.3 9.2 9.5   DIGOXIN  --  0.8  --   --      Recent Labs     12/14/20  1725   PROT 6.6   LABALBU 3.1*   AST 27   ALT 12   ALKPHOS 106   BILITOT 0.78     ABG:No results found for: POCPH, PHART, PH, POCPCO2, DQH4FPT, PCO2, POCPO2, PO2ART, PO2, POCHCO3, GOG9IAY, HCO3, NBEA, PBEA, BEART, BE, THGBART, THB, FNG0KRS, ZYIR0WPO, C2YTDDYX, O2SAT, FIO2  Lab Results   Component Value Date/Time    SPECIAL RIGHT ARM 3ML 12/15/2020 01:30 PM     Lab Results   Component Value Date/Time    CULTURE (A) 12/15/2020 01:30 PM     POSITIVE Blood Culture Results called to and read back by: BRANDO POSADA AT 16:15 ON 12/16/20    CULTURE  12/15/2020 01:30 PM     DIRECT GRAM STAIN FROM BOTTLE: GRAM POSITIVE COCCI IN CLUSTERS    CULTURE (A) 12/15/2020 01:30 PM     Detected:  Coagulase negative Staphylococcus species (not  S.epidermidis, or S. ugdunensis)  Culture Results to Follow  Methodology- Polymerase Chain Reaction (PCR)      CULTURE (A) 12/15/2020 01:30 PM     STAPHYLOCOCCUS SPECIES, COAGULASE NEGATIVE A single positive blood culture of coagulase negative Staphylocci, diptheroids, micrococci, Cutibacterium, viridans Streptocci, Bacillus, or Lactobacillus species should be interpreted with caution and viewed as a likely skin contaminant.        Radiology:  Xr Chest Portable    Result Date: 12/15/2020  Bibasilar infiltrates concerning for pneumonia. Fl Modified Barium Swallow W Video    Result Date: 12/16/2020  No evidence of aspiration. Penetration with thin liquid consistency Please see separate speech pathology report for full discussion of findings and recommendations. Physical Examination:     Physical Exam  Vitals signs and nursing note reviewed. Constitutional:       General: He is awake. Appearance: He is cachectic. HENT:      Mouth/Throat:      Mouth: Mucous membranes are dry. Eyes:      Extraocular Movements: Extraocular movements intact. Conjunctiva/sclera: Conjunctivae normal.   Cardiovascular:      Rate and Rhythm: Normal rate and regular rhythm. Pulses: Normal pulses. Heart sounds: Normal heart sounds. Pulmonary:      Effort: Pulmonary effort is normal.      Breath sounds: Normal air entry. Examination of the right-upper field reveals rhonchi. Examination of the left-upper field reveals rhonchi. Examination of the right-middle field reveals rhonchi and rales. Examination of the left-middle field reveals rhonchi and rales. Examination of the right-lower field reveals rhonchi and rales. Examination of the left-lower field reveals rhonchi and rales. Rhonchi and rales present. Abdominal:      General: Bowel sounds are normal.      Palpations: Abdomen is soft. Musculoskeletal: Normal range of motion. Right lower leg: No edema. Left lower leg: No edema. Skin:     General: Skin is warm and dry. Capillary Refill: Capillary refill takes less than 2 seconds. Comments: No change in previous skin assessment. Neurological:      General: No focal deficit present. GCS: GCS eye subscore is 4. GCS verbal subscore is 4. GCS motor subscore is 6. Motor: Motor function is intact. Psychiatric:         Attention and Perception: He is inattentive. Mood and Affect: Affect is labile.          Speech: Speech normal.         Cognition and Memory: Cognition is impaired. Memory is impaired. He exhibits impaired recent memory and impaired remote memory. Comments: Less anxious today but he does get fixated on his cords and figits a lot. Assessment:     Hospital Problems           Last Modified POA    * (Principal) Elevated INR 12/15/2020 Yes    Overview Signed 12/15/2020  7:12 AM by VAIBHAV Apodaca CNP     11/6/2020         Age-related osteoporosis with current pathological fracture (Chronic) 12/15/2020 Yes    Hypertension 12/15/2020 Yes    Hypothyroidism 12/15/2020 Yes    Benign prostatic hyperplasia 12/15/2020 Yes    Paroxysmal atrial fibrillation ([address] 12/15/2020 Yes    Vitamin D deficiency 12/15/2020 Yes    Chronic respiratory failure with hypoxia, on home oxygen therapy ([facility] 12/15/2020 Yes    Pneumonia due to infectious organism 12/15/2020 Yes    Coagulopathy ([facility] [address] [facility] 12/15/2020 Yes          Plan:     CT head without contrast related to increased confusion and agitation. Patient was admitted with supratherapeutic INR    IVF stopped. Lasix 20 mg po today    Bibasilar pneumonia; recent hospitalization and positive PCR for MRSA. Continue Zosyn add doxycycline p.o. Pulmonary consulted. Attempt to obtain sputum culture. DuoNeb. Chronic oxygen use at 2.5. Wean oxygen as needed    Solu-Medrol discontinued. Prednisone daily     Supratherapeutic INR resolved. Pharmacy consulted for Coumadin dosing denied history of A. Fib. Monitor INR daily. Lovenox times today while restarting/adjusting coumadin  Continue home dig, Cardizem, Proscar, Synthroid, and Flomax     Diet per speech/video swallow recommendations.   Continue supplements and proper consistency    Xanax as needed for anxiety    Monitor and replace electrolytes as needed     PT/OT evaluate and treat    VAIBHAV Apodaca CNP  12/17/2020  3:33 PM

## 2020-12-17 NOTE — PLAN OF CARE
Problem: Skin Integrity:  Goal: Will show no infection signs and symptoms  Description: Will show no infection signs and symptoms  12/17/2020 1748 by Ame Costa RN  Outcome: Ongoing  12/17/2020 0354 by Alie Pedraza RN  Outcome: Ongoing  Goal: Absence of new skin breakdown  Description: Absence of new skin breakdown  12/17/2020 1748 by Ame Costa RN  Outcome: Ongoing  12/17/2020 0354 by Alie Pedraza RN  Outcome: Ongoing     Problem: Falls - Risk of:  Goal: Will remain free from falls  Description: Will remain free from falls  12/17/2020 1748 by Ame Costa RN  Outcome: Ongoing  12/17/2020 0354 by Alie Pedraza RN  Outcome: Ongoing  Goal: Absence of physical injury  Description: Absence of physical injury  12/17/2020 1748 by Ame Costa RN  Outcome: Ongoing  12/17/2020 0354 by lAie Pedraza RN  Outcome: Ongoing

## 2020-12-17 NOTE — PLAN OF CARE
RT in to see pt regarding Aerosol Therapy . MP tx administered , Acapella as well as Incentive Spirometry was utilized following aerosol .  Encouragement given , pt has good effort ,but not able to achieve target goal . 02 sat on 3lpm 92%

## 2020-12-17 NOTE — PROGRESS NOTES
Occupational Therapy  Facility/Department: [facility] ICU  Daily Treatment Note  NAME: Glenn Ch  : 1935  MRN: [id number]    Date of Service: 2020    Discharge Recommendations:  Patient would benefit from continued therapy after discharge       Assessment   Performance deficits / Impairments: Decreased functional mobility ; Decreased ADL status; Decreased safe awareness;Decreased balance;Decreased endurance;Decreased strength;Decreased coordination;Decreased fine motor control;Decreased cognition  Prognosis: Good;Fair  Decision Making: Medium Complexity  OT Education: OT Role;Plan of Care;Precautions; ADL Adaptive Strategies;Transfer Training;Energy Conservation;Equipment  REQUIRES OT FOLLOW UP: Yes  Activity Tolerance  Activity Tolerance: Patient limited by fatigue  Safety Devices  Safety Devices in place: Yes  Type of devices: Call light within reach;Nurse notified; Left in bed;Bed alarm in place  Restraints  Initially in place: No         Patient Diagnosis(es): The primary encounter diagnosis was Coagulopathy ([facility] [address] Diagnoses of Elevated INR and Leukocytosis, unspecified type were also pertinent to this visit. has a past medical history of Atherosclerosis, Benign prostatic hyperplasia, Hypertension, Hypothyroidism, Osteoporosis, Paroxysmal A-fib ([address] Pneumonia, Rhabdomyolysis, Smoking, Spondylolisthesis, and Vitamin D deficiency. has a past surgical history that includes Ankle surgery; hip surgery; and Tonsillectomy and adenoidectomy. Restrictions  Restrictions/Precautions  Restrictions/Precautions: Weight Bearing, Fall Risk  Required Braces or Orthoses?: Yes  Lower Extremity Weight Bearing Restrictions  Right Lower Extremity Weight Bearing: Weight Bearing As Tolerated  Required Braces or Orthoses  Right Lower Extremity Brace: Knee Immobilizer  Position Activity Restriction  Other position/activity restrictions:  Up with assistance; 3L O2 in place via nasal cannula     Subjective General  Patient assessed for rehabilitation services?: Yes  Response to previous treatment: Patient reporting fatigue but able to participate  Family / Caregiver Present: No  General Comment  Comments: RN ok'd for therapy this PM. Pt agreeable to participate in session and pleasant/cooperative throughout. Orientation  Orientation  Overall Orientation Status: Within Functional Limits     Objective    ADL  UE Dressing: Increased time to complete;Minimal assistance(seated EOB to doff/don hospital gown)  LE Dressing: Increased time to complete; Moderate assistance(seated at edge of recliner chair to don shoes, seated EOB to doff shoes)  Toileting: Increased time to complete;Maximum assistance(for brief mngt, pericare/bottom hygiene; standing EOB with use of RW)           Balance  Sitting Balance: Contact guard assistance  Standing Balance: Moderate assistance  Standing Balance  Time: ~2 minutes, ~1 minute, ~1 minute  Activity: functional mobility from chair > bed; static standing at EOB 2x during brief mngt/pericare/bottom hygiene  Comment: min A-mod A x2 with use of RW required throughout; pt significantly unsteady and impulsive requiring mod verbal cues/tactile cues for sequencing/safety awareness. Pt reporting fatigue and with significant labored breathing following minimal exertion requiring 2x prolonged seated rest break throughout activity. Bed mobility  Supine to Sit: (pt up to chair at therapist arrival)  Sit to Supine: Moderate assistance;2 Person assistance(assist x1 for trunk progression, assist x1 for BLE)  Scooting: Moderate assistance     Transfers  Sit to stand: Moderate assistance;2 Person assistance  Stand to sit: Moderate assistance;2 Person assistance  Transfer Comments: Pt signficantly impulsive and unsteady during all functional transfers this date with use of RW requiring mod verbal/tactile cues for proper hand placement/sequencing/safety awareness throughout. Cognition  Overall Cognitive Status: Exceptions  Arousal/Alertness: Appropriate responses to stimuli  Following Commands: Follows one step commands with repetition; Follows one step commands with increased time  Attention Span: Attends with cues to redirect; Difficulty attending to directions  Memory: Decreased recall of biographical Information  Safety Judgement: Decreased awareness of need for assistance;Decreased awareness of need for safety  Problem Solving: Assistance required to generate solutions;Assistance required to implement solutions;Decreased awareness of errors  Insights: Decreased awareness of deficits  Initiation: Requires cues for some  Sequencing: Requires cues for some         Plan   Plan  Times per week: 5-6x/wk  Times per day: Daily  Current Treatment Recommendations: Strengthening, Balance Training, Functional Mobility Training, Endurance Training, Patient/Caregiver Education & Training, Equipment Evaluation, Education, & procurement, Self-Care / ADL, Home Management Training, Safety Education & Training      Goals  Short term goals  Time Frame for Short term goals: 14 visits  Short term goal 1: Pt will perform functional transfers/functional mobility with CGA and use of RW  Short term goal 2: Pt will independently demo good safety awareness during engagement in ADLs and functional transfers/functional mobility  Short term goal 3: Pt will independently incorporate energy conservation techniques as needed during engagement in ADLs and functional transfers/functional mobility  Short term goal 4: Pt will demo 8+ minutes standing tolerance with use of RW for increased participation in ADLs  Short term goal 5: Pt will perform feeding/grooming/UB ADL tasks with mod IND including setup and use of AE/DME PRN  Short term goal 6: Pt will perform LB ADL tasks/toileting tasks with min A including setup and use of AE/DME PRN       Therapy Time   Individual Concurrent Group Co-treatment   Time In 7638 Time Out 1323         Minutes 28         Timed Code Treatment Minutes: Marycarmen 1833, OTR/L

## 2020-12-17 NOTE — PROGRESS NOTES
RN asked RT to check pt pulse ox , apparently he had a low sat 86% , We increased the 02 to 4lpm , pt appears confused and tries to push staff away when attempting to assess him , the NP on duty will be paged.

## 2020-12-17 NOTE — PROGRESS NOTES
RT and Rn in to assess pt and check as to whether there is a need to apply a Venti-mask . The head of bed was elevated and pt was cooperative enough for us to check a sat . WE were able to get a reading of 92% and will hold off for now with the mask . The pt is a mouth breather , will continue to monitor.

## 2020-12-17 NOTE — DISCHARGE INSTR - COC
Continuity of Care Form    Patient Name: Daniela Arzola   :  1935  MRN:  [id number]    Admit date:  2020  Discharge date:  2020    Code Status Order: Full Code   Advance Directives:   [facility] Documentation       Date/Time Healthcare Directive Type of Healthcare Directive Copy in [facility] [address] Agent's Name Healthcare Agent's Phone Number    12/15/20 0135  Unknown, patient unable to respond due to medical condition -- -- -- -- --            Admitting Physician:  Roshni Mccord MD  PCP: Cynthia Benavidez MD    Discharging Nurse: [facility] Unit/Room#: 124/491-27  Discharging Unit Phone Number: [phone]    Emergency Contact:   Extended Emergency Contact Information  Primary Emergency Contact: mariam claudio  Home Phone: [phone]  Relation: Child    Past Surgical History:  Past Surgical History:   Procedure Laterality Date    [address]         Immunization History: There is no immunization history on file for this patient.     Active Problems:  Patient Active Problem List   Diagnosis Code    Elevated INR R79.1    Age-related osteoporosis with current pathological fracture M80.00XA    Atherosclerotic peripheral vascular disease (HCC) I70.209    Closed nondisplaced longitudinal fracture of right patella S82.024A    Pathological compression fracture of lumbar vertebra (HCC) M48.56XA    Hypertension I10    Hypothyroidism E03.9    Benign prostatic hyperplasia N40.0    Paroxysmal atrial fibrillation (HCC) I48.0    Vitamin D deficiency E55.9    Chronic respiratory failure with hypoxia, on home oxygen therapy (HCC) J96.11, Z99.81    Pneumonia due to infectious organism J18.9    Coagulopathy ([facility] [address] D68.9       Isolation/Infection:   Isolation            No Isolation          Unreconciled Outside Infections       Enable clinical decision support by reconciling outside information with the 0304   Dressing/Treatment Foam 12/17/20 0304   Drainage Amount Small 12/17/20 0304   Drainage Description Serosanguinous 12/17/20 0304   Wound Thickness Description not for Pressure Injury Full thickness 12/17/20 0304   Number of days: 2        Elimination:  Continence: Bowel: No  Bladder: No  Urinary Catheter: None   Colostomy/Ileostomy/Ileal Conduit: No       Date of Last BM: 12/18/2020    Intake/Output Summary (Last 24 hours) at 12/17/2020 1000  Last data filed at 12/17/2020 0944  Gross per 24 hour   Intake 2240 ml   Output 861 ml   Net 1379 ml     I/O last 3 completed shifts: In: 1100 [P.O.:1100]  Out: 1111 [Urine:1111]    Safety Concerns:     Sund[employer] [place], History of Falls (last 30 days), At Risk for Falls and Aspiration Risk    Impairments/Disabilities:      Speech and Hearing    Nutrition Therapy:  Current Nutrition Therapy:   - Oral Diet:  Dental soft diet with Mildly thick (nectar) liquids. Routes of Feeding: Oral  Liquids: Nectar Thick Liquids  Daily Fluid Restriction: no  Last Modified Barium Swallow with Video (Video Swallowing Test): done on 12/16/2020    Treatments at the Time of Hospital Discharge:   Respiratory Treatments: nebulizers 4x day and as needed  Oxygen Therapy:  is on oxygen at 4 L/min per nasal cannula.   Ventilator:    - No ventilator support    Rehab Therapies: Physical Therapy and Occupational Therapy  Weight Bearing Status/Restrictions: No weight bearing restirctions  Other Medical Equipment (for information only, NOT a DME order):  walker and braces R knee immobilizer  Other Treatments:     Patient's personal belongings (please select all that are sent with patient):  Glasses, Dentures upper    RN SIGNATURE:  Electronically signed by Hamzah Matthews RN on 12/18/20 at 3:02 PM EST    CASE MANAGEMENT/SOCIAL WORK SECTION    Inpatient Status Date: 12/14/2020    Readmission Risk Assessment Score:  Readmission Risk              Risk of Unplanned Readmission:        13 Discharging to Facility/ Agency   Name: Solomon Spangler  Address:  Phone: [phone]  Fax:    Dialysis Facility (if applicable)   Name:  Address:  Dialysis Schedule:  Phone:  Fax:    / signature: Electronically signed by Sheila Malone RN on 12/18/20 at 10:05 AM EST    PHYSICIAN SECTION    Prognosis: Fair    Condition at Discharge: Stable    Rehab Potential (if transferring to Rehab): Fair    Recommended Labs or Other Treatments After Discharge: Coumadin 2 mg Friday ( Friday dose prior to discharge from hospital) 12/19 and 12/20 INR Monday 40/51/3503    Physician Certification: I certify the above information and transfer of Van Salazar  is necessary for the continuing treatment of the diagnosis listed and that he requires [facility] for greater 30 days.      Update Admission H&P: No change in H&P    PHYSICIAN SIGNATURE: Leandra Mendez MD

## 2020-12-17 NOTE — PROGRESS NOTES
Lab and RN went into patient's room to get blood drawn and for RN to assess. Patient refusing assessment and blood draw at this time and states that he just wants to sleep and refusing all lab draws and assessments again at this time. Patient refusing medications as well. Awaiting Mariangel Champagne CNP's phone call.

## 2020-12-17 NOTE — PROGRESS NOTES
RN came in to patient room to assess patient. Patient refusing to be assessed or have full set of vitals at this time. Patient stated he will let me assess him at 2200. Mili Bennett CNP, notified. Will continue to monitor.

## 2020-12-17 NOTE — PROGRESS NOTES
Pharmacy Note  Warfarin Consult follow-up      Recent Labs     12/17/20  0532   INR 1.3     Recent Labs     12/15/20  0559 12/16/20  0552 12/17/20  0532   HGB 10.3* 9.1* 9.0*   HCT 32.0* 29.3* 28.0*    395 464*       Significant Drug-Drug Interactions:  warfarin drug-drug interactions: APAP, levothyroxine, zosyn  Discontinued drug-drug interactions: solu-medrol      Notes: Solu-medrol d/c. Patient INR stable at 1.3 - remaining sub-therapeutic. Will give 5mg this evening as we look for INR to start rebounding tomorrow. Will discuss patient on rounds to see if patient has been active or would require a 1x dose of lovenox until INR goes back within therapeutic range. Daily PT/INR while inpatient.       Jane Fitzpatrick, PharmD

## 2020-12-17 NOTE — PROGRESS NOTES
Physical Therapy  Facility/Department: [facility] ICU  Daily Treatment Note  NAME: Thuy Grissom  : 1935  MRN: [id number]    Date of Service: 2020    Discharge Recommendations:  Patient would benefit from continued therapy after discharge   PT Equipment Recommendations  Equipment Needed: No    Assessment   Body structures, Functions, Activity limitations: Decreased functional mobility ; Decreased balance;Decreased strength;Decreased cognition;Decreased endurance;Decreased safe awareness  Assessment: Pt demonstrates decreased balance, functional mobility, endurance, and safety awarenss. Pt requires RLE knee immobilizer at all times. Pt is impulsive requiring verbal and tactile cues for initiation and safety for all mobility tasks. Pt's mobility has improved being able to ambulate 10ft with Min A x2 and RW, transfers Mod Ax2 from EOB and Min A x2 from chair, bed mobility tasks are CGA except for scooting which requires Mod A. Pt planning to return to prior living arrangement where he will receive 24/7 physical assistance; pt would benefit from continued therapy at facility upon discharge. Prognosis: Good  Decision Making: Medium Complexity  PT Education: PT Role;Plan of Care; Functional Mobility Training;Transfer Training;Gait Training  Barriers to Learning: cognition  REQUIRES PT FOLLOW UP: Yes  Activity Tolerance  Activity Tolerance: Patient limited by endurance; Patient limited by fatigue;Patient limited by cognitive status     Patient Diagnosis(es): The primary encounter diagnosis was Coagulopathy ([address] Diagnoses of Elevated INR and Leukocytosis, unspecified type were also pertinent to this visit. has a past medical history of Atherosclerosis, Benign prostatic hyperplasia, Hypertension, Hypothyroidism, Osteoporosis, Paroxysmal A-fib ([address] Pneumonia, Rhabdomyolysis, Smoking, Spondylolisthesis, and Vitamin D deficiency.    has a past surgical history that includes Ankle surgery; hip surgery; and Tonsillectomy and adenoidectomy. Restrictions  Restrictions/Precautions  Restrictions/Precautions: Weight Bearing, Fall Risk  Required Braces or Orthoses?: Yes  Lower Extremity Weight Bearing Restrictions  Right Lower Extremity Weight Bearing: Weight Bearing As Tolerated  Required Braces or Orthoses  Right Lower Extremity Brace: Knee Immobilizer  Position Activity Restriction  Other position/activity restrictions: Up with assistance; 4L O2 in place via nasal cannula  Subjective   General  Response To Previous Treatment: Patient with no complaints from previous session. Family / Caregiver Present: No  Subjective  Subjective: Pt supine in bed and agreeable to therapy with maximum encouragement; RN agreeable to therapy. Pt has no complaints of pain this am.  Pain Screening  Patient Currently in Pain: Denies  Vital Signs  Patient Currently in Pain: Denies       Orientation  Orientation  Overall Orientation Status: Impaired  Orientation Level: Oriented to time;Oriented to person;Disoriented to place; Disoriented to situation  Cognition   Cognition  Overall Cognitive Status: Exceptions  Arousal/Alertness: Delayed responses to stimuli  Following Commands: Follows one step commands with repetition; Follows one step commands with increased time  Attention Span: Attends with cues to redirect; Difficulty attending to directions  Memory: Decreased recall of biographical Information  Safety Judgement: Decreased awareness of need for assistance;Decreased awareness of need for safety  Problem Solving: Assistance required to generate solutions;Assistance required to implement solutions;Decreased awareness of errors  Insights: Decreased awareness of deficits  Initiation: Requires cues for all  Sequencing: Requires cues for some  Objective   Bed mobility  Supine to Sit: Contact guard assistance  Sit to Supine: Unable to assess(Pt retired to chair)  Scooting:  Moderate assistance(Hand held assistance and use of bed rail)  Comment: HOB raised ~45* and use of hand rails; pt requires increased time/effort to perform with moderate verbal and tactile cues for initiation, sequencing, and safety. Transfers  Sit to Stand: Minimal Assistance; Moderate Assistance;2 Person Assistance  Stand to sit: Minimal Assistance; Moderate Assistance;2 Person Assistance  Comment: Pt performed sit <> stand from EOB requiring Mod A x2, transfered from chair with Min A x2. Pt requires moderate verbal and tactile cues for initiation and safety. Ambulation  Ambulation?: Yes  More Ambulation?: No  Ambulation 1  Surface: level tile  Device: Rolling Walker  Assistance: Minimal assistance;2 Person assistance  Quality of Gait: Decreased gait speed, moderate unsteadiness, decreased endurance, labored breathing, staggering gait pattern, lacking heel strike, decreased hip and knee flexion. Gait Deviations: Decreased step length;Decreased step height;Deviated path;Staggers; Slow Chiara  Distance: 10ft x1  Stairs/Curb  Stairs?: No     Balance  Posture: Fair  Sitting - Static: Fair;+  Sitting - Dynamic: Fair;-  Standing - Static: Poor;+  Standing - Dynamic: Poor;-  Comments: Pt performed static sitting while on EOB for ~5 minutes; performed static standing balance with RW and Min A x2 for balance for ~1 minute  Exercises  Straight Leg Raise: 15reps x2 BLE  Hip Abduction: 15 reps x2 BLE(supine)  Knee Active Range of Motion: 15reps x1 LLE supine heel slides  Ankle Pumps: 15 reps x2 BLE  Comments: Pt requires extended rest breaks due to labored breathing      Goals  Short term goals  Time Frame for Short term goals: 14  Short term goal 1: Pt will ambulate 150ft with modified independence using a RW in order to navigate within his home. Short term goal 2: Pt will tolerate 45 minutes of therapy to improve his endurance. Short term goal 3: Pt will transfer sit <> stand with modified independence using a RW to get off the toilet safely.   Short term goal 4: Pt will transfer supine <> sit with modified independence using a bed rail to get out of bed safely. Short term goal 5: Pt will improve his dynamic standing balance to Good (-) to decrease his fall risk. Patient Goals   Patient goals : To return to his [address]  Times per week: 5-6x  Times per day: Daily  Current Treatment Recommendations: Strengthening, Home Exercise Program, Endurance Training, Balance Training, Gait Training, Transfer Training, Functional Mobility Training  Safety Devices  Type of devices: Nurse notified, Chair alarm in place, Gait belt, Patient at risk for falls, Call light within reach, Left in chair  Restraints  Initially in place: No     Therapy Time   Individual Concurrent Group Co-treatment   Time In 0952         Time Out 1031         Minutes 39         Timed Code Treatment Minutes: [address], [facility]  Evaluation/treatment performed by Student PT under the supervision of co-signing PT who agrees with all evaluation/treatment and documentation.

## 2020-12-18 NOTE — PROGRESS NOTES
Physical Therapy  Facility/Department: [facility] SURG ICU  Daily Treatment Note  NAME: Thuy Grissom  : 1935  MRN: [id number]    Date of Service: 2020    Discharge Recommendations:  Patient would benefit from continued therapy after discharge   PT Equipment Recommendations  Equipment Needed: No    Assessment   Body structures, Functions, Activity limitations: Decreased functional mobility ; Decreased balance;Decreased strength;Decreased cognition;Decreased endurance;Decreased safe awareness  Assessment: Pt demonstrates decreased functional mobility, endurance, balance, and safety awareness and requires a RLE knee immobilizer at all times. Pt requires moderate verbal and tactile cues for initation and safety for all functional tasks due to impulsivity. Pt able to ambulate 10ft x2 todays with RW and Min A x2 for balance; transfers Mod A x2 from EOB and toilet and Max A x2 from shower chair. Pt planning to return to prior living arrangement where he will receive 24/ physical assistance; pt would benefit from continued therapy at facility upon discharge. Prognosis: Good  Decision Making: Medium Complexity  PT Education: PT Role;Plan of Care; Functional Mobility Training;Transfer Training;Gait Training  Barriers to Learning: cognition  REQUIRES PT FOLLOW UP: Yes  Activity Tolerance  Activity Tolerance: Patient limited by endurance; Patient limited by fatigue;Patient limited by cognitive status     Patient Diagnosis(es): The primary encounter diagnosis was Coagulopathy ([address] Diagnoses of Elevated INR and Leukocytosis, unspecified type were also pertinent to this visit. has a past medical history of Atherosclerosis, Benign prostatic hyperplasia, Hypertension, Hypothyroidism, Osteoporosis, Paroxysmal A-fib ([address] Pneumonia, Rhabdomyolysis, Smoking, Spondylolisthesis, and Vitamin D deficiency.    has a past surgical history that includes Ankle surgery; hip surgery; and Tonsillectomy and adenoidectomy. Restrictions  Restrictions/Precautions  Restrictions/Precautions: Weight Bearing, Fall Risk  Required Braces or Orthoses?: Yes  Lower Extremity Weight Bearing Restrictions  Right Lower Extremity Weight Bearing: Weight Bearing As Tolerated  Required Braces or Orthoses  Right Lower Extremity Brace: Knee Immobilizer  Position Activity Restriction  Other position/activity restrictions: Up with assistance; 3L O2 in place via nasal cannula  Subjective   General  Response To Previous Treatment: Patient with no complaints from previous session. Family / Caregiver Present: No  Subjective  Subjective: Pt supine in bed and agreeable to therapy; RN agreeable to therapy. Pt has no complaints of pain this morning. Therapy returned following ther ex to perform ambulation with still no complaints. Pain Screening  Patient Currently in Pain: Denies  Vital Signs  Patient Currently in Pain: Denies       Orientation  Orientation  Overall Orientation Status: Impaired  Orientation Level: Oriented to time;Oriented to person;Disoriented to place; Disoriented to situation  Cognition   Cognition  Overall Cognitive Status: Exceptions  Arousal/Alertness: Appropriate responses to stimuli  Following Commands: Follows one step commands with repetition; Follows one step commands with increased time  Attention Span: Attends with cues to redirect; Difficulty attending to directions  Memory: Decreased recall of biographical Information  Safety Judgement: Decreased awareness of need for assistance;Decreased awareness of need for safety  Problem Solving: Assistance required to generate solutions;Assistance required to implement solutions;Decreased awareness of errors  Insights: Decreased awareness of deficits  Initiation: Requires cues for some  Sequencing: Requires cues for some  Objective   Bed mobility  Supine to Sit: Minimal assistance  Sit to Supine: Unable to assess(Pt retired to chair)  Scooting:  Moderate assistance  Comment: HOB raised ~45* and use of hand rails; pt requires increased time/effort to perform with moderate verbal/ tactile cues for initiation and safety. Transfers  Sit to Stand: Moderate Assistance;2 Person Assistance;Maximum Assistance  Stand to sit: Moderate Assistance;2 Person Assistance;Maximum Assistance  Comment: Pt performed all transfers with RW. Mod A x2 from EOB x1 and toilet x1, Max A x2 from shower chair x2. Pt requires moderate verbal and tactile cues for initiation, sequencing, and safety. Ambulation  Ambulation?: Yes  More Ambulation?: No  Ambulation 1  Surface: level tile  Device: Rolling Walker  Assistance: Minimal assistance;2 Person assistance  Quality of Gait: Decreased gait speed, moderate unsteadiness, decreased endurance, labored breathing, staggering gait pattern, lacking heel strike, decreased hip and knee flexion. Gait Deviations: Decreased step length;Decreased step height;Deviated path;Staggers; Slow Chiara  Distance: 10ft x2  Comments: Pt requires maximum verbal and tactile cues for initiation, sequencing, and safety. Stairs/Curb  Stairs?: No     Balance  Posture: Fair  Sitting - Static: Fair;+  Sitting - Dynamic: Fair;-  Standing - Static: Poor;+  Standing - Dynamic: Poor;-  Comments: Pt performed static sitting while on EOB for ~2 minutes and on toilet for ~5 minutes; performed static standing balance with RW and Min A x2 to Mod A x2 for balance for ~2 minutes x3  Exercises  Straight Leg Raise: 15reps x2 BLE  Hip Abduction: 15 reps x2 BLE(supine)  Knee Active Range of Motion: 15reps x2 LLE supine heel slides  Ankle Pumps: 20 reps x1 BLE      Goals  Short term goals  Time Frame for Short term goals: 15  Short term goal 1: Pt will ambulate 150ft with modified independence using a RW in order to navigate within his home. Short term goal 2: Pt will tolerate 45 minutes of therapy to improve his endurance.   Short term goal 3: Pt will transfer sit <> stand with modified independence using a RW to

## 2020-12-18 NOTE — CARE COORDINATION
Patient to return to [facility] - [address] [place] [address] today. Transport requested for 1600 today, updated Fiona at [facility]. Await orders. Attempt to call patient daughter to update with transfer, no answer - unable to leave message - VM full. Discharge [facility]  Written by: Heriberto Stephenson RN    Patient Name: Darin [place]  Attending Provider: Heidi Phelps MD  Admit Date: 2020  5:20 PM  MRN: [id number]  Account: [de-identified]                     : 1935  Discharge Date: 2020      [facility] [address], [facility] at [facility].     Heriberto Stephenson RN

## 2020-12-18 NOTE — PROGRESS NOTES
Pharmacy Note  Warfarin Consult follow-up      Recent Labs     12/18/20  0620   INR 1.9     Recent Labs     12/16/20  0552 12/17/20  0532 12/18/20  0620   HGB 9.1* 9.0* 9.0*   HCT 29.3* 28.0* 27.9*    464* 429         Notes:   INR has rebounded and is close to goal of 2-3. With patient on prednisone will try to level out patient INR and give 2 mg this evening; will continue to monitor closely                  Daily PT/INR while inpatient.       Eddie Patterson, PharmD

## 2020-12-18 NOTE — PROGRESS NOTES
Comprehensive Nutrition Assessment    Type and Reason for Visit:  Reassess    Nutrition Recommendations/Plan:   Continue with Dental soft diet with Mildly thick (nectar) liquids. Continue with Ensure enlive BID and will add magic cup at dinner    Nutrition Assessment:  Pt improving from a nutritional standpoint. PO intakes are  improving as pt states it is easier to eat and his appetite is getting better. Wt is also trending up. Rationale for diet consistency discussed- pt states he is tolerating well.   Will continue with current diet  and supplements (Ensure enlive BID) and will add magic cup at dinner per pt request.    Malnutrition Assessment:  Malnutrition Status:  Mild malnutrition    Context:  Social/Environmental Circumstances     Findings of the 6 clinical characteristics of malnutrition:  Energy Intake:  No significant decrease in energy intake  Weight Loss:  No significant weight loss     Body Fat Loss:  1 - Mild body fat loss Orbital, Triceps   Muscle Mass Loss:  1 - Mild muscle mass loss Temples (temporalis), Clavicles (pectoralis & deltoids)  Fluid Accumulation:  Unable to assess     Strength:  Not Performed    Estimated Daily Nutrient Needs:  Energy (kcal):  2100 kcal/d; Weight Used for Energy Requirements:  Current     Protein (g):  84-101g/d; Weight Used for Protein Requirements:  Current(1.0-1.2g/kg)        Fluid (ml/day):  2100mL/d; Method Used for Fluid Requirements:  1 ml/kcal      Nutrition Related Findings:  improved appetite      Wounds:  Multiple, Stage II, Skin Tears       Current Nutrition Therapies:    Dietary Nutrition Supplements: Standard High Calorie Oral Supplement  DIET GENERAL; Dental Soft; Mildly Thick (Nectar)    Anthropometric Measures:  · Height: 6' 3\" (190.5 cm)  · Current Body Weight: 187 lb (84.8 kg)   · Ideal Body Weight: 196 lbs; % Ideal Body Weight 94.4 %   · BMI: 23.4  · BMI Categories: Normal Weight (BMI 22.0 to 24.9) age over 72       Nutrition Diagnosis: · Increased nutrient needs related to increase demand for energy/nutrients as evidenced by wounds, intake 51-75%      Nutrition Interventions:   Food and/or Nutrient Delivery:  Continue Current Diet, Continue Oral Nutrition Supplement  Nutrition Education/Counseling:  Education not indicated   Coordination of Nutrition Care:  Continue to monitor while inpatient    Goals:  PO intakes to meet >75% of needs       Nutrition Monitoring and Evaluation:   Behavioral-Environmental Outcomes:  None Identified   Food/Nutrient Intake Outcomes:  Food and Nutrient Intake, Supplement Intake  Physical Signs/Symptoms Outcomes:  Chewing or Swallowing, Nutrition Focused Physical Findings, Weight, Skin     Discharge Planning:    Continue Oral Nutrition Supplement, Continue current diet     Electronically signed by Kristopher Elliott RD, LD on 12/18/20 at 9:27 AM EST    Kristopher Elliott RD,LD  Clinical Dietitian  [facility]  [phone]

## 2020-12-18 NOTE — PLAN OF CARE
RT in to administer Aerosol therapy with Duoneb. Pt currently awake watching Tv .02 noted at 4lpm with spot check at 96% . Breath sounds diminished , pt has a weak congested cough , swallows secretions . Incentive  Spirometry as well as Acapella was encouraged , pt not interested in doing now.

## 2020-12-18 NOTE — PLAN OF CARE
NATY  Outcome: Ongoing  12/17/2020 1748 by Tom Rios RN  Outcome: Ongoing  Goal: Demonstrates accurate environmental perceptions  Description: Demonstrates accurate environmental perceptions  12/17/2020 1748 by Tom Rios RN  Outcome: Ongoing  Goal: Able to distinguish between reality-based and nonreality-based thinking  Description: Able to distinguish between reality-based and nonreality-based thinking  12/17/2020 1748 by Tom Rios RN  Outcome: Ongoing  Goal: Able to interrupt nonreality-based thinking  Description: Able to interrupt nonreality-based thinking  12/17/2020 1748 by Tom Rios RN  Outcome: Ongoing     Problem: Sleep Pattern Disturbance:  Goal: Appears well-rested  Description: Appears well-rested  12/17/2020 1748 by Tom Rios RN  Outcome: Ongoing     Problem: Nutrition  Goal: Optimal nutrition therapy  Description: Nutrition Problem #1: Increased nutrient needs  Intervention: Food and/or Nutrient Delivery: Continue Current Diet, Start Oral Nutrition Supplement  Nutritional Goals: PO intakes to meet >75% estimated needs.       12/17/2020 1748 by Tom Rios RN  Outcome: Ongoing     Problem: Musculor/Skeletal Functional Status  Goal: Highest potential functional level  12/17/2020 1748 by Tom Rios RN  Outcome: Ongoing  Goal: Absence of falls  12/17/2020 1748 by Tom Rios RN  Outcome: Ongoing     Problem: Respiratory:  Goal: Ability to maintain a clear airway will improve  Description: Ability to maintain a clear airway will improve  12/17/2020 2034 by Saul Goss RCP  Outcome: Ongoing  12/17/2020 1748 by Tom Rios RN  Outcome: Ongoing  Goal: Ability to maintain adequate ventilation will improve  Description: Ability to maintain adequate ventilation will improve  12/17/2020 2034 by Saul Goss RCP  Outcome: Ongoing  12/17/2020 1748 by Tom Rios RN  Outcome: Ongoing

## 2020-12-18 NOTE — PROGRESS NOTES
initiation and safety awareness     Transfers  Sit to stand: Moderate assistance;2 Person assistance  Stand to sit: Moderate assistance;2 Person assistance  Transfer Comments: Pt signficantly impulsive and unsteady during all functional transfers this date with use of RW requiring mod verbal/tactile cues for proper hand placement/sequencing/safety awareness throughout. Cognition  Overall Cognitive Status: Exceptions  Arousal/Alertness: Appropriate responses to stimuli  Following Commands: Follows one step commands with repetition; Follows one step commands with increased time  Attention Span: Attends with cues to redirect; Difficulty attending to directions  Memory: Decreased recall of biographical Information  Safety Judgement: Decreased awareness of need for assistance;Decreased awareness of need for safety  Problem Solving: Assistance required to generate solutions;Assistance required to implement solutions;Decreased awareness of errors  Insights: Decreased awareness of deficits  Initiation: Requires cues for some  Sequencing: Requires cues for some        Plan   Plan  Times per week: 5-6x/wk  Times per day: Daily  Current Treatment Recommendations: Strengthening, Balance Training, Functional Mobility Training, Endurance Training, Patient/Caregiver Education & Training, Equipment Evaluation, Education, & procurement, Self-Care / ADL, Home Management Training, Safety Education & Training      Goals  Short term goals  Time Frame for Short term goals: 14 visits  Short term goal 1: Pt will perform functional transfers/functional mobility with CGA and use of RW  Short term goal 2: Pt will independently demo good safety awareness during engagement in ADLs and functional transfers/functional mobility  Short term goal 3: Pt will independently incorporate energy conservation techniques as needed during engagement in ADLs and functional transfers/functional mobility  Short term goal 4: Pt will demo 8+ minutes standing tolerance with use of RW for increased participation in ADLs  Short term goal 5: Pt will perform feeding/grooming/UB ADL tasks with mod IND including setup and use of AE/DME PRN  Short term goal 6: Pt will perform LB ADL tasks/toileting tasks with min A including setup and use of AE/DME PRN       Therapy Time   Individual Concurrent Group Co-treatment   Time In  0913         Time Out  0929; returned 1024 to 1055         Minutes  16 + 31 = 47 total minutes          Time Coded Treatment Minutes: [address], OTR/L

## 2020-12-18 NOTE — DISCHARGE SUMMARY
[facility]  Office: [address], DO, Rimma Barrios, DO, Pierce Shown, DO, [employer], DO, Phillip Yap MD, Kike Palacio MD, Jim Oconnor MD, Margarita Salgado MD, Johanna Kumar MD, Danae Sarah MD, Homero Grullon MD, Lora Borja MD, Leandra Back MD, Alma Soriano, DO, Yoon Hennessy MD, Matilde Leal MD, Matilde Bennett DO, Mally Dexter MD,  Mitch Nolasco, DO, Wu Glez MD, Erin Dee MD, Cha [facility], [facility], CNP, Baldo Lim, CNP, Krystina Blake, CNS, Fabian Leyva, CNP, Vipul Sheridan, CNP, Oswaldo Stokes, CNP, Darwin Mcdaniels, CNP, Liza Madden, CNP, Myriam Mejia PA-C, Cory Sylvester, [facility], Franca Cha, CNP, Esme Madison, CNP, Latisha Orosco, CNP, Barbie Raygoza, CNP, Sangita Stone [address]    Discharge Summary     Patient ID: Glenn Ch  :  1935   MRN: [id number]     ACCOUNT:  [de-identified]   Patient's PCP: Lisa Gonsalves MD  Admit Date: 2020   Discharge Date: 2020     Length of Stay: 4  Code Status:  Full Code  Admitting Physician: Gelacio Fong MD  Discharge Physician: Lg Fine, APRN - CNP     Active Discharge Diagnoses:     Hospital Problem Lists:  Principal Problem:    Elevated INR  Active Problems:    Age-related osteoporosis with current pathological fracture    Hypertension    Hypothyroidism    Benign prostatic hyperplasia    Paroxysmal atrial fibrillation (HCC)    Vitamin D deficiency    Chronic respiratory failure with hypoxia, on home oxygen therapy ([facility] [address]    Pneumonia due to infectious organism    Coagulopathy ([facility] [address]  Resolved Problems:    * No resolved hospital problems.  *      Admission Condition:  fair     Discharged Condition: fair    Hospital Stay:     Hospital Course:  Hiram Manzano a 82 y.o. Non-/non  male who presents with Abnormal Lab (possibly PTINR, nursing home told EMS the patient was bleeding out because of his labs, no complaints at this time) and Abrasion (skin tears)   and is admitted to the hospital for the management of Supratherapeutic INR.     During my assessment the patient really was not able to tell me what brought him to the hospital.  According to the ER note the nursing home he resides at called EMS reported they were concerned the patient was bleeding out because of his elevated INR.  According to the ER notes the ER physician spoke to the patient's nursing home who told him his INR was around 9.  His INR obtained here was > 14.8. Jose Sharpe received vitamin K 10 mg IV while in the ER and admitted for further evaluation and work-up.      The INR obtained at 1 AM was 2.3 and then this morning 1.7.  I have asked pharmacy to resume Coumadin dosing.  CBC is stable.  Nursing was concerned the patient may be aspirating with meals related to coughing/choking spells while eating.  Chest x-ray completed shows bibasilar infiltrates concerning for pneumonia.  The patient tells me he does not eat anything solid like sandwiches anymore. Joes Sharpe is unable to give me a good history. Jose Sharpe does report coughing and shortness of breath for at least a month. Jose Sharpe also remembers falling and being in the hospital for a few days in November.  He reports feeling constipated. Jose Sharpe does not think he had a bowel movement in 4 days.  But he also complains that the food at the facility is terrible or he just cannot eat it so that is why he has had a bowel movement.      During my assessment he does have mild rhonchi throughout with expiratory wheezing intermittently on the left and in right upper lobe.  Related to patient history of MRSA Zosyn and Cipro started prophylactically for pneumonia.  Procalcitonin and sputum culture ordered.  Continue to monitor for signs of hypoxia, respiratory distress or fever.  Related to the patient's recent + urine culture will check urine culture and blood cultures prior to antibiotics.  Supratherapeutic INR resolved. Sharita Beverage may be the result of current antibiotic use.      According to care everywhere the patient was admitted to [facility] from November 5 through the number 11th related to pneumonia of the right lower lobe, a fall, a contusion to his back, nontraumatic rhabdomyolysis and a closed nondisplaced fracture of the right patella.  During that visit he was also diagnosed with a Klebsiella UTI.  Keflex was completed on 11/11/2020.  At the time of his discharge he was supposed to be nonweightbearing per Ortho.  His white count at the time of discharge was 10.8. [place] was 2.8.  A chest x-ray completed during that visit also recommended a follow-up CT after completing antibiotics related to patchy infiltrates and/or atelectasis in the right lower lobe. MRSA PCR positive     Per Dr. Cyndy Schulte he feels is reasonable to start with a short course of antibiotics including doxycycline to cover the MRSA. Modified barium swallow study results recommends Dental Soft diet with Nectar thick liquids as evidenced by no observed aspiration noted with consistencies tested.  Recommend small sips and bites, only feed when alert and awake and upright at 90 degrees for all PO intake      He remains confused but is able to follow simple commands. He states he feels sob sometimes but its better. He also tells me he thinks he is swallowing better.  Plan to discharge home/back to the facility soon      Significant therapeutic interventions: lasix po, Zosyn, doxycycline     Significant Diagnostic Studies:   Labs / Micro:  CBC:   Lab Results   Component Value Date    WBC 15.5 12/18/2020    RBC 3.09 12/18/2020    HGB 9.0 12/18/2020    HCT 27.9 12/18/2020    MCV 90.5 12/18/2020    MCH 29.2 12/18/2020    MCHC 32.3 12/18/2020    RDW 18.9 12/18/2020     12/18/2020     BMP:    Lab Results   Component Value Date    GLUCOSE 108 12/18/2020     12/18/2020    K 4.0 12/18/2020     12/18/2020 CO2 30 12/18/2020    ANIONGAP 8 12/18/2020    BUN 30 12/18/2020    CREATININE 0.99 12/18/2020    BUNCRER NOT REPORTED 12/18/2020    CALCIUM 9.4 12/18/2020    LABGLOM >60 12/18/2020    GFRAA >60 12/18/2020    GFR      12/18/2020    GFR NOT REPORTED 12/18/2020     HFP:    Lab Results   Component Value Date    PROT 6.6 12/14/2020     CMP:    Lab Results   Component Value Date    GLUCOSE 108 12/18/2020     12/18/2020    K 4.0 12/18/2020     12/18/2020    CO2 30 12/18/2020    BUN 30 12/18/2020    CREATININE 0.99 12/18/2020    ANIONGAP 8 12/18/2020    ALKPHOS 106 12/14/2020    ALT 12 12/14/2020    AST 27 12/14/2020    BILITOT 0.78 12/14/2020    LABALBU 3.1 12/14/2020    ALBUMIN 0.9 12/14/2020    LABGLOM >60 12/18/2020    GFRAA >60 12/18/2020    GFR      12/18/2020    GFR NOT REPORTED 12/18/2020    PROT 6.6 12/14/2020    CALCIUM 9.4 12/18/2020     PTT:   Lab Results   Component Value Date    APTT 84.4 12/14/2020     U/A:    Lab Results   Component Value Date    COLORU YELLOW 12/14/2020    TURBIDITY CLEAR 12/14/2020    SPECGRAV 1.024 12/14/2020    HGBUR SMALL 12/14/2020    PHUR 5.5 12/14/2020    PROTEINU NEGATIVE 12/14/2020    GLUCOSEU NEGATIVE 12/14/2020    KETUA NEGATIVE 12/14/2020    BILIRUBINUR NEGATIVE 12/14/2020    UROBILINOGEN Normal 12/14/2020    NITRU NEGATIVE 12/14/2020    LEUKOCYTESUR NEGATIVE 12/14/2020        Radiology:  Ct Head Wo Contrast    Result Date: 12/16/2020  No acute intracranial abnormality. Mild volume loss     Xr Chest Portable    Result Date: 12/17/2020  Stable to minimally improving bibasilar airspace opacities. Xr Chest Portable    Result Date: 12/15/2020  Bibasilar infiltrates concerning for pneumonia. Fl Modified Barium Swallow W Video    Result Date: 12/16/2020  No evidence of aspiration. Penetration with thin liquid consistency Please see separate speech pathology report for full discussion of findings and recommendations.        Consultations:    Consults:     Final Specialist Recommendations/Findings:   PHARMACY TO DOSE WARFARIN  IP CONSULT TO PHARMACY  IP CONSULT TO PULMONOLOGY      The patient was seen and examined on day of discharge and this discharge summary is in conjunction with any daily progress note from day of discharge. Discharge plan:     Disposition: To a non-[facility] facility    Physician Follow Up:     Vernon Banegas MD  [address] [facility]  [phone]      hospital follow up in 7-10 days    Sharon Cevallos, [address] [facility]  [address] [facility]  [phone]      follow up in 3-4 weeks       Requiring Further Evaluation/Follow Up POST HOSPITALIZATION/Incidental Findings:  Repeat INR 12/20. Follow up chest xray to evaluate resolution of Pna. Diet: Dental Soft diet with Nectar thick liquids as evidenced by no observed aspiration noted with consistencies tested.  Recommend small sips and bites, only feed when alert and awake and upright at 90 degrees for all PO intake    Activity: As tolerated    Instructions to Patient: Coumadin 2 mg fri 12/18 sat 12/19 sun 12/20. INR Monday. Up in chair for all meals. Follow up with ortho related to knee immobilizer.      Discharge Medications:      Medication List      START taking these medications    doxycycline hyclate 100 MG tablet  Commonly known as: VIBRA-TABS  Take 1 tablet by mouth 2 times daily for 7 days     predniSONE 10 MG tablet  Commonly known as: DELTASONE  Take 1 tablet by mouth daily for 4 days        CHANGE how you take these medications    warfarin 2 MG tablet  Commonly known as: Coumadin  Take 1 tablet by mouth daily 12/19 12/20 repeat INR 12/21  What changed:   · medication strength  · how much to take  · additional instructions        CONTINUE taking these medications    acetaminophen 325 MG tablet  Commonly known as: TYLENOL     collagenase 250 UNIT/GM ointment     digoxin 125 MCG tablet  Commonly known as: LANOXIN     dilTIAZem 120 MG Tb24 extended release tablet  Commonly known as: CARDIZEM LA     ergocalciferol 1.25 MG (01657 UT) capsule  Commonly known as: ERGOCALCIFEROL     finasteride 5 MG tablet  Commonly known as: PROSCAR     levothyroxine 100 MCG tablet  Commonly known as: SYNTHROID     oxyCODONE-acetaminophen 5-325 MG per tablet  Commonly known as: PERCOCET     tamsulosin 0.4 MG capsule  Commonly known as: FLOMAX     Vitamin D3 125 MCG (5000 UT) Tabs           Where to Get Your Medications      You can get these medications from any pharmacy    Bring a paper prescription for each of these medications  · warfarin 2 MG tablet     Information about where to get these medications is not yet available    Ask your nurse or doctor about these medications  · doxycycline hyclate 100 MG tablet  · predniSONE 10 MG tablet         Discharge Procedure Orders   Protime-INR   Standing Status: Future Standing Exp. Date: 12/18/21     Order Specific Question Answer Comments   Daily Coumadin Dose? 2 mg        Time Spent on discharge is  33 mins in patient examination, evaluation, counseling as well as medication reconciliation, prescriptions for required medications, discharge plan and follow up. Electronically signed by   VAIBHAV Mendoza CNP  12/18/2020  2:10 PM      Thank you Dr. Lolita Rangel MD for the opportunity to be involved in this patient's care.

## 2020-12-18 NOTE — FLOWSHEET NOTE
Situation:  Writer learned that Mr. Kelsie Portillo was calling for assistance and went into Pt's room. Assessment:  Mr. Kelsie Portillo was sitting in the bedside chair. He smiled and welcomed writer. He spoke of leaving the hospital and going to another place. He spoke of insurance not covering the place where he will go. He shared about his time in the service and his hobby of hunting. He told writer, Victor Manuel Mandeepjob, I need them,\" when writer told Pt she would keep him in her prayers. Intervention:  Writer provided supportive presence and explored Pt's coping and needs; inquired about Pt's hobbies and interests; offered words of encouragement and support; assured Pt of her prayers. Outcome:  Mr. Kelsie Portillo thanked writer for the visit and invited her to return. 12/18/20 1214   Encounter Summary   Services provided to: Patient   Referral/Consult From: Patient   Support System Children   Continue Visiting   (12/17/20)   Complexity of Encounter Low   Length of Encounter 15 minutes   Routine   Type Follow up   Assessment Approachable   Intervention Active listening;Sustaining presence/ Ministry of presence; Explored feelings, thoughts, concerns   Outcome Engaged in conversation;Expressed gratitude;Receptive     Electronically signed by Nori Quiñones, Oncology Outpatient [facility], [address] [facility]  12/18/2020  12:19 PM

## 2020-12-18 NOTE — PLAN OF CARE
Pt taking aerosol treatments as ordered. SpO2 91% on 3lpm. BS diminished with scattered rhonchi throughout and crackles in the bases. Education given on acapella and IS. Pt tolerates fairly well. Non productive congested cough. Will continue to monitor.

## 2020-12-18 NOTE — PROGRESS NOTES
RT in to check on patient , 02 spot check noted at 97% , pt resting comfortably no distress observed ,02 titrated to 3lpm .

## 2020-12-18 NOTE — PROGRESS NOTES
management of Supratherapeutic INR.     During my assessment the patient really was not able to tell me what brought him to the hospital.  According to the ER note the nursing home he resides at called EMS reported they were concerned the patient was bleeding out because of his elevated INR. According to the ER notes the ER physician spoke to the patient's nursing home who told him his INR was around 9. His INR obtained here was > 14.8. He received vitamin K 10 mg IV while in the ER and admitted for further evaluation and work-up.      The INR obtained at 1 AM was 2.3 and then this morning 1.7. I have asked pharmacy to resume Coumadin dosing. CBC is stable. Nursing was concerned the patient may be aspirating with meals related to coughing/choking spells while eating. Chest x-ray completed shows bibasilar infiltrates concerning for pneumonia. The patient tells me he does not eat anything solid like sandwiches anymore. He is unable to give me a good history. He does report coughing and shortness of breath for at least a month. He also remembers falling and being in the hospital for a few days in November. He reports feeling constipated. He does not think he had a bowel movement in 4 days. But he also complains that the food at the facility is terrible or he just cannot eat it so that is why he has had a bowel movement.      During my assessment he does have mild rhonchi throughout with expiratory wheezing intermittently on the left and in right upper lobe. Related to patient history of MRSA Zosyn and Cipro started prophylactically for pneumonia. Procalcitonin and sputum culture ordered. Continue to monitor for signs of hypoxia, respiratory distress or fever. Related to the patient's recent + urine culture will check urine culture and blood cultures prior to antibiotics. Supratherapeutic INR resolved.   Elevation may be the result of current antibiotic use.      According to care everywhere the patient was admitted to [facility] from November 5 through the number 11th related to pneumonia of the right lower lobe, a fall, a contusion to his back, nontraumatic rhabdomyolysis and a closed nondisplaced fracture of the right patella. During that visit he was also diagnosed with a Klebsiella UTI. Keflex was completed on 11/11/2020. At the time of his discharge he was supposed to be nonweightbearing per Ortho. His white count at the time of discharge was 10.8. INR was 2.8. A chest x-ray completed during that visit also recommended a follow-up CT after completing antibiotics related to patchy infiltrates and/or atelectasis in the right lower lobe. MRSA PCR positive    Per Dr. Manuel Guajardo he feels is reasonable to start with a short course of antibiotics including doxycycline to cover the MRSA. Modified barium swallow study results recommends Dental Soft diet with Nectar thick liquids as evidenced by no observed aspiration noted with consistencies tested. Recommend small sips and bites, only feed when alert and awake and upright at 90 degrees for all PO intake    Lasix 20 mg po given today. Seems less agitated today. Tolerating po intake. He remains confused but is able to follow simple commands. He states he feels sob sometimes but its better. He also tells me he thinks he is swallowing better. Plan to discharge home/back to the facility soon        Review of Systems:     Review of Systems   Unable to perform ROS: Mental status change       Medications:      Allergies:  No Known Allergies    Current Meds:   Scheduled Meds:    ipratropium-albuterol  1 ampule Inhalation 4x daily    predniSONE  20 mg Oral Daily    doxycycline hyclate  100 mg Oral 2 times per day    haloperidol lactate  2 mg Intramuscular Once    warfarin (COUMADIN) daily dosing (placeholder)   Other RX Placeholder    piperacillin-tazobactam  3.375 g Intravenous Q8H    Vitamin D  5 tablet Oral Daily    digoxin  125 mcg Oral Daily    dilTIAZem  120 mg Oral Daily    finasteride  5 mg Oral Daily    levothyroxine  75 mcg Oral Daily    tamsulosin  0.8 mg Oral Nightly    sodium chloride flush  10 mL Intravenous 2 times per day     Continuous Infusions:   PRN Meds: diphenhydrAMINE, ALPRAZolam, sodium chloride flush, potassium chloride **OR** potassium alternative oral replacement **OR** potassium chloride, magnesium sulfate, promethazine **OR** ondansetron, polyethylene glycol, nicotine, acetaminophen **OR** acetaminophen    Data:     Past Medical History:   has a past medical history of Atherosclerosis, Benign prostatic hyperplasia, Hypertension, Hypothyroidism, Osteoporosis, Paroxysmal A-fib ([address] Pneumonia, Rhabdomyolysis, Smoking, Spondylolisthesis, and Vitamin D deficiency. Social History:   reports that he has never smoked. He has never used smokeless tobacco. He reports that he does not drink alcohol or use drugs. Family History: History reviewed. No pertinent family history. Vitals:  /69   Pulse 74   Temp 97.5 °F (36.4 °C) (Oral)   Resp 18   Ht 6' 3\" (1.905 m)   Wt 187 lb 6.3 oz (85 kg)   SpO2 91%   BMI 23.42 kg/m²   Temp (24hrs), Av.8 °F (36.6 °C), Min:97.5 °F (36.4 °C), Max:98.2 °F (36.8 °C)    No results for input(s): POCGLU in the last 72 hours. I/O (24Hr):     Intake/Output Summary (Last 24 hours) at 2020 0840  Last data filed at 2020 0445  Gross per 24 hour   Intake 2270 ml   Output 415 ml   Net 1855 ml       Labs:  Hematology:  Recent Labs     20  0552 20  0532 20  0620   WBC 16.4* 16.7* 15.5*   RBC 3.19* 3.14* 3.09*   HGB 9.1* 9.0* 9.0*   HCT 29.3* 28.0* 27.9*   MCV 91.8 89.1 90.5   MCH 28.5 28.8 29.2   MCHC 31.1 32.3 32.3   RDW 18.9* 18.7* 18.9*    464* 429   MPV 7.8 7.6 7.9   INR 1.3 1.3 1.9     Chemistry:  Recent Labs     20  0552 20  0532 20  0620    136 138   K 3.4* 4.1 4.0   CL 99 100 100   CO2 28 28 30   GLUCOSE 146* 133* 108*   BUN 20 23 30*   CREATININE 0.94 0.82 0.99   ANIONGAP 9 8* 8*   LABGLOM >60 >60 >60   GFRAA >60 >60 >60   CALCIUM 9.2 9.5 9.4     No results for input(s): PROT, LABALBU, LABA1C, S0PWMZE, Y5HGIQA, FT4, TSH, AST, ALT, LDH, GGT, ALKPHOS, LABGGT, BILITOT, BILIDIR, AMMONIA, AMYLASE, LIPASE, LACTATE, CHOL, HDL, LDLCHOLESTEROL, CHOLHDLRATIO, TRIG, VLDL, AJW99NB, PHENYTOIN, PHENYF, URICACID, POCGLU in the last 72 hours. ABG:No results found for: POCPH, PHART, PH, POCPCO2, VZK6YIH, PCO2, POCPO2, PO2ART, PO2, POCHCO3, MUC5IVH, HCO3, NBEA, PBEA, BEART, BE, THGBART, THB, ILZ7XAE, JRTA7IYP, D0EUQQYG, O2SAT, FIO2  Lab Results   Component Value Date/Time    SPECIAL RIGHT ARM 3ML 12/15/2020 01:30 PM     Lab Results   Component Value Date/Time    CULTURE (A) 12/15/2020 01:30 PM     POSITIVE Blood Culture Results called to and read back by: BRANDO POSADA AT 16:15 ON 12/16/20    CULTURE  12/15/2020 01:30 PM     DIRECT GRAM STAIN FROM BOTTLE: GRAM POSITIVE COCCI IN CLUSTERS    CULTURE (A) 12/15/2020 01:30 PM     Detected:  Coagulase negative Staphylococcus species (not  S.epidermidis, or S. ugdunensis)  Culture Results to Follow  Methodology- Polymerase Chain Reaction (PCR)      CULTURE (A) 12/15/2020 01:30 PM     STAPHYLOCOCCUS SPECIES, COAGULASE NEGATIVE A single positive blood culture of coagulase negative Staphylocci, diptheroids, micrococci, Cutibacterium, viridans Streptocci, Bacillus, or Lactobacillus species should be interpreted with caution and viewed as a likely skin contaminant. Radiology:  Xr Chest Portable    Result Date: 12/15/2020  Bibasilar infiltrates concerning for pneumonia. Fl Modified Barium Swallow W Video    Result Date: 12/16/2020  No evidence of aspiration. Penetration with thin liquid consistency Please see separate speech pathology report for full discussion of findings and recommendations. Physical Examination:     Physical Exam  Vitals signs and nursing note reviewed.    Constitutional:       General: He is awake. Appearance: He is cachectic. HENT:      Mouth/Throat:      Mouth: Mucous membranes are dry. Eyes:      Extraocular Movements: Extraocular movements intact. Conjunctiva/sclera: Conjunctivae normal.   Cardiovascular:      Rate and Rhythm: Normal rate and regular rhythm. Pulses: Normal pulses. Heart sounds: Normal heart sounds. Pulmonary:      Effort: Pulmonary effort is normal.      Breath sounds: Normal air entry. Examination of the right-middle field reveals rhonchi. Examination of the left-middle field reveals rhonchi. Examination of the right-lower field reveals rhonchi. Examination of the left-lower field reveals rhonchi and rales. Rhonchi and rales present. Abdominal:      General: Bowel sounds are normal.      Palpations: Abdomen is soft. Musculoskeletal: Normal range of motion. Right lower leg: No edema. Left lower leg: No edema. Skin:     General: Skin is warm and dry. Capillary Refill: Capillary refill takes less than 2 seconds. Comments: No change in previous skin assessment. Neurological:      General: No focal deficit present. GCS: GCS eye subscore is 4. GCS verbal subscore is 4. GCS motor subscore is 6. Motor: Motor function is intact. Psychiatric:         Attention and Perception: He is inattentive. Mood and Affect: Affect is labile. Speech: Speech normal.         Cognition and Memory: Cognition is impaired. Memory is impaired. He exhibits impaired recent memory and impaired remote memory. Comments: Less anxious today but he does get fixated on his cords and figits a lot.           Assessment:     Hospital Problems           Last Modified POA    * (Principal) Elevated INR 12/15/2020 Yes    Overview Signed 12/15/2020  7:12 AM by VAIBHAV Rhodes - MANUELA     11/6/2020         Age-related osteoporosis with current pathological fracture (Chronic) 12/15/2020 Yes    Hypertension 12/15/2020 Yes Hypothyroidism 12/15/2020 Yes    Benign prostatic hyperplasia 12/15/2020 Yes    Paroxysmal atrial fibrillation ([facility] [address] 12/15/2020 Yes    Vitamin D deficiency 12/15/2020 Yes    Chronic respiratory failure with hypoxia, on home oxygen therapy ([facility] [address] 12/15/2020 Yes    Pneumonia due to infectious organism 12/15/2020 Yes    Coagulopathy ([facility] [address] 12/15/2020 Yes          Plan:     CT head without contrast related to increased confusion and agitation. Patient was admitted with supratherapeutic INR    IVF stopped. Continue to monitor I&)    Bibasilar pneumonia; recent hospitalization and positive PCR for MRSA. Continue Zosyn add doxycycline p.o. Pulmonary consulted. Attempt to obtain sputum culture. DuoNeb. Chronic oxygen use at 2.5. Wean oxygen as needed    Solu-Medrol discontinued. Prednisone daily     Supratherapeutic INR resolved. Pharmacy consulted for Coumadin dosing denied history of A. Fib. Monitor INR daily. Continue home dig, Cardizem, Proscar, Synthroid, and Flomax     Diet per speech/video swallow recommendations.   Continue supplements and proper consistency    Xanax as needed for anxiety    Monitor and replace electrolytes as needed     PT/OT evaluate and treat    To facility later today    VAIBHAV Raymond CNP  12/18/2020  8:40 AM

## 2020-12-25 PROBLEM — J18.9 PNEUMONIA: Status: ACTIVE | Noted: 2020-01-01

## 2020-12-25 PROBLEM — Z79.01 CHRONIC ANTICOAGULATION: Status: ACTIVE | Noted: 2020-01-01

## 2020-12-25 NOTE — ED NOTES
Patient's daughter at bedside, updated. Daughter has brought patient dinner and patient was repositioned and set-up to eat. Is feeding himself.      Zoe Overton RN  12/25/20 9989 Normal

## 2020-12-25 NOTE — ED NOTES
Patient to [facility] 85 via [facility] [address].      Reza Caputo RN  12/25/20 8424

## 2020-12-25 NOTE — ED PROVIDER NOTES
[id number] [facility]  [address] [facility] [address]  Phone: [phone]  Fax: [phone]        Pt Name: Vidal Srivastava  MRN: [id number]  [place] 1935  Date of evaluation: 12/25/20      CHIEF COMPLAINT     Chief Complaint   Patient presents with    Respiratory Distress         HISTORY OF PRESENT ILLNESS  (Location/Symptom, Timing/Onset, Context/Setting, Quality, Duration, Modifying Factors, Severity.)    Vidal Srivastava is a 82 y.o. male who presents shortness of breath. According to EMS patient is normally on oxygen at the nursing home nasal cannula ranging from 2 to 5 L. His O2 sat was 85% on 5 L nasal cannula. EMS gave him an albuterol breathing treatment. That increased his O2 sats up to 94% on 5 L nasal cannula. Patient does have history of confusion and is a poor historian. I asked why he was here he did not know. He was recently hospitalized 12/14/2020 through 12/18/2020 with an elevated INR of greater than 14.8. He has a history of atrial fibrillation and is on Coumadin. He was treated with vitamin K and his INR came down. He was discharged home on 12/18 on Coumadin dosing 2 mg daily for 3 days and then a repeat INR. During his hospitalization chest x-ray showed bibasilar infiltrates concerning for pneumonia. Does have a history of MRSA so he was treated with Zosyn and Cipro. He was sent back to the nursing home on doxycycline. Did have a barium swallow study with a recommended dental soft diet. There is a concern that he may be having some aspiration. Patient denies any chest pain. He does admit to a cough but cannot tell me how long he has had the cough. Denies any abdominal pain nausea or vomiting. Denies any swelling of his lower extremities. Denies any fever or chills. On arrival here patient's hands were cold. We are having difficulty picking up a pulse ox.   I was can order an arterial blood gas but once his hands warmed up his O2 sats were 96% on 5 L nasal cannula. REVIEW OF SYSTEMS    (2-9 systems for level 4, 10 or more for level 5)     Review of Systems   Unable to perform ROS: Dementia       PAST MEDICAL HISTORY    has a past medical history of Atherosclerosis, Benign prostatic hyperplasia, Hypertension, Hypothyroidism, Osteoporosis, Paroxysmal A-fib ([address] Pneumonia, Rhabdomyolysis, Smoking, Spondylolisthesis, and Vitamin D deficiency. SURGICAL HISTORY      has a past surgical history that includes Ankle surgery; hip surgery; and Tonsillectomy and adenoidectomy. CURRENTMEDICATIONS       Previous Medications    ACETAMINOPHEN (TYLENOL) 325 MG TABLET    Take 650 mg by mouth every 4 hours as needed for Pain    CHOLECALCIFEROL (VITAMIN D3) 125 MCG (5000 UT) TABS    Take by mouth daily    COLLAGENASE 250 UNIT/GM OINTMENT    Apply 250 g topically daily    DIGOXIN (LANOXIN) 125 MCG TABLET    Take 125 mcg by mouth daily    DILTIAZEM (CARDIZEM LA) 120 MG TB24 EXTENDED RELEASE TABLET    Take 120 mg by mouth daily    DOXYCYCLINE HYCLATE (VIBRA-TABS) 100 MG TABLET    Take 1 tablet by mouth 2 times daily for 7 days    ERGOCALCIFEROL (ERGOCALCIFEROL) 1.25 MG (27411 UT) CAPSULE    Take 50,000 Units by mouth once a week Saturday    FINASTERIDE (PROSCAR) 5 MG TABLET    Take 5 mg by mouth daily    LEVOTHYROXINE (SYNTHROID) 100 MCG TABLET    Take 75 mcg by mouth daily    NYSTATIN (MYCOSTATIN) POWD POWDER    Apply topically 2 times daily groin    OXYCODONE-ACETAMINOPHEN (PERCOCET) 5-325 MG PER TABLET    Take 1-2 tablets by mouth every 6 hours. TAMSULOSIN (FLOMAX) 0.4 MG CAPSULE    Take 0.8 mg by mouth nightly    WARFARIN (COUMADIN) 2 MG TABLET    Take 1 tablet by mouth daily 12/19 12/20 repeat INR 12/21       ALLERGIES     has No Known Allergies. FAMILY HISTORY     has no family status information on file. family history is not on file. SOCIAL HISTORY      reports that he has never smoked.  He has never used smokeless tobacco. He reports that he does not drink alcohol or use drugs. PHYSICAL EXAM    (up to 7 for level 4, 8 or more for level 5)   INITIAL VITALS:  height is 6' 3\" (1.905 m) and weight is 81.6 kg (180 lb). His oral temperature is 97.5 °F (36.4 °C). His blood pressure is 101/50 (abnormal) and his pulse is 59. His respiration is 14 and oxygen saturation is 92%. Physical Exam  HENT:      Right Ear: Tympanic membrane, ear canal and external ear normal.      Left Ear: Tympanic membrane, ear canal and external ear normal.      Mouth/Throat:      Mouth: Mucous membranes are moist.      Pharynx: No oropharyngeal exudate or posterior oropharyngeal erythema. Neck:      Musculoskeletal: Normal range of motion and neck supple. No muscular tenderness. Cardiovascular:      Rate and Rhythm: Normal rate and regular rhythm. Pulses: Normal pulses. Heart sounds: Normal heart sounds. No murmur. Pulmonary:      Effort: Respiratory distress present. Breath sounds: Wheezing and rales present. Comments: Patient is normally on oxygen 2 to 5 L nasal cannula. On arrival his extremities were cold we had difficulty picking up a pulse ox. Once his hands warmed up his O2 sat were 96% on 5 L nasal cannula. Abdominal:      General: Bowel sounds are normal. There is no distension. Tenderness: There is no abdominal tenderness. There is no right CVA tenderness, left CVA tenderness, guarding or rebound. Musculoskeletal: Normal range of motion. Right lower leg: No edema. Left lower leg: No edema. Lymphadenopathy:      Cervical: No cervical adenopathy. Skin:     General: Skin is warm and dry. Capillary Refill: Capillary refill takes less than 2 seconds. Neurological:      Mental Status: He is alert. He is disoriented. Comments: Patient is a poor historian with a history of some dementia. He was not able to tell me why he was here. DIFFERENTIAL DIAGNOSIS/ MDM:     Chest x-ray shows worsening pneumonia.   White blood cell count is 21.6. Patient's INR is back up to 19.9. Given vitamin K 10 mg subcu. Will get a CTA of chest chest patient has positive D-dimer. He was started on Zosyn and Cipro IV. Patient will need to be admitted to the hospital.  Patient has an elevated troponin 34. He has a coagulopathy and denies any chest pain. Will need repeat troponin. He is a full code. DIAGNOSTIC RESULTS     EKG: All EKG's are interpreted by the Emergency Department Physician who either signs or Co-signs this chart in the [address] a cardiologist.      Interpreted by Fredrick Ferguson DO     Rhythm: normal sinus with a first-degree AV block  Rate:65  Axis: normal  Ectopy: PAC  Conduction: normal  ST Segments: no acute change  T Waves: no acute change  Q Waves: V1 and V2. Clinical Impression: normal sinus rhythm with first-degree AV block. Occasional PAC. Muscle artifact. No acute changes. Anterior Q waves present. Unknown if this is new or old. No previous EKG to compare to. RADIOLOGY:  Non-plain film images such as CT, Ultrasound and MRI are read by the radiologist. [facility] radiographic images are visualized and the radiologist interpretations are reviewed as follows:         Interpretation per the Radiologist below, if available at the time of this note:      Ct Head Wo Contrast    Result Date: 12/16/2020  EXAMINATION: CT OF THE HEAD WITHOUT CONTRAST  12/16/2020 5:51 pm TECHNIQUE: CT of the head was performed without the administration of intravenous contrast. Dose modulation, iterative reconstruction, and/or weight based adjustment of the mA/kV was utilized to reduce the radiation dose to as low as reasonably achievable. COMPARISON: None.  HISTORY: ORDERING SYSTEM PROVIDED HISTORY: confusion TECHNOLOGIST PROVIDED HISTORY: confusion Reason for Exam: Pt has increased confusion, possible bleed Acuity: Acute Type of Exam: Initial Relevant Medical/Surgical History: scan repeated due to motion FINDINGS: airspace opacities. Xr Chest Portable    Result Date: 12/15/2020  EXAMINATION: ONE XRAY VIEW OF THE CHEST 12/15/2020 10:22 am COMPARISON: None. HISTORY: ORDERING SYSTEM PROVIDED HISTORY: possible aspiration TECHNOLOGIST PROVIDED HISTORY: possible aspiration Reason for Exam: Possible aspiration Acuity: Acute Type of Exam: Initial FINDINGS: There are bibasilar infiltrates. There is no effusion. There is no pneumothorax. The mediastinal structures are unremarkable. The upper abdomen is unremarkable. The extrathoracic soft tissues are unremarkable. Bibasilar infiltrates concerning for pneumonia. Fl Modified Barium Swallow W Video    Result Date: 12/16/2020  EXAMINATION: MODIFIED BARIUM SWALLOW WAS PERFORMED IN CONJUNCTION WITH SPEECH PATHOLOGY SERVICES TECHNIQUE: Fluoroscopic evaluation of the swallowing mechanism was performed with multiple consistency of barium product. FLUOROSCOPY DOSE AND TYPE OR TIME AND EXPOSURES: DAP 6.406mGy 0.9 minutes COMPARISON: None HISTORY: ORDERING SYSTEM PROVIDED HISTORY: Patient high risk for aspiration Reason for Exam: high risk for aspiration FINDINGS: Oral phase of swallowing was grossly within normal limits. No evidence of aspiration. Penetration with thin liquid consistency. Minimal vallecular and piriform stasis with all consistencies. No evidence of aspiration. Penetration with thin liquid consistency Please see separate speech pathology report for full discussion of findings and recommendations. Chest x-ray shows bilateral bibasilar opacifications increased from previous chest x-ray. Ct Head Wo Contrast    Result Date: 12/16/2020  EXAMINATION: CT OF THE HEAD WITHOUT CONTRAST  12/16/2020 5:51 pm TECHNIQUE: CT of the head was performed without the administration of intravenous contrast. Dose modulation, iterative reconstruction, and/or weight based adjustment of the mA/kV was utilized to reduce the radiation dose to as low as reasonably achievable. COMPARISON: None. HISTORY: ORDERING SYSTEM PROVIDED HISTORY: confusion TECHNOLOGIST PROVIDED HISTORY: confusion Reason for Exam: Pt has increased confusion, possible bleed Acuity: Acute Type of Exam: Initial Relevant Medical/Surgical History: scan repeated due to motion FINDINGS: BRAIN/VENTRICLES: There is no acute intracranial hemorrhage, mass effect or midline shift. No abnormal extra-axial fluid collection. The gray-white differentiation is maintained without evidence of an acute infarct. There is no evidence of hydrocephalus. Mild volume loss X few scattered white matter changes. ORBITS: The visualized portion of the orbits demonstrate no acute abnormality. SINUSES: The visualized paranasal sinuses and mastoid air cells demonstrate no acute abnormality. SOFT TISSUES/SKULL:  No acute abnormality of the visualized skull or soft tissues. No acute intracranial abnormality. Mild volume loss     Xr Chest Portable    Result Date: 12/25/2020  EXAMINATION: ONE XRAY VIEW OF THE CHEST 12/25/2020 11:59 am COMPARISON: December 17, 2020 HISTORY: ORDERING SYSTEM PROVIDED HISTORY: SOB TECHNOLOGIST PROVIDED HISTORY: SOB Reason for Exam: shortness of breath Acuity: Acute Type of Exam: Initial Relevant Medical/Surgical History: recent admission noted in chart. FINDINGS: Subtle patchy bilateral lung opacities have increased. Cardiomegaly. No pulmonary edema     Subtle patchy bilateral lung opacities increased from the prior study are likely on the basis of pneumonia. Xr Chest Portable    Result Date: 12/17/2020  EXAMINATION: ONE XRAY VIEW OF THE CHEST 12/17/2020 9:44 am COMPARISON: 12/15/2020 HISTORY: ORDERING SYSTEM PROVIDED HISTORY: aspiration TECHNOLOGIST PROVIDED HISTORY: aspiration Reason for Exam: Ap upr in bed. Jade Adame history states aspiration. Acuity: Acute Type of Exam: Initial FINDINGS: Cardiac silhouette is normal in size. There are ill-defined bilateral lower lobe airspace opacities.   The left basilar opacity is slightly improved on current examination as compared to the previous although. Right basilar opacity remains unchanged. No pleural effusion or pneumothorax. Osseous structures and soft tissues are grossly intact. Stable to minimally improving bibasilar airspace opacities. Xr Chest Portable    Result Date: 12/15/2020  EXAMINATION: ONE XRAY VIEW OF THE CHEST 12/15/2020 10:22 am COMPARISON: None. HISTORY: ORDERING SYSTEM PROVIDED HISTORY: possible aspiration TECHNOLOGIST PROVIDED HISTORY: possible aspiration Reason for Exam: Possible aspiration Acuity: Acute Type of Exam: Initial FINDINGS: There are bibasilar infiltrates. There is no effusion. There is no pneumothorax. The mediastinal structures are unremarkable. The upper abdomen is unremarkable. The extrathoracic soft tissues are unremarkable. Bibasilar infiltrates concerning for pneumonia. Ct Chest Pulmonary Embolism W Contrast    Result Date: 12/25/2020  EXAMINATION: CTA OF THE CHEST 12/25/2020 12:44 pm TECHNIQUE: CTA of the chest was performed after the administration of intravenous contrast.  Multiplanar reformatted images are provided for review. MIP images are provided for review. Dose modulation, iterative reconstruction, and/or weight based adjustment of the mA/kV was utilized to reduce the radiation dose to as low as reasonably achievable. COMPARISON: Chest radiograph December 25, 2020 HISTORY: ORDERING SYSTEM PROVIDED HISTORY: SOB, elevated D dimer TECHNOLOGIST PROVIDED HISTORY: SOB, elevated D dimer Reason for Exam: SOB elevated dimer Acuity: Acute Type of Exam: Initial FINDINGS: Chest wall: No axillary adenopathy. Upper Abdomen: Thickening of the adrenal glands. Mediastinum: Cardiomegaly. No pericardial effusion. Coronary artery calcifications. Right paratracheal lymph node measures 10 mm in short axis. Prominent nonspecific mediastinal lymph nodes. No left hilar adenopathy. Right hilar adenopathy. Atherosclerotic calcification of the thoracic aorta. Pulmonary Arteries: No central or segmental pulmonary embolus. Lungs: Bilateral lower lobe consolidation. Scattered bilateral ground-glass nodular opacities. No pleural effusions. Large bleb within the lingula. Bones: Thoracic spine degenerative changes. No central or segmental pulmonary embolus. Bilateral lower lobe consolidation and scattered bilateral ground-glass nodular opacities are likely on the basis of pneumonia. Recommend follow-up. Fl Modified Barium Swallow W Video    Result Date: 12/16/2020  EXAMINATION: MODIFIED BARIUM SWALLOW WAS PERFORMED IN CONJUNCTION WITH SPEECH PATHOLOGY SERVICES TECHNIQUE: Fluoroscopic evaluation of the swallowing mechanism was performed with multiple consistency of barium product. FLUOROSCOPY DOSE AND TYPE OR TIME AND EXPOSURES: DAP 6.406mGy 0.9 minutes COMPARISON: None HISTORY: ORDERING SYSTEM PROVIDED HISTORY: Patient high risk for aspiration Reason for Exam: high risk for aspiration FINDINGS: Oral phase of swallowing was grossly within normal limits. No evidence of aspiration. Penetration with thin liquid consistency. Minimal vallecular and piriform stasis with all consistencies. No evidence of aspiration. Penetration with thin liquid consistency Please see separate speech pathology report for full discussion of findings and recommendations.      LABS:  Results for orders placed or performed during the hospital encounter of 12/25/20   CBC Auto Differential   Result Value Ref Range    WBC 21.6 (H) 3.5 - 11.0 k/uL    RBC 3.72 (L) 4.5 - 5.9 m/uL    Hemoglobin 10.8 (L) 13.5 - 17.5 g/dL    Hematocrit 33.7 (L) 41 - 53 %    MCV 90.4 80 - 100 fL    MCH 29.0 26 - 34 pg    MCHC 32.1 31 - 37 g/dL    RDW 19.6 (H) 12.5 - 15.4 %    Platelets 245 428 - 241 k/uL    MPV 8.2 6.0 - 12.0 fL    NRBC Automated NOT REPORTED per 100 WBC    Differential Type NOT REPORTED     Seg Neutrophils 90 (H) 36 - 66 %    Lymphocytes 7 (L) 24 - 44 %    Monocytes 3 2 - 11 %    Eosinophils % 0 (L) 1 - 4 %    Basophils 0 0 - 2 %    Immature Granulocytes NOT REPORTED 0 %    Segs Absolute 19.40 (H) 1.8 - 7.7 k/uL    Absolute Lymph # 1.40 1.0 - 4.8 k/uL    Absolute Mono # 0.70 0.1 - 1.2 k/uL    Absolute Eos # 0.00 0.0 - 0.4 k/uL    Basophils Absolute 0.00 0.0 - 0.2 k/uL    Absolute Immature Granulocyte NOT REPORTED 0.00 - 0.30 k/uL    WBC Morphology NOT REPORTED     RBC Morphology NOT REPORTED     Platelet Estimate NOT REPORTED    Comprehensive Metabolic Panel w/ Reflex to MG   Result Value Ref Range    Glucose 114 (H) 70 - 99 mg/dL    BUN 24 (H) 8 - 23 mg/dL    CREATININE 0.88 0.70 - 1.20 mg/dL    Bun/Cre Ratio NOT REPORTED 9 - 20    Calcium 9.2 8.6 - 10.4 mg/dL    Sodium 143 135 - 144 mmol/L    Potassium 3.7 3.7 - 5.3 mmol/L    Chloride 102 98 - 107 mmol/L    CO2 32 (H) 20 - 31 mmol/L    Anion Gap 9 9 - 17 mmol/L    Alkaline Phosphatase 90 40 - 129 U/L    ALT 28 5 - 41 U/L    AST 20 <40 U/L    Total Bilirubin 0.63 0.3 - 1.2 mg/dL    Total Protein 6.4 6.4 - 8.3 g/dL    Alb 3.1 (L) 3.5 - 5.2 g/dL    Albumin/Globulin Ratio 0.9 (L) 1.0 - 2.5    GFR Non-African American >60 >60 mL/min    GFR African American >60 >60 mL/min    GFR Comment          GFR Staging NOT REPORTED    Troponin   Result Value Ref Range    Troponin, High Sensitivity 34 (H) 0 - 22 ng/L    Troponin T NOT REPORTED <0.03 ng/mL    Troponin Interp NOT REPORTED    Brain Natriuretic Peptide   Result Value Ref Range    Pro- (H) <300 pg/mL    BNP Interpretation Pro-BNP Reference Range:    Protime-INR   Result Value Ref Range    Protime 19.9 (H) 9.4 - 12.6 sec    INR 1.9    APTT   Result Value Ref Range    PTT 37.4 (H) 21.3 - 31.3 sec   D-Dimer, Quantitative   Result Value Ref Range    D-Dimer, Quant 1.04 mg/L FEU   Lactate, Sepsis   Result Value Ref Range    Lactic Acid, Sepsis 1.9 0.5 - 1.9 mmol/L    Lactic Acid, Sepsis, Whole Blood NOT REPORTED 0.5 - 1.9 mmol/L   Lactate, Sepsis   Result Value Ref Range    Lactic Acid, Sepsis 1.1 0.5 - 1.9 mmol/L    Lactic Acid, Sepsis, Whole Blood NOT REPORTED 0.5 - 1.9 mmol/L   Troponin   Result Value Ref Range    Troponin, High Sensitivity 32 (H) 0 - 22 ng/L    Troponin T NOT REPORTED <0.03 ng/mL    Troponin Interp NOT REPORTED    COVID-19    Specimen: Other   Result Value Ref Range    SARS-CoV-2          SARS-CoV-2, Rapid Not Detected Not Detected    Source . NASOPHARYNGEAL SWAB     SARS-CoV-2                 EMERGENCY DEPARTMENT COURSE:   Vitals:    Vitals:    12/25/20 1230 12/25/20 1315 12/25/20 1345 12/25/20 1430   BP: (!) 95/53 (!) 86/42 (!) 101/50    Pulse: 64 58  59   Resp:       Temp:       TempSrc:       SpO2: 92%   92%   Weight:       Height:         -------------------------  BP: (!) 101/50, Temp: 97.5 °F (36.4 °C), Pulse: 59, Resp: 14      RE-EVALUATION:  4:05 PM [facility] called back and accepted patient for transfer    CONSULTS:  3:50 PM [facility] nurse practitioner states beds are very tight at [place].  She is going to speak with the nursing supervisor and call us back. PROCEDURES:  None    FINAL IMPRESSION      1. Pneumonia due to organism    2. Warfarin-induced coagulopathy (HCC)    3. Elevated troponin          DISPOSITION/PLAN   DISPOSITION        CONDITION ON DISPOSITION:   Stable    PATIENT REFERRED TO:  No follow-up provider specified. DISCHARGE MEDICATIONS:  New Prescriptions    No medications on file       (Please note that portions of this note were completed with a voicerecognition program.  Efforts were made to edit the dictations but occasionally words are mis-transcribed. )    Blount MD, F.A.C.E.P.   Attending Emergency Medicine Physician       Stu Ortiz, DO  12/25/20 9455

## 2020-12-26 PROBLEM — A41.9 SEPSIS (HCC): Status: ACTIVE | Noted: 2020-01-01

## 2020-12-26 PROBLEM — J15.9 COMMUNITY ACQUIRED BACTERIAL PNEUMONIA: Status: ACTIVE | Noted: 2020-01-01

## 2020-12-26 PROBLEM — E87.20 LACTIC ACIDOSIS: Status: ACTIVE | Noted: 2020-01-01

## 2020-12-26 NOTE — PROGRESS NOTES
Pharmacy Note  Vancomycin Consult    Fiona Caro is a 82 y.o. male started on Vancomycin for pneumonia; consult received from Dr. Ranjan South to manage therapy. Also receiving the following antibiotics: levaquin. Patient Active Problem List   Diagnosis    Elevated INR    Age-related osteoporosis with current pathological fracture    Atherosclerotic peripheral vascular disease (HCC)    Closed nondisplaced longitudinal fracture of right patella    Pathological compression fracture of lumbar vertebra (HCC)    Hypertension    Hypothyroidism    Benign prostatic hyperplasia    Paroxysmal atrial fibrillation (HCC)    Vitamin D deficiency    Chronic respiratory failure with hypoxia, on home oxygen therapy ([facility] [address]    Pneumonia due to infectious organism    Coagulopathy (HCC)    Pneumonia    Chronic anticoagulation       Allergies:  Patient has no known allergies. Temp max: 98.6    Recent Labs     12/25/20  1130 12/26/20  0410   BUN 24* 25*       Recent Labs     12/25/20  1130 12/26/20  0410   CREATININE 0.88 1.16       Recent Labs     12/25/20  1130 12/26/20  0410   WBC 21.6* 20.6*         Intake/Output Summary (Last 24 hours) at 12/26/2020 7774  Last data filed at 12/25/2020 1758  Gross per 24 hour   Intake --   Output 220 ml   Net -220 ml       Culture Date      Source                       Results  12/25/20             blood x 2                    no growth to date    Ht Readings from Last 1 Encounters:   12/25/20 6' 3\" (1.905 m)        Wt Readings from Last 1 Encounters:   12/25/20 180 lb (81.6 kg)         Body mass index is 22.5 kg/m². Estimated Creatinine Clearance: 56 mL/min (based on SCr of 1.16 mg/dL). Goal Trough Level: 15-20 mcg/mL    Assessment/Plan:  Will initiate Vancomycin 1250 mg IV every 24 hours. Timing of trough level will be determined based on culture results, renal function, and clinical response. Thank you for the consult. Will continue to follow.     Yesenia Saldana, WilmanD

## 2020-12-26 NOTE — PROGRESS NOTES
[facility]  Office: [address], DO, Saadia Lubin, DO, Mark Alba, DO, Jarvis Oviedo, DO, Alayna Sheets MD, Riana Diane MD, Lisette Mistry MD, Bing Hughes MD, Thais Don MD, Chele Casarez MD, Sharma Babinski, MD, Da Acuna MD, Leandra Hyde MD, Adam Werner DO, Bari Fagan MD, Una Breaux MD, Lucila Velazquez, DO, Inna Champagne MD,  Darrel Paz DO, John Paul Oswald MD, Bill Marie MD, Danie Kim [facility], [facility], [facility], Allan Ortiz, CNP, Nata Jack, CNS, Iveth Castillo, CNP, Nir Cruz, CNP, Jacque Lacey, CNP, Rebeca Douglas, CNP, Xi Reveles, CNP, Latoya Valdes PA-C, Joi Perzaa, [facility], Sandra Garay, CNP, Sadi Buchanan, CNP, Tank Schaeffer, CNP, Surinder Beach, [facility], Jalaroxy Steele, [address] [place] [address]    Progress Note    12/26/2020    4:57 PM    Name:   Jami Leach  MRN:     [id number]     [place]:      [de-identified]   Room:   [facility] Day:  1  Admit Date:  12/25/2020 11:29 AM    PCP:   Ricky Fiore MD  Code Status:  Full Code    Subjective:     C/C:   Chief Complaint   Patient presents with    Respiratory Distress     Interval History Status: not changed. Patient seen at bedside. SPO2 ranges between 88 and 92%. Patient does have dementia and is agitated and pulling at lines. Review of Systems:     Patient is agitated, demented and not participating in the review of systems    Medications:      Allergies:  No Known Allergies    Current Meds:   Scheduled Meds:    warfarin (COUMADIN) daily dosing (placeholder)   Other RX Placeholder    multivitamin  1 tablet Oral Daily    cefTRIAXone (ROCEPHIN) IV  2 g Intravenous Q24H    midodrine  5 mg Oral TID WC    sodium chloride flush  10 mL Intravenous 2 times per day    ipratropium-albuterol  1 ampule Inhalation Q4H WA    digoxin  125 mcg Oral Daily    dilTIAZem  120 mg Oral Daily    finasteride  5 mg Oral Daily    levothyroxine  75 mcg Oral Daily    tamsulosin  0.8 mg Oral Nightly     Continuous Infusions:    sodium chloride 125 mL/hr at 20 0806     PRN Meds: sodium chloride flush, sodium chloride flush, nicotine, promethazine **OR** ondansetron, magnesium hydroxide, acetaminophen **OR** acetaminophen, albuterol    Data:     Past Medical History:   has a past medical history of Atherosclerosis, Benign prostatic hyperplasia, Hypertension, Hypothyroidism, Osteoporosis, Paroxysmal A-fib ([address] Pneumonia, Rhabdomyolysis, Smoking, Spondylolisthesis, and Vitamin D deficiency. Social History:   reports that he has never smoked. He has never used smokeless tobacco. He reports that he does not drink alcohol or use drugs. Family History: History reviewed. No pertinent family history. Vitals:  BP (!) 90/49   Pulse 69   Temp 98.6 °F (37 °C) (Oral)   Resp 20   Ht 6' 3\" (1.905 m)   Wt 180 lb (81.6 kg)   SpO2 93%   BMI 22.50 kg/m²   Temp (24hrs), Av.1 °F (36.7 °C), Min:97.9 °F (36.6 °C), Max:98.6 °F (37 °C)    No results for input(s): POCGLU in the last 72 hours. I/O (24Hr):     Intake/Output Summary (Last 24 hours) at 2020 1657  Last data filed at 2020 1507  Gross per 24 hour   Intake 200 ml   Output 220 ml   Net -20 ml       Labs:  Hematology:  Recent Labs     20  1130 20  2224 20  0410   WBC 21.6*  --  20.6*   RBC 3.72*  --  3.10*   HGB 10.8*  --  9.0*   HCT 33.7*  --  31.3*   MCV 90.4  --  101.0   MCH 29.0  --  29.0   MCHC 32.1  --  28.8   RDW 19.6*  --  20.1*     --  250   MPV 8.2  --  11.0   INR 1.9 2.1 2.1   DDIMER 1.04  --   --      Chemistry:  Recent Labs     20  1130 20  1400 20  2141 20  0410 20  1159     --   --  133* 135   K 3.7  --   --  3.9  --      --   --  101  --    CO2 32*  --   --  27  --    GLUCOSE 114*  --   --  104*  --    BUN 24*  --   --  25*  --    CREATININE 0.88  --   --  1.16  --    ANIONGAP 9 --   --  5*  --    LABGLOM >60  --   --  >60  --    GFRAA >60  --   --  >60  --    CALCIUM 9.2  --   --  8.4*  --    PROBNP 488*  --   --   --   --    TROPHS 34* 32*  --  30* 29*   DIGOXIN  --   --   --  0.8  --    LACTACIDWB  --   --  1.7  --   --      Recent Labs     12/25/20  1130   PROT 6.4   LABALBU 3.1*   AST 20   ALT 28   ALKPHOS 90   BILITOT 0.63     ABG:No results found for: POCPH, PHART, PH, POCPCO2, GYK3IOW, PCO2, POCPO2, PO2ART, PO2, POCHCO3, BIE0NHH, HCO3, NBEA, PBEA, BEART, BE, THGBART, THB, ZKQ8ZWB, FIHM3NQV, P9RCZMNS, O2SAT, FIO2  Lab Results   Component Value Date/Time    SPECIAL left arm 20cc 12/25/2020 01:55 PM     Lab Results   Component Value Date/Time    CULTURE NO GROWTH 16 HOURS 12/25/2020 01:55 PM       Radiology:  Xr Chest (2 Vw)    Result Date: 12/26/2020  Similar findings to those seen on recent CT, compatible with pneumonia and some basilar atelectasis/consolidation. Xr Chest Portable    Result Date: 12/25/2020  Subtle patchy bilateral lung opacities increased from the prior study are likely on the basis of pneumonia. Ct Chest Pulmonary Embolism W Contrast    Result Date: 12/25/2020  No central or segmental pulmonary embolus. Bilateral lower lobe consolidation and scattered bilateral ground-glass nodular opacities are likely on the basis of pneumonia. Recommend follow-up. Physical Examination:        General appearance: Chronically ill-appearing male sitting at the side of the bed, appears agitated  Mental Status: Patient not answering any questions appears confused  Lungs:  Course to auscultation the lower lobes bilaterally with diminished breath sounds at the bases.   Heart:  regular rate and rhythm, no murmur  Abdomen:  soft, nontender, nondistended, normal bowel sounds, no masses, hepatomegaly, splenomegaly +sacral ulcer  Extremities:  no edema, redness, tenderness in the calves  Skin:  no gross lesions, rashes, induration    Assessment:        Hospital Problems Last Modified POA    * (Principal) Community acquired bacterial pneumonia 12/26/2020 Yes    Hypertension 12/25/2020 Yes    Hypothyroidism 12/25/2020 Yes    Benign prostatic hyperplasia 12/25/2020 Yes    Paroxysmal atrial fibrillation ([facility] [address] 12/25/2020 Yes    Chronic anticoagulation 12/25/2020 Yes    Lactic acidosis 12/26/2020 Yes    Sepsis ([facility] [address] [facility] 12/26/2020 Yes          Plan:        1. Sepsis 2/2 Community-acquired pneumonia: Patient presents with tachycardia, hypoxia, and hypotension with a blood pressure of 76/40. WBC 21. Blood cultures currently negative. Lactic acid is elevated at 3.5. Continue IV antibiotics per ID recommendations. Check urinary antigens. Patient Covid negative. Continue IV fluids per sepsis protocol. Midodrine ordered for hypotension  2. Continue Cardizem and Coumadin for treatment of atrial fibrillation  3. Continue Synthroid 75 mcg daily  4. Continue daily multivitamin  5. Continue Flomax for treatment of BPH  6. Bladder scan per shift, straight cath for urinary retention greater than 250 cc  7. Avoid any QTC prolonging drugs  8. Hgb did decrease from 10.8-9.0, suspect dilutional component. Continue to monitor hemoglobin, transfuse as needed for symptomatic anemia or hemoglobin less than 7.   If continues to drop will check studies    Carlos Trimble,   12/26/2020  4:57 PM

## 2020-12-26 NOTE — H&P
[facility]  Office: [address], DO, Zoran Morrison, DO, Xu Murray, DO, Tarun Henry Blood, DO, Ramón Portillo MD, Jacoby Garcia MD, Casey Garza MD, Tate Johnson MD, Panchito Mike MD, Brian Aguirre MD, Kim Dixon MD, Kwame Schmitz MD, Leandra Bradshaw MD, Raman Herbert DO, Alireza Berman MD, Cheyenne Ames MD, Boston Barrios DO, Mikal Cardozo MD,  Ruma Davis DO, Dennie Skinner, MD, Mallory Meredith MD, Vernon Dee [facility], [facility], CNP, Duong Walker, CNP, Cici Meier, CNS, Jose Loza, CNP, Laura Cantrell, CNP, Deborah Smith, CNP, Charbel Jaeger, CNP, Konrad Standard, CNP, Kayden Pruitt PA-C, Isael Elaine, DNP, Holden Second, CNP, Darrell Cord, CNP, Dakota Contras, CNP, Howie East, CNP, Radha Meth, CNP         [address]    HISTORY AND PHYSICAL EXAMINATION            Date:   12/25/2020  Patient name:  Lawyer Hubbard  Date of admission:  12/25/2020 11:29 AM  MRN:   [id number]  Account:  [de-identified]  YOB: 1935  PCP:    Nando Calderon MD  Room:   [facility]/1051-  Code Status:    Full Code    Chief Complaint:     Chief Complaint   Patient presents with    Respiratory Distress       History Obtained From:     electronic medical record    History of Present Illness:     Lawyer Hubbard is a 82 y.o. Non-/non  male who presents with Respiratory Distress   and is admitted to the hospital for the management of Pneumonia. This is an 82 yr old male with AFib (chronically anticoagulated with warfarin), HTN, BPH, hypothyroidism, dementia who presents from [facility] ER with shortness of breath. At baseline, patient is oxygen dependent at [address] EMS was called from patient's nursing facility for increased shortness of breath. Pulse Ox on EMS arrival: 85% on 4L. Patient is a poor historian, thus most information is collected from EHR.   Patient recently discharged from our service on 12/18/2020 after treatment for pneumonia and supra therapeutic INR. Patient was discharged on PO doxycycline. [facility]. WBC elevated at 21.6, Trop: 34-->32, pro-bnp: 488, glucose: 114, HGB: 10.8, INR: 1.9, PT: 19.9, PTT: 37.4, D-Dimer: 1.04. CT Chest negative for PE and COVID-19 swab negative. Patient was given 10mg SC Vitamin K for reported INR: 19.9 (do not see this value represented) and was given 1 dose of IV zosyn and Cipro. Patient is transferred to our facility for admission and continued management of Pneumonia. On my evaluation, patient is awake and oriented to self only and is resting in bed. He is some what anxious and repeatedly asking to use his home oxygen tubing. He is unable to provide any additional history. At present, denies any CP or SOB. He does endorse and coarse cough. Denies ABD pain, n/v/d. Vitals stable and no clinical signs of distress. Past Medical History:     Past Medical History:   Diagnosis Date    Atherosclerosis     Benign prostatic hyperplasia     Hypertension     Hypothyroidism     Osteoporosis     Paroxysmal A-fib ([address]     Pneumonia     Rhabdomyolysis     Smoking     Spondylolisthesis     Vitamin D deficiency         Past Surgical History:     Past Surgical History:   Procedure Laterality Date    ANKLE SURGERY      HIP SURGERY      TONSILLECTOMY AND ADENOIDECTOMY          Medications Prior to Admission:     Prior to Admission medications    Medication Sig Start Date End Date Taking?  Authorizing Provider   nystatin (MYCOSTATIN) POWD powder Apply topically 2 times daily groin   Yes Historical Provider, MD   doxycycline hyclate (VIBRA-TABS) 100 MG tablet Take 1 tablet by mouth 2 times daily for 7 days 12/18/20 12/25/20 Yes Jordan Loud, APRN - CNP   warfarin (COUMADIN) 2 MG tablet Take 1 tablet by mouth daily 12/19 12/20 repeat INR 12/21 12/18/20  Yes Jordan Loud, APRN - CNP   digoxin (LANOXIN) 125 MCG tablet Take 125 mcg by mouth daily   Yes Historical Provider, MD   dilTIAZem (CARDIZEM LA) 120 MG TB24 extended release tablet Take 120 mg by mouth daily   Yes Historical Provider, MD   ergocalciferol (ERGOCALCIFEROL) 1.25 MG (67880 UT) capsule Take 50,000 Units by mouth once a week 20 Yes Historical Provider, MD   finasteride (PROSCAR) 5 MG tablet Take 5 mg by mouth daily   Yes Historical Provider, MD   oxyCODONE-acetaminophen (PERCOCET) 5-325 MG per tablet Take 1-2 tablets by mouth every 6 hours. 20  Yes Historical Provider, MD   tamsulosin (FLOMAX) 0.4 MG capsule Take 0.8 mg by mouth nightly   Yes Historical Provider, MD   levothyroxine (SYNTHROID) 100 MCG tablet Take 75 mcg by mouth daily   Yes Historical Provider, MD   collagenase 250 UNIT/GM ointment Apply 250 g topically daily 20  Yes Historical Provider, MD   acetaminophen (TYLENOL) 325 MG tablet Take 650 mg by mouth every 4 hours as needed for Pain   Yes Historical Provider, MD   Cholecalciferol (VITAMIN D3) 125 MCG (5000 UT) TABS Take by mouth daily   Yes Historical Provider, MD        Allergies:     Patient has no known allergies. Social History:     Tobacco:    reports that he has never smoked. He has never used smokeless tobacco.  Alcohol:      reports no history of alcohol use. Drug Use:  reports no history of drug use. Family History:     History reviewed. No pertinent family history. Review of Systems:     Positive and Negative as described in HPI. Review of Systems   Unable to perform ROS: Dementia       Physical Exam:   BP (!) 85/45   Pulse 72   Temp 97.9 °F (36.6 °C) (Axillary)   Resp 18   Ht 6' 3\" (1.905 m)   Wt 180 lb (81.6 kg)   SpO2 95%   BMI 22.50 kg/m²   Temp (24hrs), Av.7 °F (36.5 °C), Min:97.5 °F (36.4 °C), Max:97.9 °F (36.6 °C)    No results for input(s): POCGLU in the last 72 hours.     Intake/Output Summary (Last 24 hours) at 2020  Last data filed at 2020 1758  Gross per 24 hour   Intake --   Output 220 ml   Net -220 ml       Physical Exam  Vitals signs and nursing note reviewed. Constitutional:       General: He is not in acute distress. Appearance: He is ill-appearing. He is not toxic-appearing or diaphoretic. HENT:      Head: Normocephalic and atraumatic. Right Ear: External ear normal.      Left Ear: External ear normal.      Nose: Nose normal. No congestion or rhinorrhea. Mouth/Throat:      Mouth: Mucous membranes are dry. Pharynx: Oropharynx is clear. Eyes:      Extraocular Movements: Extraocular movements intact. Conjunctiva/sclera: Conjunctivae normal.      Pupils: Pupils are equal, round, and reactive to light. Neck:      Musculoskeletal: Normal range of motion and neck supple. No neck rigidity or muscular tenderness. Cardiovascular:      Rate and Rhythm: Normal rate. Rhythm irregular. Pulses: Normal pulses. Heart sounds: Normal heart sounds. No murmur. No friction rub. No gallop. Pulmonary:      Effort: Pulmonary effort is normal. No respiratory distress. Breath sounds: No stridor. Rhonchi present. No wheezing. Abdominal:      General: Bowel sounds are normal. There is no distension. Palpations: Abdomen is soft. There is no mass. Tenderness: There is no abdominal tenderness. Musculoskeletal: Normal range of motion. Right lower leg: No edema. Left lower leg: No edema. Skin:     General: Skin is warm and dry. Capillary Refill: Capillary refill takes less than 2 seconds. Coloration: Skin is not jaundiced. Findings: No bruising, erythema or lesion. Neurological:      General: No focal deficit present. Mental Status: He is alert and oriented to person, place, and time. Mental status is at baseline. Cranial Nerves: No cranial nerve deficit. Sensory: No sensory deficit. Motor: No weakness.    Psychiatric:      Comments: Underlying dementia, oriented to self only         Investigations:      Laboratory Testing:  Recent Results (from the past 24 hour(s))   CBC Auto Differential    Collection Time: 12/25/20 11:30 AM   Result Value Ref Range    WBC 21.6 (H) 3.5 - 11.0 k/uL    RBC 3.72 (L) 4.5 - 5.9 m/uL    Hemoglobin 10.8 (L) 13.5 - 17.5 g/dL    Hematocrit 33.7 (L) 41 - 53 %    MCV 90.4 80 - 100 fL    MCH 29.0 26 - 34 pg    MCHC 32.1 31 - 37 g/dL    RDW 19.6 (H) 12.5 - 15.4 %    Platelets 034 939 - 354 k/uL    MPV 8.2 6.0 - 12.0 fL    NRBC Automated NOT REPORTED per 100 WBC    Differential Type NOT REPORTED     Seg Neutrophils 90 (H) 36 - 66 %    Lymphocytes 7 (L) 24 - 44 %    Monocytes 3 2 - 11 %    Eosinophils % 0 (L) 1 - 4 %    Basophils 0 0 - 2 %    Immature Granulocytes NOT REPORTED 0 %    Segs Absolute 19.40 (H) 1.8 - 7.7 k/uL    Absolute Lymph # 1.40 1.0 - 4.8 k/uL    Absolute Mono # 0.70 0.1 - 1.2 k/uL    Absolute Eos # 0.00 0.0 - 0.4 k/uL    Basophils Absolute 0.00 0.0 - 0.2 k/uL    Absolute Immature Granulocyte NOT REPORTED 0.00 - 0.30 k/uL    WBC Morphology NOT REPORTED     RBC Morphology NOT REPORTED     Platelet Estimate NOT REPORTED    Comprehensive Metabolic Panel w/ Reflex to MG    Collection Time: 12/25/20 11:30 AM   Result Value Ref Range    Glucose 114 (H) 70 - 99 mg/dL    BUN 24 (H) 8 - 23 mg/dL    CREATININE 0.88 0.70 - 1.20 mg/dL    Bun/Cre Ratio NOT REPORTED 9 - 20    Calcium 9.2 8.6 - 10.4 mg/dL    Sodium 143 135 - 144 mmol/L    Potassium 3.7 3.7 - 5.3 mmol/L    Chloride 102 98 - 107 mmol/L    CO2 32 (H) 20 - 31 mmol/L    Anion Gap 9 9 - 17 mmol/L    Alkaline Phosphatase 90 40 - 129 U/L    ALT 28 5 - 41 U/L    AST 20 <40 U/L    Total Bilirubin 0.63 0.3 - 1.2 mg/dL    Total Protein 6.4 6.4 - 8.3 g/dL    Alb 3.1 (L) 3.5 - 5.2 g/dL    Albumin/Globulin Ratio 0.9 (L) 1.0 - 2.5    GFR Non-African American >60 >60 mL/min    GFR African American >60 >60 mL/min    GFR Comment          GFR Staging NOT REPORTED    Troponin    Collection Time: 12/25/20 11:30 AM   Result Value Ref Range    Troponin, High Sensitivity 34 (H) 0 - 22 ng/L    Troponin T NOT REPORTED <0.03 ng/mL    Troponin Interp NOT REPORTED    Brain Natriuretic Peptide    Collection Time: 12/25/20 11:30 AM   Result Value Ref Range    Pro- (H) <300 pg/mL    BNP Interpretation Pro-BNP Reference Range:    Protime-INR    Collection Time: 12/25/20 11:30 AM   Result Value Ref Range    Protime 19.9 (H) 9.4 - 12.6 sec    INR 1.9    APTT    Collection Time: 12/25/20 11:30 AM   Result Value Ref Range    PTT 37.4 (H) 21.3 - 31.3 sec   D-Dimer, Quantitative    Collection Time: 12/25/20 11:30 AM   Result Value Ref Range    D-Dimer, Quant 1.04 mg/L FEU   Lactate, Sepsis    Collection Time: 12/25/20 11:30 AM   Result Value Ref Range    Lactic Acid, Sepsis 1.9 0.5 - 1.9 mmol/L    Lactic Acid, Sepsis, Whole Blood NOT REPORTED 0.5 - 1.9 mmol/L   Lactate, Sepsis    Collection Time: 12/25/20  2:00 PM   Result Value Ref Range    Lactic Acid, Sepsis 1.1 0.5 - 1.9 mmol/L    Lactic Acid, Sepsis, Whole Blood NOT REPORTED 0.5 - 1.9 mmol/L   Troponin    Collection Time: 12/25/20  2:00 PM   Result Value Ref Range    Troponin, High Sensitivity 32 (H) 0 - 22 ng/L    Troponin T NOT REPORTED <0.03 ng/mL    Troponin Interp NOT REPORTED    COVID-19    Collection Time: 12/25/20  2:30 PM    Specimen: Other   Result Value Ref Range    SARS-CoV-2          SARS-CoV-2, Rapid Not Detected Not Detected    Source . NASOPHARYNGEAL SWAB     SARS-CoV-2             Imaging/Diagnostics:  Xr Chest Portable    Result Date: 12/25/2020  Subtle patchy bilateral lung opacities increased from the prior study are likely on the basis of pneumonia. Ct Chest Pulmonary Embolism W Contrast    Result Date: 12/25/2020  No central or segmental pulmonary embolus. Bilateral lower lobe consolidation and scattered bilateral ground-glass nodular opacities are likely on the basis of pneumonia. Recommend follow-up.        Assessment :      Hospital Problems Last Modified POA    * (Principal) Pneumonia 12/25/2020 Yes    Hypertension 12/25/2020 Yes    Hypothyroidism 12/25/2020 Yes    Benign prostatic hyperplasia 12/25/2020 Yes    Paroxysmal atrial fibrillation ([facility] [address] 12/25/2020 Yes    Chronic anticoagulation 12/25/2020 Yes          Plan:     Patient status inpatient in the Med/Surge    1. Pneumonia: worsened from discharge with increased O2 requirements, continue IV antibiotics, follow blood cultures, and collect sputum cultures, oxygen supplementation as need (4L [place] oxygen is baseline), and scheduled duonebs. Continuous pulse ox and continuous telemetry, check lactic acid now  2. PAF chronically anticoagulated with warfarin: INR: 1.9 (reported from [facility] to be 19.9 and subsequently given Vit. K?), rate controlled with digoxin and cardizem, pharmacy to dose warfarin, continuous telemetry and EKG prn  3. HTN: no hypotension at present  4. BPH: on finasteride and tamsulosin  5. Follow daily labs, check lactic acid, resume selected home medications, and resume dental soft diet. 6. RN to eval swallow ability prior to starting diet, encouraged to use small bites and sitting upright and forward to decrease risk of aspiration  7. DVT prophylaxis: chronic AC with warfarin, PTD  8. FULL CODE    Consultations:   None    Patient is admitted as inpatient status because of co-morbidities listed above, severity of signs and symptoms as outlined, requirement for current medical therapies and most importantly because of direct risk to patient if care not provided in a hospital setting. Expected length of stay > 48 hours.     VAIBHAV Cai NP  12/25/2020  9:05 PM    Copy sent to Dr. Aliyah Stauffer MD

## 2020-12-26 NOTE — PROGRESS NOTES
Pt's bp 76/40 with pulse of 69. Roni Vera NP notified. Orders received. Writer will continue to monitor.

## 2020-12-26 NOTE — PROGRESS NOTES
Pressure wound to coccyx and wound to R arm present upon admission. [place] NP notified, consult placed for wound care.

## 2020-12-26 NOTE — PROGRESS NOTES
Pharmacy Note  Warfarin Consult follow-up      Recent Labs     12/26/20  0410   INR 2.1     Recent Labs     12/25/20  1130 12/26/20  0410   HGB 10.8* 9.0*   HCT 33.7* 31.3*    250       Significant Drug-Drug Interactions:  New warfarin drug-drug interactions: Levaquin   Discontinued drug-drug interactions: Cipro  Current warfarin drug-drug interactions: Synthroid, Zosyn    Date INR Dose   12/25 2.1 None   12/26 2.1 2 mg        Notes:                   INR therapeutic. Will order warfarin 2 mg today and continue to monitor daily PT/INR while inpatient. Manuel Hawley, Pharm. D.   PGY-1 Pharmacy Practice Resident   12/26/2020 1:44 PM

## 2020-12-26 NOTE — FLOWSHEET NOTE
Assessment: Patient was sitting up in his bed when  visited. Family was not present at the time. When asked about family, patient responded; \"I have no family. \" Patient looked a bit worried and seemed to be having a rough time. Patient was open to prayer but did not seem to be affiliated with any Confucianism. Intervention:  maintained presence, offered support, prayed with patient and reassured him that he was in good hands. Outcome: Patient expressed appreciation for the spiritual support he received. Follow up visits recommended for ongoing assessment of patient's condition and for more prayers and support. 12/26/20 1019   Encounter Summary   Services provided to: Patient   Support System Children   Continue Visiting   (12/26/2020)   Complexity of Encounter Moderate   Length of Encounter 15 minutes   Spiritual Assessment Completed Yes   Routine   Type Initial   Assessment Calm; Approachable;Coping   Intervention Active listening;Prayer;[place]   Outcome Expressed gratitude   Spiritual/Rastafari   Type Spiritual support

## 2020-12-26 NOTE — PROGRESS NOTES
Physical Therapy    Facility/Department: [address] ORTHO/MED SURG  Initial Assessment    NAME: Ignacio Perez  : 1935  MRN: [id number]    Chief Complaint   Patient presents with    Respiratory Distress       Date of Service: 2020    Discharge Recommendations:    Further therapy recommended at discharge. PT Equipment Recommendations  Other: TBD    Assessment   Body structures, Functions, Activity limitations: Decreased functional mobility ; Decreased strength;Decreased endurance;Decreased balance  Assessment: Pt grossly CGA for bed mobility. Pt declined any standing or sitting - to - supine, adamantly. Pt would benefit from continued acute PT to address deficits. Prognosis: Good  Decision Making: Medium Complexity  PT Education: PT Role;Plan of Care;General Safety  REQUIRES PT FOLLOW UP: Yes  Activity Tolerance  Activity Tolerance: Treatment limited secondary to agitation  Activity Tolerance: Pt declined any transfers or returning supine after EOB. Patient Diagnosis(es): The primary encounter diagnosis was Pneumonia due to organism. Diagnoses of Warfarin-induced coagulopathy ([address] and Elevated troponin were also pertinent to this visit. has a past medical history of Atherosclerosis, Benign prostatic hyperplasia, Hypertension, Hypothyroidism, Osteoporosis, Paroxysmal A-fib ([address] Pneumonia, Rhabdomyolysis, Smoking, Spondylolisthesis, and Vitamin D deficiency. has a past surgical history that includes Ankle surgery; hip surgery; and Tonsillectomy and adenoidectomy.     Restrictions  Restrictions/Precautions  Restrictions/Precautions: General Precautions, Fall Risk, Up as Tolerated  Required Braces or Orthoses?: No  Vision/Hearing  Vision: Impaired  Vision Exceptions: Wears glasses at all times  Hearing: Within functional limits     Subjective  General  Chart Reviewed: Yes  Patient assessed for rehabilitation services?: Yes  Response To Previous Treatment: Not applicable  Family / Caregiver Present: No  Follows Commands: Within Functional Limits  General Comment  Comments: OT co-eval  Subjective  Subjective: RN and pt agreeable to PT. Pt alert in bed upon arrival.  Pain Screening  Patient Currently in Pain: Denies  Vital Signs  Patient Currently in Pain: Denies  Pre Treatment Pain Screening  Intervention List: Patient able to continue with treatment    Orientation  Orientation  Overall Orientation Status: Impaired  Orientation Level: Oriented to person  Social/Functional History  Social/Functional History  Lives With: Alone  Type of Home: House  Home Layout: Two level, Bed/Bath upstairs(also bathroom main floor)  Home Access: Stairs to enter without rails  Entrance Stairs - Number of Steps: 1  Bathroom Shower/Tub: Tub/Shower unit  Bathroom Equipment: Grab bars in shower, Shower chair  Bathroom Accessibility: Accessible  Home Equipment: Rolling walker, Quad cane  Receives Help From: Home health(1x/wk)  ADL Assistance: Independent  Homemaking Assistance: Independent(aide does laundry)  Homemaking Responsibilities: Yes  Ambulation Assistance: Independent(no AD used)  Active : Yes  Mode of Transportation: Truck  Leisure & Hobbies: TV  Additional Comments: Pt amb at [employer]. Pt is a poor historian. Chart states pt is from a SNF ([address] [place] [address])  Cognition        Objective          AROM RLE (degrees)  RLE AROM: WFL  AROM LLE (degrees)  LLE AROM : WFL  AROM RUE (degrees)  RUE AROM : WFL  AROM LUE (degrees)  LUE AROM : WFL  Strength RLE  Strength RLE: WFL  Comment: 4-  Strength LLE  Strength LLE: WFL  Comment: 4-  Strength RUE  Strength RUE: WFL  Strength LUE  Strength LUE: WFL     Sensation  Overall Sensation Status: WFL(Pt denies any numbenss or tingling)  Bed mobility  Supine to Sit: Contact guard assistance  Sit to Supine: Unable to assess(Pt adamently refused to return supine after session, multipel attempts. bed alarm set.  RN notified.)  Transfers  Comment: pt adamently declined any standing, because \"I don't want to\"  Ambulation  Ambulation?: No  Stairs/Curb  Stairs?: No     Balance  Posture: Fair  Sitting - Static: Good  Sitting - Dynamic: Good;-        Plan   Plan  Times per week: 4- 5x/wk  Times per day: Daily  Current Treatment Recommendations: Strengthening, Endurance Training, Functional Mobility Training, Transfer Training, Gait Training, Balance Training, Stair training, Home Exercise Program, Safety Education & Training, Patient/Caregiver Education & Training, Equipment Evaluation, Education, & procurement  Safety Devices  Type of devices: Call light within reach, Bed alarm in place, Left in bed, Patient at risk for falls, Gait belt, All fall risk precautions in place, Nurse notified(Pt left EOB, tray table in front of pt. Pt adamantly refusing to return supine.  RN notified.)  Restraints  Initially in place: No    AM-PAC Score  AM-PAC Inpatient Mobility Raw Score : 13 (12/26/20 135)  AM-PAC Inpatient T-Scale Score : 36.74 (12/26/20 [facility])  Mobility Inpatient CMS 0-100% Score: 64.91 (12/26/20 [facility])  Mobility Inpatient CMS G-Code Modifier : CL (12/26/20 [facility])          Goals  Short term goals  Time Frame for Short term goals: 14 visits  Short term goal 1: Pt will be In[place] bed mobility  Short term goal 2: Pt will be [place] transfers  Short term goal 3: Pt will be In[place] amb 240' RW or least restrictive AD  Short term goal 4: Pt will navigate 2 steps Inessa       Therapy Time   Individual Concurrent Group Co-treatment   Time In 0953         Time Out 1011         Minutes 18         Timed Code Treatment Minutes: 8 Minutes       Marek Alatorre PT

## 2020-12-26 NOTE — PROGRESS NOTES
Patient not directable with Telesitter, Pulling off all monitor lines. Sitting @ side of bed, refuses to get in bed. Staffing called for bedside 1:1 sitter.

## 2020-12-26 NOTE — PROGRESS NOTES
Occupational Therapy   Occupational Therapy Initial Assessment  Date: 2020   Patient Name: Ricky Escalera  MRN: [id number]     : 1935    Date of Service: 2020    Discharge Recommendations:  Patient would benefit from continued therapy after discharge       Assessment   Performance deficits / Impairments: Decreased functional mobility ; Decreased ADL status; Decreased strength;Decreased safe awareness;Decreased cognition;Decreased endurance;Decreased balance;Decreased high-level IADLs  Assessment: Pt intially agreeable to session. Pt demo supine > sit transfer with CGA. Pt demo good static and dynamic sitting this date for ~10 minutes. Pt grew increasingly agitated throughout session and when asked to complete tasks. Pt refused tranfers/mobility and refused to return to supine. Pt able to be redirected from agitation at times with random questions. Pt left seated EOB, tray table in front of pt, call light within reach, bed alarm on, and RN notified. Pt would be unsafe to attempt functional transfers/mobility without assistance at this time d/t cognitive status. Pt will require continued OT services to increase safety and independnece with ADLs/IADLs  Prognosis: Fair  Decision Making: Medium Complexity  Patient Education: Pt ed on OT role, OT POC, safety awareness, reorientation, importance of continued OT, and importance of OOB activity. Pt with poor return d/t agitation and decreased cog  REQUIRES OT FOLLOW UP: Yes  Activity Tolerance  Activity Tolerance: Treatment limited secondary to agitation  Activity Tolerance: pt intially cooperative however became agitated throughout session which limited session greatly  Safety Devices  Safety Devices in place: Yes  Type of devices: Nurse notified; Left in bed;Gait belt;Call light within reach; Bed alarm in place(telesitter present)  Restraints  Initially in place: No           Patient Diagnosis(es): The primary encounter diagnosis was Pneumonia due to organism. Diagnoses of Warfarin-induced coagulopathy ([facility] and Elevated troponin were also pertinent to this visit. has a past medical history of Atherosclerosis, Benign prostatic hyperplasia, Hypertension, Hypothyroidism, Osteoporosis, Paroxysmal A-fib ([address] Pneumonia, Rhabdomyolysis, Smoking, Spondylolisthesis, and Vitamin D deficiency. has a past surgical history that includes Ankle surgery; hip surgery; and Tonsillectomy and adenoidectomy. Restrictions  Restrictions/Precautions  Restrictions/Precautions: Up as Tolerated, Fall Risk  Required Braces or Orthoses?: No    Subjective   General  Patient assessed for rehabilitation services?: Yes  Family / Caregiver Present: No  General Comment  Comments: RN ok'd pt for therapy eval. Pt agreeable to session however became agitated and refused majority of activity. Social/Functional History  Social/Functional History  Lives With: Alone  Type of Home: House  Home Layout: Two level, Bed/Bath upstairs(also bathroom main floor)  Home Access: Stairs to enter without rails  Entrance Stairs - Number of Steps: 1  Bathroom Shower/Tub: Tub/Shower unit  Bathroom Equipment: Grab bars in shower, Shower chair  Bathroom Accessibility: Accessible  Home Equipment: Rolling walker, Quad cane  Receives Help From: Home health(1x/wk)  ADL Assistance: Independent  Homemaking Assistance: Independent(aide does laundry)  Homemaking Responsibilities: Yes  Ambulation Assistance: Independent(no AD used)  Active : Yes  Mode of Transportation: Truck  Leisure & Hobbies: TV  Additional Comments: Pt amb at grocery. Pt is a poor historian. Chart states pt is from a SNF ([address] [place] [address])       Objective   Vision: Impaired  Vision Exceptions: Wears glasses at all times  Hearing: Within functional limits      Balance  Sitting Balance: Stand by assistance(pt demo good static and dynamic sitting activity ~10 minutes.  Pt sat EOB for soc/functional hx questions)  Standing Balance: Unable to assess(comment)  Functional Mobility  Functional Mobility Comments: Pt refused     ADL  Feeding: Contact guard assistance;Setup;Verbal cueing; Increased time to complete  Grooming: Contact guard assistance;Setup;Verbal cueing; Increased time to complete  UE Bathing: Minimal assistance;Setup;Verbal cueing; Increased time to complete  LE Bathing: Moderate assistance;Setup;Verbal cueing; Increased time to complete  UE Dressing: Minimal assistance;Setup;Verbal cueing; Increased time to complete  LE Dressing: Moderate assistance;Setup;Verbal cueing; Increased time to complete(Mod A to don B socks)  Toileting: Moderate assistance;Setup; Increased time to complete  Tone RUE  RUE Tone: Normotonic  Tone LUE  LUE Tone: Normotonic  Coordination  Movements Are Fluid And Coordinated: Yes    Bed mobility  Supine to Sit: Contact guard assistance  Sit to Supine: Unable to assess  Comment: pt refused to return to supine at end of session despite max encouragement and education. RN notified and okay'd pt to remain sitting EOB. Bed alarm on and tray table in front of pt at end of session  Transfers  Sit to stand: Unable to assess  Stand to sit: Unable to assess  Transfer Comments: pt refused transfer despite max education and encouragement    Cognition  Overall Cognitive Status: Exceptions  Arousal/Alertness: Delayed responses to stimuli  Following Commands: Follows one step commands with repetition; Follows one step commands with increased time  Attention Span: Attends with cues to redirect  Safety Judgement: Decreased awareness of need for safety;Decreased awareness of need for assistance  Problem Solving: Assistance required to correct errors made;Assistance required to identify errors made  Insights: Decreased awareness of deficits  Initiation: Requires cues for some  Sequencing: Requires cues for some    Sensation  Overall Sensation Status: WFL(pt denies numbness/tingling at this time)        LUE AROM (degrees)  LUE AROM : WFL  Left Hand AROM (degrees)  Left Hand AROM: WFL  RUE AROM (degrees)  RUE AROM : WFL  Right Hand AROM (degrees)  Right Hand AROM: WFL  LUE Strength  Gross LUE Strength: WFL  L Hand General: 4/5  LUE Strength Comment: grossly 4/5  RUE Strength  Gross RUE Strength: WFL  R Hand General: 4/5  RUE Strength Comment: grossly 4/5                   Plan   Plan  Times per week: 3-4 x/wk  Current Treatment Recommendations: Balance Training, Functional Mobility Training, Endurance Training, Safety Education & Training, Self-Care / ADL, Equipment Evaluation, Education, & procurement, Patient/Caregiver Education & Training, Cognitive Reorientation    AM-PAC Score        AM-PAC Inpatient Daily Activity Raw Score: 15 (12/26/20 1325)  AM-PAC Inpatient ADL T-Scale Score : 34.69 (12/26/20 1325)  ADL Inpatient CMS 0-100% Score: 56.46 (12/26/20 1325)  ADL Inpatient CMS G-Code Modifier : CK (12/26/20 1325)    Goals  Short term goals  Time Frame for Short term goals: By discharge, pt will:  Short term goal 1: Demo Min A for functional tranfers and functional mobility with use of LRD PRN  Short term goal 2: Demo I with setup for UB ADLs and grooming tasks  Short term goal 3: Demo CGA with setup for LB ADLs and toileting tasks  Short term goal 4: Demo 10+ minutes dynamic standing task to increase safety and independence with ADLs/IADLs  Short term goal 5: Follow 75% commands to increase participation with ADLs/IADLs  Short term goal 6: Demo good safety awareness throughout therapy session with <5 VCs       Therapy Time   Individual Concurrent Group Co-treatment   Time In 0952         Time Out 1011         Minutes 19         Timed Code Treatment Minutes: 8 Minutes       Ariela Judd OTR/L

## 2020-12-26 NOTE — CONSULTS
[facility] - Initial Consult Note  Today's Date and Time: 12/26/2020, 1:13 PM    Impression :   · AFib (chronically anticoagulated with warfarin)  · HTN  · BPH  · Hypothyroidism  · Dementia   · Pressure wound on coccyx. · Worsening pneumonia  · Single blood culture with Coagulase negative Staphylococci on 12/15/2020  · Covid tests:  · 12/14/2020 -negative  · 12/25/2020 negative    Recommendations:   · Discontinue vancomycin, Zosyn, Levaquin  · Ceftriaxone 2 g IV every 24 hours  · Monitor clinical response    Medical Decision Making/Summary/Discussion:12/26/2020     ·   Infection Control Recommendations   · [place] Precautions  · Contact Isolation MRSA    Antimicrobial Stewardship Recommendations     · Simplification of therapy    Coordination of Outpatient Care:   · Estimated Length of IV antimicrobials:TBD  · Patient will need Midline Catheter Insertion: No  · Patient will need PICC line Insertion:No  · Patient will need: Home IV , [place],  SNF,  LTAC:TBD  · Patient will need outpatient wound care:Yes    Chief complaint/reason for consultation:   · Pneumonia  · Sacral decubiti    History of Present Illness:   Rachel Elaine is a 82y.o.-year-old white male who was initially admitted on 12/25/2020. Patient seen at the request of Dr. Bart Smith DO.    INITIAL HISTORY:    This is a patient with AFib (chronically anticoagulated with warfarin), HTN, BPH, hypothyroidism, dementia who presents from [facility] ER with shortness of breath. Patient is a poor historian, thus most information is collected from EMR. EMS was called from patient's nursing facility because of increased shortness of breath. Patient uses 4 L oxygen at home,  he was saturating  85% on 4L. Patient was recently discharged from hospital on 12/18/2020 after treatment for pneumonia and supra therapeutic INR. Patient was discharged on PO doxycycline.   [facility]. WBC elevated at 21.6>20.6, lactic acid 3.5,Trop: 32>30>29, pro-bnp: 488 D-Dimer: 1.04. CT Chest negative for PE and COVID-19 swab negative. Patient was given 1 dose of IV Zosyn and Cipro in the emergency room   Patient was transferred to [facility]. Vs for continued management of Pneumonia.      On my evaluation,    Patient is awake and oriented to self only ,ill appearing, agitated , has sitter by bedside. Afebrile, labile BP, 90s /40s, using 5 L NC (4 L at home)   He is unable to provide any additional history. At present, denies any fever, chills, chest pain or SOB. He does endorse coarse non productive cough. Denies ABD pain,N/V/D. Patient had been placed on Zosyn, Levaquin and Vancomycin. We will plan to simplify such treatment. We will place the patient on ceftriaxone alone. CXR 12/17/20  Subtle patchy bilateral lung opacities increased from the prior study are   likely on the basis of pneumonia     CXR 12/26/20  compatible with pneumonia and   some basilar atelectasis/consolidation. CT chest 12/25/20  Bilateral lower lobe consolidation and scattered bilateral ground-glass   nodular opacities are likely on the basis of pneumonia       Labs, X rays reviewed: 12/26/2020    BUN:25  Cr:1.16    WBC:21.6>20.6  Hb:10.8>9  Plat: 339>250    Cultures:  Urine:  ·   Blood: 12/25/20- no growth till date. ·   Sputum :  ·   Wound:  ·     Discussed with patient, RN, IM. I have personally reviewed the past medical history, past surgical history, medications, social history, and family history, and I have updated the database accordingly.   Past Medical History:     Past Medical History:   Diagnosis Date    Atherosclerosis     Benign prostatic hyperplasia     Hypertension     Hypothyroidism     Osteoporosis     Paroxysmal A-fib ([address]     Pneumonia     Rhabdomyolysis     Smoking     Spondylolisthesis     Vitamin D deficiency        Past Surgical  History: Fear of current or ex partner: Not on file     Emotionally abused: Not on file     Physically abused: Not on file     Forced sexual activity: Not on file   Other Topics Concern    Not on file   Social History Narrative    Not on file       Family History:   History reviewed. No pertinent family history. Allergies:   Patient has no known allergies. Review of Systems:   Unable to obtain much history from patient. Physical Examination :     Patient Vitals for the past 8 hrs:   BP Temp Temp src Pulse Resp SpO2   12/26/20 0826 -- -- -- -- -- 93 %   12/26/20 0825 (!) 90/49 98.6 °F (37 °C) Oral 69 20 95 %   12/26/20 0822 -- -- -- -- -- (!) 89 %   12/26/20 0710 -- -- -- 78 -- --     General Appearance: Awake, alert, oriented to self only, and ill appearing. Head:  Normocephalic, no trauma  Eyes: Pupils equal, round, reactive to light and accommodation; extraocular movements intact; sclera anicteric; conjunctivae pink. No embolic phenomena. ENT: Oropharynx clear, without erythema, exudate, or thrush. No tenderness of sinuses. Mouth/throat: mucosa pink and moist. No lesions. Dentition in good repair. Neck:Supple, without lymphadenopathy. Thyroid normal, No bruits. Pulmonary/Chest: Clear to auscultation, has crackles and sounds wheezy on auscultation. No dullness to percussion. Cardiovascular: irregular rhythm without murmurs, rubs, or gallops. Abdomen: Soft, non tender. Bowel sounds normal. No organomegaly  All four Extremities: No cyanosis, clubbing, edema, or effusions. Neurologic: No gross sensory or motor deficits. Skin: Warm and dry with good turgor. No signs of peripheral arterial or venous insufficiency. No ulcerations. No open wounds.     Medical Decision Making -Laboratory:   I have independently reviewed/ordered the following labs:    CBC with Differential:   Recent Labs     12/25/20  1130 12/26/20  0410   WBC 21.6* 20.6*   HGB 10.8* 9.0*   HCT 33.7* 31.3*    250   LYMPHOPCT 7*  -- MONOPCT 3  --      BMP:   Recent Labs     20  1130 20  0410    133*   K 3.7 3.9    101   CO2 32* 27   BUN 24* 25*   CREATININE 0.88 1.16     Hepatic Function Panel:   Recent Labs     20  1130   PROT 6.4   LABALBU 3.1*   BILITOT 0.63   ALKPHOS 90   ALT 28   AST 20     No results for input(s): RPR in the last 72 hours. No results for input(s): HIV in the last 72 hours. No results for input(s): BC in the last 72 hours. Lab Results   Component Value Date    MUCUS NOT REPORTED 2020    RBC 3.10 2020    TRICHOMONAS NOT REPORTED 2020    WBC 20.6 2020    YEAST NOT REPORTED 2020    TURBIDITY CLEAR 2020     Lab Results   Component Value Date    CREATININE 1.16 2020    GLUCOSE 104 2020       Medical Decision Making-Imagin/26/20    Medical Decision [id number]:       Medical Decision Making-Other:     Note:  · Labs, medications, radiologic studies were reviewed with personal review of films  · Large amounts of data were reviewed  · Discussed with nursing Staff, Discharge planner  · Infection Control and Prevention measures reviewed  · All prior entries were reviewed  · Administer medications as ordered  · Prognosis: Guarded  · Discharge planning reviewed  · Follow up as outpatient. Thank you for allowing us to participate in the care of this patient. Please call with questions. Licha Ramires MD     ATTESTATION:    I have discussed the case, including pertinent history and exam findings with the residents and students. I have seen and examined the patient and the key elements of the encounter have been performed by me. I was present when the student obtained his information or examined the patient. I have reviewed the laboratory data, other diagnostic studies and discussed them with the residents. I have updated the medical record where necessary.     I agree with the assessment, plan and orders as documented by the resident/ student.     Anais Penaloza MD.

## 2020-12-26 NOTE — PROGRESS NOTES
Pharmacy Note  Warfarin Consult    Leyla Baugh is a 82 y.o. male for whom pharmacy has been consulted to manage warfarin therapy. Consulting Physician: Clarisa Huggins  Reason for Admission: Respiratory distress    Warfarin dose prior to admission: 2mg daily  Warfarin indication: A.fib  Target INR range: 2-3    Past Medical History:   Diagnosis Date    Atherosclerosis     Benign prostatic hyperplasia     Hypertension     Hypothyroidism     Osteoporosis     Paroxysmal A-fib (HCC)     Pneumonia     Rhabdomyolysis     Smoking     Spondylolisthesis     Vitamin D deficiency                 Recent Labs     12/25/20  2224   INR 2.1     Recent Labs     12/25/20  1130   HGB 10.8*   HCT 33.7*          Current warfarin drug-drug interactions: ciprofloxacin, levothyroxine, piperacillin-tazobactam      Date             INR        Dose   12/26/2020       2.1        Hold. Repeat INR with 12/26 AM labs    INR 1.9 at 1130 on 12/25. Vitamin K 10mg was given at 1230 on 12/25. Repeat INR 2.1 at 2230 on 12/25. Daily PT/INR while inpatient. PT/INR ordered to start 12/26/2020. Thank you for the consult. Will continue to follow.     Jus Tim, PharmD

## 2020-12-27 PROBLEM — J96.01 ACUTE RESPIRATORY FAILURE WITH HYPOXIA (HCC): Status: ACTIVE | Noted: 2020-01-01

## 2020-12-27 NOTE — PROGRESS NOTES
[facility]  Office: [address], DO, Ho Dear, DO, Zara Spencer, DO, Concepcion Reeder Blood, DO, Leeanna Cueva MD, Mariano Bear MD, Jia Morel MD, Addy Parmar MD, Zenia Macdonald MD, Carla Bamberger, MD, Minoo Lisa MD, Abundio Danielle MD, Leandra Nielsen MD, Paula Souza, DO, Charanjit Trevizo MD, Anya Rhodes MD, Christine López DO, Joann Benavides MD,  Saadia Irwin, DO, Camille House MD, Leon Martinez MD, [facility], [facility], [facility] Dominguez, CNP, Ant Ram CNP, Loree Pascal, CNS, Ev Back, CNP, Rhianna Murdock, CNP, Hao Su, CNP, Jeremias Mcdowell, CNP, Juliann Bonilla, CNP, Parul Ho PA-C, Adiel Miller [facility], Rajan Mckeon, CNP, Pedro Lassiter, CNP, Mary Jo Bolden, CNP, Job Moscoso, CNP, aBrbie Barrios, [address] [place] [address]    Progress Note    12/27/2020    4:09 PM    Name:   Kaylee Amador  MRN:     [id number]     Acct:      [de-identified]   Room:   [facility]0236-01   Day:  2  Admit Date:  12/25/2020 11:29 AM    PCP:   Sylvester Chavez MD  Code Status:  Full Code    Subjective:     C/C:   Chief Complaint   Patient presents with    Respiratory Distress     Interval History Status: not changed. Patient seen at bedside. Pt more hypoxic today, called to bedside by nurse after SPO2 dropped to 85% on 6L NC. Spoke with daughter at bedside, pt has been bouncing in and out of hospitals over the last 3 months being treated for recurrent pneumonia. Pt is more alert today. Review of Systems:     Constitutional: Denies any fatigue or weakness  Cardiac: Admits to some SOB denies chest pain or palpations  Pulm: Denies any wheezing, admits to a productive cough  GI: Denies abdominal pain, nausea, vomiting  MSK: denies any weakness, numbness or tingling  Psych: denies any suicidal ideations. Medications:      Allergies:  No Known Allergies    Current Meds:   Scheduled Meds:    dextromethorphan-guaiFENesin  10 mL Oral 4 times per day    cefepime  2 g Intravenous Q12H    warfarin  5 mg Oral Once    warfarin (COUMADIN) daily dosing (placeholder)   Other RX Placeholder    multivitamin  1 tablet Oral Daily    midodrine  5 mg Oral TID WC    sodium chloride flush  10 mL Intravenous 2 times per day    ipratropium-albuterol  1 ampule Inhalation Q4H WA    digoxin  125 mcg Oral Daily    dilTIAZem  120 mg Oral Daily    finasteride  5 mg Oral Daily    levothyroxine  75 mcg Oral Daily    tamsulosin  0.8 mg Oral Nightly     Continuous Infusions:     PRN Meds: nicotine, promethazine **OR** ondansetron, magnesium hydroxide, acetaminophen **OR** acetaminophen, albuterol    Data:     Past Medical History:   has a past medical history of Atherosclerosis, Benign prostatic hyperplasia, Hypertension, Hypothyroidism, Osteoporosis, Paroxysmal A-fib ([address] Pneumonia, Rhabdomyolysis, Smoking, Spondylolisthesis, and Vitamin D deficiency. Social History:   reports that he has never smoked. He has never used smokeless tobacco. He reports that he does not drink alcohol or use drugs. Family History: History reviewed. No pertinent family history. Vitals:  BP (!) 112/52   Pulse 85   Temp 97.8 °F (36.6 °C) (Oral)   Resp 24   Ht 6' 3\" (1.905 m)   Wt 180 lb (81.6 kg)   SpO2 92%   BMI 22.50 kg/m²   Temp (24hrs), Av.1 °F (36.7 °C), Min:97.8 °F (36.6 °C), Max:98.3 °F (36.8 °C)    No results for input(s): POCGLU in the last 72 hours. I/O (24Hr):     Intake/Output Summary (Last 24 hours) at 2020 1609  Last data filed at 2020 1548  Gross per 24 hour   Intake 400 ml   Output 2600 ml   Net -2200 ml       Labs:  Hematology:  Recent Labs     20  1130 20  2224 20  0410 20  0538   WBC 21.6*  --  20.6* 16.8*   RBC 3.72*  --  3.10* 2.90*   HGB 10.8*  --  9.0* 8.5*   HCT 33.7*  --  31.3* 27.9*   MCV 90.4  --  101.0 96.2   MCH 29.0  --  29.0 29.3   MCHC 32.1  --  28.8 30.5   RDW 19.6*  --  20.1* 19.6*     --  250 227   MPV 8.2  --  11.0 10.2   INR 1.9 2.1 2.1 1.4   DDIMER 1.04  --   --   --      Chemistry:  Recent Labs     12/25/20  1130 12/25/20  1130 12/25/20  2141 12/26/20  0410 12/26/20  1159 12/26/20  1159 12/26/20  2240 12/27/20  0538 12/27/20  1116     --   --  133* 135  --   --  137  --    K 3.7  --   --  3.9  --   --   --  3.5*  --      --   --  101  --   --   --  104  --    CO2 32*  --   --  27  --   --   --  25  --    GLUCOSE 114*  --   --  104*  --   --   --  100*  --    BUN 24*  --   --  25*  --   --   --  17  --    CREATININE 0.88  --   --  1.16  --   --   --  0.85  --    MG  --   --   --   --   --   --   --  2.1  --    ANIONGAP 9  --   --  5*  --   --   --  8*  --    LABGLOM >60  --   --  >60  --   --   --  >60  --    GFRAA >60  --   --  >60  --   --   --  >60  --    CALCIUM 9.2  --   --  8.4*  --   --   --  8.5*  --    PHOS  --   --   --   --   --   --   --  2.7  --    PROBNP 488*  --   --   --   --   --   --   --   --    TROPHS 34*   < >  --  30* 29*   < > 41* 41* 42*   DIGOXIN  --   --   --  0.8  --   --   --   --   --    LACTACIDWB  --   --  1.7  --   --   --   --   --   --     < > = values in this interval not displayed.      Recent Labs     12/25/20 1130 12/27/20  0538   PROT 6.4  --    LABALBU 3.1* 1.8*   AST 20  --    ALT 28  --    ALKPHOS 90  --    BILITOT 0.63  --      ABG:No results found for: POCPH, PHART, PH, POCPCO2, PJL5CEG, PCO2, POCPO2, PO2ART, PO2, POCHCO3, KXG3YWG, HCO3, NBEA, PBEA, BEART, BE, THGBART, THB, AHV0ORU, NGLI5ZNK, F7QYMHLY, O2SAT, FIO2  Lab Results   Component Value Date/Time    SPECIAL NOT REPORTED 12/26/2020 05:12 PM     Lab Results   Component Value Date/Time    CULTURE PRESUMPTIVE CANDIDA ALBICANS 10 to 50,000 CFU/ML (A) 12/26/2020 05:12 PM       Radiology:  [facility] [place] Chest (2 Vw)    Result Date: 12/26/2020  Similar findings to those seen on recent CT, compatible with pneumonia and some basilar atelectasis/consolidation. Xr Chest Portable    Result Date: 12/25/2020  Subtle patchy bilateral lung opacities increased from the prior study are likely on the basis of pneumonia. Ct Chest Pulmonary Embolism W Contrast    Result Date: 12/25/2020  No central or segmental pulmonary embolus. Bilateral lower lobe consolidation and scattered bilateral ground-glass nodular opacities are likely on the basis of pneumonia. Recommend follow-up. Physical Examination:        General appearance: Chronically ill-appearing male sitting at the side of the bed, appears agitated  Mental Status: Patient not answering any questions appears confused  Lungs:  Course to auscultation the lower lobes bilaterally with diminished breath sounds at the bases. Heart:  regular rate and rhythm, no murmur  Abdomen:  soft, nontender, nondistended, normal bowel sounds, no masses, hepatomegaly, splenomegaly +sacral ulcer  Extremities:  no edema, redness, tenderness in the calves  Skin:  no gross lesions, rashes, induration    Assessment:        Hospital Problems           Last Modified POA    * (Principal) Community acquired bacterial pneumonia 12/26/2020 Yes    Hypertension 12/25/2020 Yes    Hypothyroidism 12/25/2020 Yes    Benign prostatic hyperplasia 12/25/2020 Yes    Paroxysmal atrial fibrillation ([address] 12/25/2020 Yes    Chronic anticoagulation 12/25/2020 Yes    Lactic acidosis 12/26/2020 Yes    Sepsis ([address] 12/26/2020 Yes    Acute respiratory failure with hypoxia ([address] 12/27/2020 Yes          Plan:        1. Sepsis 2/2 Community-acquired pneumonia: Pt more hypoxic today, desaturated to 82 on 6L NC increased to 94% on HFNC. Check CXR  12/27. Received one dose of Lasix 12/27 with good urine output. Plan for additional dose in evening 12/27. Rocephin changed to cefepime 12/27. Pt may benefit from Pulm eval given recurrent episodes of pneumonia to rule out postobstructive pneumonia. 2. Candiduria likely colonization.    3. Continue Cardizem and Coumadin for treatment of atrial fibrillation  4. Continue Synthroid 75 mcg daily  5. Continue daily multivitamin  6. Continue Flomax for treatment of BPH  7. Bladder scan per shift, straight cath for urinary retention greater than 250 cc  8. Avoid any QTC prolonging drugs  9. Hgb did decrease from 10.8-8.5 suspect dilutional component. Continue to monitor hemoglobin, transfuse as needed for symptomatic anemia or hemoglobin less than 7. Will do anemia workup  10. Labs, imaging reviewed.   11. >35 minutes spent at bedside today    Malik Spencer DO  12/27/2020  4:09 PM

## 2020-12-27 NOTE — PROGRESS NOTES
[facility] - Progress Note    Today's Date and Time: 12/27/2020, 9:49 AM    Impression :   · AFib (chronically anticoagulated with warfarin)  · HTN  · BPH  · Hypothyroidism  · Dementia   · Pressure wound on coccyx. · Worsening pneumonia  · Single blood culture with Coagulase negative Staphylococci on 12/15/2020  · Covid tests:  · 12/14/2020 -negative  · 12/25/2020 negative    Recommendations:   · Discontinue vancomycin, Zosyn, Levaquin  · Changed antibiotics to Cefepime 2 g IV every 12 hours  · Diuresis as tolerated  · Monitor clinical response    Medical Decision Making/Summary/Discussion:12/27/2020     ·   Infection Control Recommendations   · [place] Precautions  · Contact Isolation MRSA    Antimicrobial Stewardship Recommendations     · Simplification of therapy    Coordination of Outpatient Care:   · Estimated Length of IV antimicrobials:TBD  · Patient will need Midline Catheter Insertion: No  · Patient will need PICC line Insertion:No  · Patient will need: Home IV , [place],  SNF,  LTAC:TBD  · Patient will need outpatient wound care:Yes    Chief complaint/reason for consultation:   · Pneumonia  · Sacral decubiti    History of Present Illness:   Martita Patient is a 82y.o.-year-old white male who was initially admitted on 12/25/2020. Patient seen at the request of Dr. Leonardo Cook DO.    INITIAL HISTORY:    This is a patient with AFib (chronically anticoagulated with warfarin), HTN, BPH, hypothyroidism, dementia who [facility] with shortness of breath. Patient is a poor historian, thus most information is collected from EMR. EMS was called from patient's nursing facility because of increased shortness of breath. Patient uses 4 L oxygen at home,  he was saturating  85% on 4L. Patient was recently discharged from hospital on 12/18/2020 after treatment for pneumonia and supra therapeutic INR. Patient was discharged on PO doxycycline. CXR done at [facility] hospital read as worsening pneumonia. WBC elevated at 21.6>20.6, lactic acid 3.5,Trop: 32>30>29, pro-bnp: 488 D-Dimer: 1.04. CT Chest negative for PE and COVID-19 swab negative. Patient was given 1 dose of IV Zosyn and Cipro in the emergency room   Patient was transferred to [facility].  for continued management of Pneumonia.      CURRENT EVALUATION 12/27/20    Patient is awake and oriented to self only ,agitated and pulling at lines, repeating same sentence again and again. Afebrile, BP improved, still low,  using 5 L NC (4 L at home), saturating 88-90%   He is unable to provide any additional history. He does endorse coarse non productive cough. D/C Zosyn, Levaquin and Vancomycin. Plan to simplify  treatment. We will place the patient on cefepime alone. CXR 12/17/20  Subtle patchy bilateral lung opacities increased from the prior study are   likely on the basis of pneumonia     CXR 12/26/20  compatible with pneumonia and   some basilar atelectasis/consolidation. CT chest 12/25/20  Bilateral lower lobe consolidation and scattered bilateral ground-glass   nodular opacities are likely on the basis of pneumonia       Labs, X rays reviewed: 12/27/2020    BUN:25>17  Cr:1.16>0.85    WBC:21.6>20.6>16.8  Hb:10.8>9>8.5  Plat: 229>992>560    Cultures:  Urine:  · Strept pneumonia antigen negative   · Legionella antigen negative    Blood: 12/25/20- no growth till date. ·   Sputum :  ·   Wound:  ·     Discussed with patient, RN, IM. I have personally reviewed the past medical history, past surgical history, medications, social history, and family history, and I have updated the database accordingly.   Past Medical History:     Past Medical History:   Diagnosis Date    Atherosclerosis     Benign prostatic hyperplasia     Hypertension     Hypothyroidism     Osteoporosis     Paroxysmal A-fib ([address]     Pneumonia     Rhabdomyolysis     Smoking     Spondylolisthesis  Vitamin D deficiency        Past Surgical  History:     Past Surgical History:   Procedure Laterality Date    ANKLE SURGERY      HIP SURGERY      TONSILLECTOMY AND ADENOIDECTOMY         Medications:      warfarin (COUMADIN) daily dosing (placeholder)   Other RX Placeholder    multivitamin  1 tablet Oral Daily    cefTRIAXone (ROCEPHIN) IV  2 g Intravenous Q24H    midodrine  5 mg Oral TID WC    sodium chloride flush  10 mL Intravenous 2 times per day    ipratropium-albuterol  1 ampule Inhalation Q4H WA    digoxin  125 mcg Oral Daily    dilTIAZem  120 mg Oral Daily    finasteride  5 mg Oral Daily    levothyroxine  75 mcg Oral Daily    tamsulosin  0.8 mg Oral Nightly       Social History:     Social History     Socioeconomic History    Marital status:      Spouse name: Not on file    Number of children: Not on file    Years of education: Not on file    Highest education level: Not on file   Occupational History    Not on file   Social Needs    Financial resource strain: Not on file    Food insecurity     Worry: Not on file     Inability: Not on file    Transportation needs     Medical: Not on file     Non-medical: Not on file   Tobacco Use    Smoking status: Never Smoker    Smokeless tobacco: Never Used   Substance and Sexual Activity    Alcohol use: Never     Frequency: Never     Binge frequency: Never    Drug use: Never    Sexual activity: Not on file   Lifestyle    Physical activity     Days per week: Not on file     Minutes per session: Not on file    Stress: Not on file   Relationships    Social connections     Talks on phone: Not on file     Gets together: Not on file     Attends Jain service: Not on file     Active member of club or organization: Not on file     Attends meetings of clubs or organizations: Not on file     Relationship status: Not on file    Intimate partner violence     Fear of current or ex partner: Not on file     Emotionally abused: Not on file 3.5*     --  104   CO2 27  --  25   BUN 25*  --  17   CREATININE 1.16  --  0.85   MG  --   --  2.1     Hepatic Function Panel:   Recent Labs     20  1130 20  0538   PROT 6.4  --    LABALBU 3.1* 1.8*   BILITOT 0.63  --    ALKPHOS 90  --    ALT 28  --    AST 20  --      No results for input(s): RPR in the last 72 hours. No results for input(s): HIV in the last 72 hours. No results for input(s): BC in the last 72 hours. Lab Results   Component Value Date    MUCUS NOT REPORTED 2020    RBC 2.90 2020    TRICHOMONAS NOT REPORTED 2020    WBC 16.8 2020    YEAST NOT REPORTED 2020    TURBIDITY CLEAR 2020     Lab Results   Component Value Date    CREATININE 0.85 2020    GLUCOSE 100 2020       Medical Decision Making-Imagin/26/20    Medical Decision [id number]-Wtoseedg-Eduod:       Medical Decision Making-Other:     Note:  · Labs, medications, radiologic studies were reviewed with personal review of films  · Large amounts of data were reviewed  · Discussed with nursing Staff, Discharge planner  · Infection Control and Prevention measures reviewed  · All prior entries were reviewed  · Administer medications as ordered  · Prognosis: Guarded  · Discharge planning reviewed  · Follow up as outpatient. Thank you for allowing us to participate in the care of this patient. Please call with questions. Vania Rueda MD     ATTESTATION:    I have discussed the case, including pertinent history and exam findings with the residents and students. I have seen and examined the patient and the key elements of the encounter have been performed by me. I was present when the student obtained his information or examined the patient. I have reviewed the laboratory data, other diagnostic studies and discussed them with the residents. I have updated the medical record where necessary.     I agree with the assessment, plan and orders as documented by the resident/ student.     Sonal Mac MD.

## 2020-12-27 NOTE — CARE COORDINATION
Case Management Initial Discharge Plan  Terrell Lopez,             Met with:dtr at bedside to discuss discharge plans. Information verified: address, contacts, phone number, , insurance address [phone]. [address] [place] faxed correction    Emergency Contact/Next of Kin name & number: Chino Osipna [phone]    PCP: Adrian Cintron MD  Date of last visit: 3-4 mths    Insurance Provider: [employer]    Discharge Planning    Living Arrangements:    Lives in apt alone  Support Systems:     dtr    Home apt ground level no stairs    Patient able to perform ADL's:Independent  However has had frequent falls and been in SNF   Current Services (outpatient & in home)  DME equipment: rollator can   DME provider:     Receiving oral anticoagulation therapy? Yes coumadin  If indicated:   Physician managing anticoagulation treatment:   Where does patient obtain lab work for ATC treatment? Potential Assistance Needed:       Patient agreeable to home care: No  [place] of choice provided:  n/a    Prior SNF/Rehab Placement and Facility: [facility]  Agreeable to SNF/Rehab: Yes  [place] of choice provided: yes     Evaluation: no    Expected Discharge date:       Patient expects to be discharged to: Follow Up Appointment: Best Day/ Time:      Transportation provider: will need  Transportation arrangements needed for discharge: Yes    Readmission Risk              Risk of Unplanned Readmission:        19             Does patient have a readmission risk score greater than 14?: Yes  If yes, follow-up appointment must be made within 7 days of discharge.      Goals of Care: resume adls      Discharge Plan: SNF dtr states he came from [facility]           Electronically signed by Baldev Barahona RN on 20 at 2:30 PM EST

## 2020-12-27 NOTE — PROGRESS NOTES
Pharmacy Note  Warfarin Consult follow-up      Recent Labs     12/27/20  0538   INR 1.4     Recent Labs     12/25/20  1130 12/26/20  0410 12/27/20  0538   HGB 10.8* 9.0* 8.5*   HCT 33.7* 31.3* 27.9*    250 227       Significant Drug-Drug Interactions:  New warfarin drug-drug interactions: cefepime  Discontinued drug-drug interactions: Levaquin, Zosyn  Current warfarin drug-drug interactions: Synthroid    Date INR Dose   12/25 2.1 None   12/26 2.1 2 mg    12/27 1.4 5 mg     Notes:                   INR subtherapeutic. Will order one time dose of 5 mg for today and continue to monitor daily PT/INR while inpatient. Ventura Smith, Pharm. D.   PGY-1 Pharmacy Practice Resident   12/27/2020 1:45 PM

## 2020-12-28 NOTE — FLOWSHEET NOTE
DATE: 2020    NAME: Geetha Kent  MRN: [id number]   : 1935    Patient not seen this date for Physical Therapy due to:  [] Blood transfusion in progress  [] Hemodialysis  [x] Patient Declined: pt adamantly refused therapy this morning. Will check back on pt if time allows. [] Spine Precautions   [] Strict Bedrest  [] Surgery/ Procedure  [] Testing      [] Other        [] PT is being discontinued at this time. Patient independent. No further needs. [] PT is being discontinued at this time due to declining physical/ medical status. Therapy is not appropriate at this time.     Nasima Pal, PTA

## 2020-12-28 NOTE — PROGRESS NOTES
Pharmacy Note  Warfarin Consult follow-up    Recent Labs     12/28/20  0529   INR 1.2     Recent Labs     12/26/20  0410 12/27/20  0538 12/28/20  0529   HGB 9.0* 8.5* 9.0*   HCT 31.3* 27.9* 28.5*    227 273     Current warfarin drug-drug interactions: acetaminophen, levothyroxine    Date INR Dose   12/25 2.1 None   12/26 2.1 2 mg    12/27 1.4 5 mg   12/28/2020 1.2 3mg     Notes:  Give warfarin 3mg today on 12/28/2020. Daily PT/INR while inpatient.        Oli Layton, Neela, CACP  Clinical Pharmacist Medication Management  12/28/2020  3:02 PM

## 2020-12-28 NOTE — CARE COORDINATION
TRANSITIONAL CARE PLANNING/ 2 Rehab Ming Day: 3    Reason for Admission: Pneumonia [J18.9]     Treatment Plan of Care:   placed on Hi radha 25/60% r/t desaturation in 80%    Tests/Procedures still needed:     Barriers to Discharge:  needs to be 24 hour sitter free  Hi radha 60% new yesterday    Readmission Risk  16%            Risk of Unplanned Readmission:        16     Patient goals/Treatment Preferences/Transitional Plan: SNF  Referrals Made: [facility]    Follow Up needed:   Came from [employer] [facility] spoke with Nae Pearce in admissions at [address] he is not a bed hold  She is going to verify his Insurance and call me back as we have [employer] primary and she had medicare primary. She will call me back once she verifies Insurance to know whether he will need a precert.     Spoke with Daughter Emory Ramos about pt being on hiflo may need Ltach her preference is [facility]  E-referral placed spoke with Donna Keller

## 2020-12-28 NOTE — PROGRESS NOTES
[facility] - Progress Note    Today's Date and Time: 12/28/2020, 1:53 PM    Impression :   · AFib (chronically anticoagulated with warfarin)  · HTN  · BPH  · Hypothyroidism  · Dementia   · Pressure wound on coccyx. · Worsening pneumonia  · Single blood culture with Coagulase negative Staphylococci on 12/15/2020  · Covid tests:  · 12/14/2020 -negative  · 12/25/2020 negative    Recommendations:   · Discontinue vancomycin, Zosyn, Levaquin  · Changed antibiotics to Cefepime 2 g IV every 12 hours  · Diuresis as tolerated  · Monitor clinical response  · Wound care    Medical Decision Making/Summary/Discussion:12/28/2020     ·   Infection Control Recommendations   · [place] Precautions  · Contact Isolation MRSA    Antimicrobial Stewardship Recommendations     · Simplification of therapy    Coordination of Outpatient Care:   · Estimated Length of IV antimicrobials:TBD  · Patient will need Midline Catheter Insertion: No  · Patient will need PICC line Insertion:No  · Patient will need: Home IV , [place],  SNF,  LTAC:TBD  · Patient will need outpatient wound care:Yes    Chief complaint/reason for consultation:   · Pneumonia  · Sacral decubiti    History of Present Illness:   Martita Patient is a 82y.o.-year-old white male who was initially admitted on 12/25/2020. Patient seen at the request of Dr. Leonardo Cook DO.    INITIAL HISTORY:    This is a patient with AFib (chronically anticoagulated with warfarin), HTN, BPH, hypothyroidism, dementia who presents from [facility] ER with shortness of breath. Patient is a poor historian, thus most information is collected from EMR. EMS was called from patient's nursing facility because of increased shortness of breath. Patient uses 4 L oxygen at home,  he was saturating  85% on 4L. Patient was recently discharged from hospital on 12/18/2020 after treatment for pneumonia and supra therapeutic INR.   Patient was discharged on PO doxycycline. CXR done at [facility] read as worsening pneumonia. WBC elevated at 21.6>20.6, lactic acid 3.5,Trop: 32>30>29, pro-bnp: 488 D-Dimer: 1.04. CT Chest negative for PE and COVID-19 swab negative. Patient was given 1 dose of IV Zosyn and Cipro in the emergency room   Patient was transferred to [facility].  for continued management of Pneumonia.      CURRENT EVALUATION 12/28/20    Patient is awake and responds to commands. He remains on high flow    Afebrile, BP improved, still low (93/45), he is on high flow O2 at 25L, 60% FiO2, saturation 92%. He notes coarse non productive cough. Overall fairly stable    12/27/20  D/C Zosyn, Levaquin and Vancomycin. Plan to simplify  treatment. We will place the patient on cefepime alone. CXR 12/17/20  Subtle patchy bilateral lung opacities increased from the prior study are   likely on the basis of pneumonia     CXR 12/26/20  compatible with pneumonia and   some basilar atelectasis/consolidation. CT chest 12/25/20  Bilateral lower lobe consolidation and scattered bilateral ground-glass   nodular opacities are likely on the basis of pneumonia       Labs, X rays reviewed: 12/28/2020    BUN:25>17>19  Cr:1.16>0.85>0.85    WBC:21.6>20.6>16.8>14.5  Hb:10.8>9>8.5>9.0  Plat: 339>250>227>273    Cultures:  Urine:  · Strept pneumonia antigen negative   · Legionella antigen negative    Blood: 12/25/20- no growth till date. ·   Sputum :  ·   Wound:  ·     Discussed with patient, RN, IM. I have personally reviewed the past medical history, past surgical history, medications, social history, and family history, and I have updated the database accordingly.   Past Medical History:     Past Medical History:   Diagnosis Date    Atherosclerosis     Benign prostatic hyperplasia     Hypertension     Hypothyroidism     Osteoporosis     Paroxysmal A-fib ([address]     Pneumonia     Rhabdomyolysis     Smoking     Spondylolisthesis     Vitamin D deficiency Past Surgical  History:     Past Surgical History:   Procedure Laterality Date    ANKLE SURGERY      HIP SURGERY      TONSILLECTOMY AND ADENOIDECTOMY         Medications:      dextromethorphan-guaiFENesin  10 mL Oral 4 times per day    cefepime  2 g Intravenous Q12H    warfarin (COUMADIN) daily dosing (placeholder)   Other RX Placeholder    multivitamin  1 tablet Oral Daily    midodrine  5 mg Oral TID WC    sodium chloride flush  10 mL Intravenous 2 times per day    ipratropium-albuterol  1 ampule Inhalation Q4H WA    digoxin  125 mcg Oral Daily    dilTIAZem  120 mg Oral Daily    finasteride  5 mg Oral Daily    levothyroxine  75 mcg Oral Daily    tamsulosin  0.8 mg Oral Nightly       Social History:     Social History     Socioeconomic History    Marital status:      Spouse name: Not on file    Number of children: Not on file    Years of education: Not on file    Highest education level: Not on file   Occupational History    Not on file   Social Needs    Financial resource strain: Not on file    Food insecurity     Worry: Not on file     Inability: Not on file    Transportation needs     Medical: Not on file     Non-medical: Not on file   Tobacco Use    Smoking status: Never Smoker    Smokeless tobacco: Never Used   Substance and Sexual Activity    Alcohol use: Never     Frequency: Never     Binge frequency: Never    Drug use: Never    Sexual activity: Not on file   Lifestyle    Physical activity     Days per week: Not on file     Minutes per session: Not on file    Stress: Not on file   Relationships    Social connections     Talks on phone: Not on file     Gets together: Not on file     Attends [employer] service: Not on file     Active member of club or organization: Not on file     Attends meetings of clubs or organizations: Not on file     Relationship status: Not on file    Intimate partner violence     Fear of current or ex partner: Not on file     Emotionally abused: Not on file     Physically abused: Not on file     Forced sexual activity: Not on file   Other Topics Concern    Not on file   Social History Narrative    Not on file       Family History:   History reviewed. No pertinent family history. Allergies:   Patient has no known allergies. Review of Systems:   Unable to obtain much history from patient. Physical Examination :     Patient Vitals for the past 8 hrs:   BP Temp Temp src Pulse Resp SpO2   12/28/20 1244 -- -- -- -- -- 99 %   12/28/20 1133 (!) 93/45 97.9 °F (36.6 °C) Oral -- 22 --   12/28/20 0842 -- -- -- -- -- 92 %   12/28/20 0825 -- -- -- 67 -- 93 %   12/28/20 0631 121/69 97.7 °F (36.5 °C) Oral 76 18 96 %     General Appearance: Awake, alert, oriented to self only,agitated and ill appearing. Head:  Normocephalic, no trauma  Eyes: Pupils equal, round, reactive to light and accommodation; extraocular movements intact; sclera anicteric; conjunctivae pink. No embolic phenomena. ENT: Oropharynx clear, without erythema, exudate, or thrush. No tenderness of sinuses. Mouth/throat: mucosa pink and moist. No lesions. Dentition in good repair. Neck:Supple, without lymphadenopathy. Thyroid normal, No bruits. Pulmonary/Chest: Clear to auscultation, has crackles on auscultation. No dullness to percussion. Cardiovascular: irregular rhythm without murmurs, rubs, or gallops. Abdomen: Soft, non tender. Bowel sounds normal. No organomegaly  All four Extremities: No cyanosis, clubbing, edema, or effusions. Neurologic: No gross sensory or motor deficits. Skin: Warm and dry with good turgor. No signs of peripheral arterial or venous insufficiency. No ulcerations. No open wounds.     Medical Decision Making -Laboratory:   I have independently reviewed/ordered the following labs:    CBC with Differential:   Recent Labs     12/27/20  0538 12/28/20  0529   WBC 16.8* 14.5*   HGB 8.5* 9.0*   HCT 27.9* 28.5*    273   LYMPHOPCT 8* 11*   MONOPCT 2* 3 BMP:   Recent Labs     20  0538 20  0529    136   K 3.5* 3.0*    94*   CO2 25 33*   BUN 17 19   CREATININE 0.85 0.85   MG 2.1 2.0     Hepatic Function Panel:   Recent Labs     20  0538 20  0529   LABALBU 1.8* 2.2*     No results for input(s): RPR in the last 72 hours. No results for input(s): HIV in the last 72 hours. No results for input(s): BC in the last 72 hours. Lab Results   Component Value Date    MUCUS NOT REPORTED 2020    RBC 3.05 2020    TRICHOMONAS NOT REPORTED 2020    WBC 14.5 2020    YEAST NOT REPORTED 2020    TURBIDITY CLEAR 2020     Lab Results   Component Value Date    CREATININE 0.85 2020    GLUCOSE 107 2020       Medical Decision Making-Imagin/26/20    Medical Decision Nsjnye-Nwmpahwb-Idadi:       Medical Decision Making-Other:     Note:  · Labs, medications, radiologic studies were reviewed with personal review of films  · Large amounts of data were reviewed  · Discussed with nursing Staff, Discharge planner  · Infection Control and Prevention measures reviewed  · All prior entries were reviewed  · Administer medications as ordered  · Prognosis: Guarded  · Discharge planning reviewed  · Follow up as outpatient. Thank you for allowing us to participate in the care of this patient. Please call with questions. Rayne Murry MD-[email or web address]    ATTESTATION:    I have discussed the case, including pertinent history and exam findings with the residents and students. I have seen and examined the patient and the key elements of the encounter have been performed by me. I was present when the student obtained his information or examined the patient. I have reviewed the laboratory data, other diagnostic studies and discussed them with the residents. I have updated the medical record where necessary. I agree with the assessment, plan and orders as documented by the resident/ student.     Polina Guzmán, MD.

## 2020-12-28 NOTE — PROGRESS NOTES
[facility] - Progress Note    Today's Date and Time: 12/28/2020, 11:42 AM    Impression :   · AFib (chronically anticoagulated with warfarin)  · HTN  · BPH  · Hypothyroidism  · Dementia   · Pressure wound on coccyx. · Worsening pneumonia  · Single blood culture with Coagulase negative Staphylococci on 12/15/2020  · Covid tests:  · 12/14/2020 -negative  · 12/25/2020 negative    Recommendations:   · Discontinue vancomycin, Zosyn, Levaquin  · Changed antibiotics to Cefepime 2 g IV every 12 hours  · Diuresis as tolerated  · Monitor clinical response    Medical Decision Making/Summary/Discussion:12/28/2020     ·   Infection Control Recommendations   · [place] Precautions  · Contact Isolation MRSA    Antimicrobial Stewardship Recommendations     · Simplification of therapy    Coordination of Outpatient Care:   · Estimated Length of IV antimicrobials:TBD  · Patient will need Midline Catheter Insertion: No  · Patient will need PICC line Insertion:No  · Patient will need: Home IV , [place],  SNF,  LTAC:TBD  · Patient will need outpatient wound care:Yes    Chief complaint/reason for consultation:   · Pneumonia  · Sacral decubiti    History of Present Illness:   Cecelia Pantoja is a 82y.o.-year-old white male who was initially admitted on 12/25/2020. Patient seen at the request of Dr. Radha Lee DO.    INITIAL HISTORY:    This is a patient with AFib (chronically anticoagulated with warfarin), HTN, BPH, hypothyroidism, dementia who presents from [facility] ER with shortness of breath. Patient is a poor historian, thus most information is collected from EMR. EMS was called from patient's nursing facility because of increased shortness of breath. Patient uses 4 L oxygen at home,  he was saturating  85% on 4L. Patient was recently discharged from hospital on 12/18/2020 after treatment for pneumonia and supra therapeutic INR. Patient was discharged on PO doxycycline. CXR done at [facility] read as worsening pneumonia. WBC elevated at 21.6>20.6, lactic acid 3.5,Trop: 32>30>29, pro-bnp: 488 D-Dimer: 1.04. CT Chest negative for PE and COVID-19 swab negative. Patient was given 1 dose of IV Zosyn and Cipro in the emergency room   Patient was transferred to [facility] [place].  for continued management of Pneumonia.      CURRENT EVALUATION 12/28/20    Patient is awake and responds to commands. He    Afebrile, BP improved, still low (93/45), he is on high flow O2 at 25L, 60% FiO2, saturation 92%. He notes coarse non productive cough. 12/27/20  D/C Zosyn, Levaquin and Vancomycin. Plan to simplify  treatment. We will place the patient on cefepime alone. CXR 12/17/20  Subtle patchy bilateral lung opacities increased from the prior study are   likely on the basis of pneumonia     CXR 12/26/20  compatible with pneumonia and   some basilar atelectasis/consolidation. CT chest 12/25/20  Bilateral lower lobe consolidation and scattered bilateral ground-glass   nodular opacities are likely on the basis of pneumonia       Labs, X rays reviewed: 12/28/2020    BUN:25>17>19  Cr:1.16>0.85>0.85    WBC:21.6>20.6>16.8>14.5  Hb:10.8>9>8.5>9.0  Plat: 339>250>227>273    Cultures:  Urine:  · Strept pneumonia antigen negative   · Legionella antigen negative    Blood: 12/25/20- no growth till date. ·   Sputum :  ·   Wound:  ·     Discussed with patient, RN, IM. I have personally reviewed the past medical history, past surgical history, medications, social history, and family history, and I have updated the database accordingly.   Past Medical History:     Past Medical History:   Diagnosis Date    Atherosclerosis     Benign prostatic hyperplasia     Hypertension     Hypothyroidism     Osteoporosis     Paroxysmal A-fib ([address]     Pneumonia     Rhabdomyolysis     Smoking     Spondylolisthesis     Vitamin D deficiency        Past Surgical  History:     Past Surgical History:   Procedure Laterality Date    ANKLE SURGERY      HIP SURGERY      TONSILLECTOMY AND ADENOIDECTOMY         Medications:      dextromethorphan-guaiFENesin  10 mL Oral 4 times per day    cefepime  2 g Intravenous Q12H    warfarin (COUMADIN) daily dosing (placeholder)   Other RX Placeholder    multivitamin  1 tablet Oral Daily    midodrine  5 mg Oral TID WC    sodium chloride flush  10 mL Intravenous 2 times per day    ipratropium-albuterol  1 ampule Inhalation Q4H WA    digoxin  125 mcg Oral Daily    dilTIAZem  120 mg Oral Daily    finasteride  5 mg Oral Daily    levothyroxine  75 mcg Oral Daily    tamsulosin  0.8 mg Oral Nightly       Social History:     Social History     Socioeconomic History    Marital status:      Spouse name: Not on file    Number of children: Not on file    Years of education: Not on file    Highest education level: Not on file   Occupational History    Not on file   Social Needs    Financial resource strain: Not on file    Food insecurity     Worry: Not on file     Inability: Not on file    Transportation needs     Medical: Not on file     Non-medical: Not on file   Tobacco Use    Smoking status: Never Smoker    Smokeless tobacco: Never Used   Substance and Sexual Activity    Alcohol use: Never     Frequency: Never     Binge frequency: Never    Drug use: Never    Sexual activity: Not on file   Lifestyle    Physical activity     Days per week: Not on file     Minutes per session: Not on file    Stress: Not on file   Relationships    Social connections     Talks on phone: Not on file     Gets together: Not on file     Attends [employer] service: Not on file     Active member of club or organization: Not on file     Attends meetings of clubs or organizations: Not on file     Relationship status: Not on file    Intimate partner violence     Fear of current or ex partner: Not on file     Emotionally abused: Not on file     Physically abused: Not on file     Forced sexual activity: Not on file   Other Topics Concern    Not on file   Social History Narrative    Not on file       Family History:   History reviewed. No pertinent family history. Allergies:   Patient has no known allergies. Review of Systems:   Unable to obtain much history from patient. Physical Examination :     Patient Vitals for the past 8 hrs:   BP Temp Temp src Pulse Resp SpO2   12/28/20 1133 (!) 93/45 97.9 °F (36.6 °C) Oral -- 22 --   12/28/20 0842 -- -- -- -- -- 92 %   12/28/20 0825 -- -- -- 67 -- 93 %   12/28/20 0631 121/69 97.7 °F (36.5 °C) Oral 76 18 96 %     General Appearance: Awake, alert, oriented to self only,agitated and ill appearing. Head:  Normocephalic, no trauma  Eyes: Pupils equal, round, reactive to light and accommodation; extraocular movements intact; sclera anicteric; conjunctivae pink. No embolic phenomena. ENT: Oropharynx clear, without erythema, exudate, or thrush. No tenderness of sinuses. Mouth/throat: mucosa pink and moist. No lesions. Dentition in good repair. Neck:Supple, without lymphadenopathy. Thyroid normal, No bruits. Pulmonary/Chest: Clear to auscultation, has crackles on auscultation. No dullness to percussion. Cardiovascular: irregular rhythm without murmurs, rubs, or gallops. Abdomen: Soft, non tender. Bowel sounds normal. No organomegaly  All four Extremities: No cyanosis, clubbing, edema, or effusions. Neurologic: No gross sensory or motor deficits. Skin: Warm and dry with good turgor. No signs of peripheral arterial or venous insufficiency. No ulcerations. No open wounds.     Medical Decision Making -Laboratory:   I have independently reviewed/ordered the following labs:    CBC with Differential:   Recent Labs     12/27/20 0538 12/28/20  0529   WBC 16.8* 14.5*   HGB 8.5* 9.0*   HCT 27.9* 28.5*    273   LYMPHOPCT 8* 11*   MONOPCT 2* 3     BMP:   Recent Labs     12/27/20 0538 12/28/20  0529    136   K 3.5* 3.0*    94*   CO2 25 33*   BUN 17 19   CREATININE 0.85 0.85   MG 2.1 2.0     Hepatic Function Panel:   Recent Labs     20  0538 20  0529   LABALBU 1.8* 2.2*     No results for input(s): RPR in the last 72 hours. No results for input(s): HIV in the last 72 hours. No results for input(s): BC in the last 72 hours. Lab Results   Component Value Date    MUCUS NOT REPORTED 2020    RBC 3.05 2020    TRICHOMONAS NOT REPORTED 2020    WBC 14.5 2020    YEAST NOT REPORTED 2020    TURBIDITY CLEAR 2020     Lab Results   Component Value Date    CREATININE 0.85 2020    GLUCOSE 107 2020       Medical Decision Making-Imagin/26/20    Medical Decision Eknptv-Nxwxqbkz-Vepwt:       Medical Decision Making-Other:     Note:  · Labs, medications, radiologic studies were reviewed with personal review of films  · Large amounts of data were reviewed  · Discussed with nursing Staff, Discharge planner  · Infection Control and Prevention measures reviewed  · All prior entries were reviewed  · Administer medications as ordered  · Prognosis: Guarded  · Discharge planning reviewed  · Follow up as outpatient. Thank you for allowing us to participate in the care of this patient. Please call with questions. Sukhwinder Kearney-[email or web address]    ATTESTATION:    I have discussed the case, including pertinent history and exam findings with the residents and students. I have seen and examined the patient and the key elements of the encounter have been performed by me. I was present when the student obtained his information or examined the patient. I have reviewed the laboratory data, other diagnostic studies and discussed them with the residents. I have updated the medical record where necessary. I agree with the assessment, plan and orders as documented by the resident/ student.     Nya Chapman MD.

## 2020-12-29 NOTE — PROGRESS NOTES
[facility]  Office: [address], DO, Veta Mcardle, DO, Vicente Guerin, DO, Lor Oviedo, DO, Robyn Vuong MD, Jaleel Hadn MD, Ashlee Macias MD, Cathleen Corona MD, Autumn Grijalva MD, Vira Owens MD, Anabel Busby MD, Mallorie Lawson MD, Leandra Lagunas MD, Kavon Tim DO, Jorje Briones MD, Ubaldo Arzola MD, Paige Alfaro DO, Aimee Kurtz MD,  Maty Magallon DO, Papa Flores MD, Chris Rivera MD, Radha Magana, [facility], [facility], [facility], Mandi Méndez, CNP, Lauri Harris, CNS, Cory Tiwari, CNP, Magda Layton, CNP, Huyen Chong, CNP, Omari Good, CNP, Uma Cooper, CNP, Gloria Doyle PA-C, Sue Deleon, [facility], Jai Le, CNP, Kerry Addison, CNP, Chris Alejandro, CNP, Konrad Alba, CNP, Nerissa Betancur, [address] [place] [address]    Progress Note    12/29/2020    9:34 AM    Name:   Jaycee Morris  MRN:     [id number]     [place]:      [de-identified]   Room:   [address] [place] [address] Day:  4  Admit Date:  12/25/2020 11:29 AM    PCP:   Aure Ang MD  Code Status:  Full Code    Subjective:     C/C:   Chief Complaint   Patient presents with    Respiratory Distress      Interval History Status: improved. Pt seen and examined this morning   Resting comfortably in bed at this time       Review of Systems:     12 point ROS performed today and negative for anything other than what was stated in subjective     Medications:      Allergies:  No Known Allergies    Current Meds:   Scheduled Meds:    warfarin  3 mg Oral Once    dextromethorphan-guaiFENesin  10 mL Oral 4 times per day    cefepime  2 g Intravenous Q12H    warfarin (COUMADIN) daily dosing (placeholder)   Other RX Placeholder    multivitamin  1 tablet Oral Daily    midodrine  5 mg Oral TID WC    sodium chloride flush  10 mL Intravenous 2 times per day    ipratropium-albuterol  1 ampule Inhalation Q4H WA    digoxin  125 mcg Oral Daily    dilTIAZem 120 mg Oral Daily    finasteride  5 mg Oral Daily    levothyroxine  75 mcg Oral Daily    tamsulosin  0.8 mg Oral Nightly     Continuous Infusions:   PRN Meds: potassium chloride **OR** potassium alternative oral replacement **OR** potassium chloride, nicotine, promethazine **OR** ondansetron, magnesium hydroxide, acetaminophen **OR** acetaminophen, albuterol    Data:     Past Medical History:   has a past medical history of Atherosclerosis, Benign prostatic hyperplasia, Hypertension, Hypothyroidism, Osteoporosis, Paroxysmal A-fib ([address] Pneumonia, Rhabdomyolysis, Smoking, Spondylolisthesis, and Vitamin D deficiency. Social History:   reports that he has never smoked. He has never used smokeless tobacco. He reports that he does not drink alcohol or use drugs. Family History: History reviewed. No pertinent family history. Vitals:  BP (!) 114/59   Pulse 89   Temp 97.8 °F (36.6 °C) (Oral)   Resp 24   Ht 6' 3\" (1.905 m)   Wt 184 lb 3.2 oz (83.6 kg)   SpO2 93%   BMI 23.02 kg/m²   Temp (24hrs), Av.1 °F (36.7 °C), Min:97.6 °F (36.4 °C), Max:98.5 °F (36.9 °C)    Recent Labs     20  0819   POCGLU 127*       I/O (24Hr):     Intake/Output Summary (Last 24 hours) at 2020 0934  Last data filed at 2020 0555  Gross per 24 hour   Intake 840 ml   Output 1250 ml   Net -410 ml       Labs:  Hematology:  Recent Labs     20  0538 20  0529 20  0357   WBC 16.8* 14.5* 14.3*   RBC 2.90* 3.05* 3.31*   HGB 8.5* 9.0* 9.5*   HCT 27.9* 28.5* 32.1*   MCV 96.2 93.4 97.0   MCH 29.3 29.5 28.7   MCHC 30.5 31.6 29.6   RDW 19.6* 19.7* 19.8*    273 320   MPV 10.2 11.0 10.9   INR 1.4 1.2 1.1     Chemistry:  Recent Labs     20  0538 20  0538 20  0529 20  0529 20  1603 20  2219 20  0357     --  136  --   --   --  134*   K 3.5*  --  3.0*  --   --   --  3.8     --  94*  --   --   --  95*   CO2 25  --  33*  --   --   --  29   GLUCOSE 100*  -- 107*  --   --   --  103*   BUN 17  --  19  --   --   --  18   CREATININE 0.85  --  0.85  --   --   --  0.72   MG 2.1  --  2.0  --   --   --  2.1   ANIONGAP 8*  --  9  --   --   --  10   LABGLOM >60  --  >60  --   --   --  >60   GFRAA >60  --  >60  --   --   --  >60   CALCIUM 8.5*  --  8.7  --   --   --  8.8   PHOS 2.7  --  3.0  --   --   --  2.5   TROPHS 41*   < >  --    < > 34* 39* 38*    < > = values in this interval not displayed. Recent Labs     12/27/20  0538 12/28/20  0529 12/29/20  0357 12/29/20  0819   LABALBU 1.8* 2.2* 2.2*  --    POCGLU  --   --   --  127*     ABG:No results found for: POCPH, PHART, PH, POCPCO2, BIP1ARG, PCO2, POCPO2, PO2ART, PO2, POCHCO3, AGT5SHW, HCO3, NBEA, PBEA, BEART, BE, THGBART, THB, LBC5XRC, PTRL8ZDK, S2RAJRNH, O2SAT, FIO2  Lab Results   Component Value Date/Time    SPECIAL NOT REPORTED 12/26/2020 05:12 PM     Lab Results   Component Value Date/Time    CULTURE PRESUMPTIVE CANDIDA ALBICANS 10 to 50,000 CFU/ML (A) 12/26/2020 05:12 PM       Radiology:  Rebekah Hurley Chest (2 Vw)    Result Date: 12/26/2020  Similar findings to those seen on recent CT, compatible with pneumonia and some basilar atelectasis/consolidation. Xr Chest Portable    Result Date: 12/27/2020  Persistent findings of aspiration or pneumonia near the bases, appearing worsened in the right base and decreased in the lingula. Xr Chest Portable    Result Date: 12/25/2020  Subtle patchy bilateral lung opacities increased from the prior study are likely on the basis of pneumonia. Ct Chest Pulmonary Embolism W Contrast    Result Date: 12/25/2020  No central or segmental pulmonary embolus. Bilateral lower lobe consolidation and scattered bilateral ground-glass nodular opacities are likely on the basis of pneumonia. Recommend follow-up.        Physical Examination:        General appearance: Chronically ill-appearing male resting comfortably in bed in no acute distress  Mental Status: comfortable oriented x2  Lungs: continue to monitor     8.  DISPO:   - Pending clinical improvement   - PT/OT consulted        Tanvi Chang MD  12/29/2020  9:34 AM

## 2020-12-29 NOTE — PROGRESS NOTES
[facility] - Progress Note    Today's Date and Time: 12/29/2020, 6:14 PM    Impression :   · AFib (chronically anticoagulated with warfarin)  · HTN  · BPH  · Hypothyroidism  · Dementia   · Pressure wound on coccyx. · Worsening pneumonia  · Single blood culture with Coagulase negative Staphylococci on 12/15/2020  · Covid tests:  · 12/14/2020 -negative  · 12/25/2020 negative    Recommendations:   · Unasyn 3 g IV q 6hr for aspiration  · Diuresis as tolerated  · Monitor clinical response  · Wound care    Medical Decision Making/Summary/Discussion:12/29/2020     ·   Infection Control Recommendations   · [place] Precautions  · Contact Isolation MRSA    Antimicrobial Stewardship Recommendations     · Simplification of therapy    Coordination of Outpatient Care:   · Estimated Length of IV antimicrobials:TBD  · Patient will need Midline Catheter Insertion: No  · Patient will need PICC line Insertion:No  · Patient will need: Home IV , [place],  SNF,  LTAC:TBD  · Patient will need outpatient wound care:Yes    Chief complaint/reason for consultation:   · Pneumonia  · Sacral decubiti    History of Present Illness:   Gerhard Mariee is a 82y.o.-year-old white male who was initially admitted on 12/25/2020. Patient seen at the request of Dr. Faisal Cota DO.    INITIAL HISTORY:    This is a patient with AFib (chronically anticoagulated with warfarin), HTN, BPH, hypothyroidism, dementia who presents from [facility] ER with shortness of breath. Patient is a poor historian, thus most information is collected from EMR. EMS was called from patient's nursing facility because of increased shortness of breath. Patient uses 4 L oxygen at home,  he was saturating  85% on 4L. Patient was recently discharged from hospital on 12/18/2020 after treatment for pneumonia and supra therapeutic INR. Patient was discharged on PO doxycycline.   CXR done at outlying hospital read as worsening Past Surgical  History:     Past Surgical History:   Procedure Laterality Date    ANKLE SURGERY      HIP SURGERY      TONSILLECTOMY AND ADENOIDECTOMY         Medications:      enoxaparin  1 mg/kg Subcutaneous BID    ampicillin-sulbactam  3 g Intravenous Q6H    dextromethorphan-guaiFENesin  10 mL Oral 4 times per day    warfarin (COUMADIN) daily dosing (placeholder)   Other RX Placeholder    multivitamin  1 tablet Oral Daily    midodrine  5 mg Oral TID WC    sodium chloride flush  10 mL Intravenous 2 times per day    ipratropium-albuterol  1 ampule Inhalation Q4H WA    digoxin  125 mcg Oral Daily    dilTIAZem  120 mg Oral Daily    finasteride  5 mg Oral Daily    levothyroxine  75 mcg Oral Daily    tamsulosin  0.8 mg Oral Nightly       Social History:     Social History     Socioeconomic History    Marital status:      Spouse name: Not on file    Number of children: Not on file    Years of education: Not on file    Highest education level: Not on file   Occupational History    Not on file   Social Needs    Financial resource strain: Not on file    Food insecurity     Worry: Not on file     Inability: Not on file    Transportation needs     Medical: Not on file     Non-medical: Not on file   Tobacco Use    Smoking status: Never Smoker    Smokeless tobacco: Never Used   Substance and Sexual Activity    Alcohol use: Never     Frequency: Never     Binge frequency: Never    Drug use: Never    Sexual activity: Not on file   Lifestyle    Physical activity     Days per week: Not on file     Minutes per session: Not on file    Stress: Not on file   Relationships    Social connections     Talks on phone: Not on file     Gets together: Not on file     Attends Spiritism service: Not on file     Active member of club or organization: Not on file     Attends meetings of clubs or organizations: Not on file     Relationship status: Not on file    Intimate partner violence     Fear of current or ex partner: Not on file     Emotionally abused: Not on file     Physically abused: Not on file     Forced sexual activity: Not on file   Other Topics Concern    Not on file   Social History Narrative    Not on file       Family History:   History reviewed. No pertinent family history. Allergies:   Patient has no known allergies. Review of Systems:   Unable to obtain much history from patient. Physical Examination :     Patient Vitals for the past 8 hrs:   BP Temp Temp src Pulse Resp SpO2 Weight   12/29/20 1717 (!) 97/49 97.7 °F (36.5 °C) Oral 81 24 98 % 186 lb 9.6 oz (84.6 kg)   12/29/20 1600 (!) 88/53 -- -- 76 20 98 % --   12/29/20 1130 124/67 97.9 °F (36.6 °C) Oral 80 24 98 % 187 lb 3.2 oz (84.9 kg)   12/29/20 1047 -- -- -- -- -- 97 % --     General Appearance: Awake, alert, oriented to self only,agitated and ill appearing. Head:  Normocephalic, no trauma  Eyes: Pupils equal, round, reactive to light and accommodation; extraocular movements intact; sclera anicteric; conjunctivae pink. No embolic phenomena. ENT: Oropharynx clear, without erythema, exudate, or thrush. No tenderness of sinuses. Mouth/throat: mucosa pink and moist. No lesions. Dentition in good repair. Neck:Supple, without lymphadenopathy. Thyroid normal, No bruits. Pulmonary/Chest: Clear to auscultation, has crackles on auscultation. No dullness to percussion. Cardiovascular: irregular rhythm without murmurs, rubs, or gallops. Abdomen: Soft, non tender. Bowel sounds normal. No organomegaly  All four Extremities: No cyanosis, clubbing, edema, or effusions. Neurologic: No gross sensory or motor deficits. Skin: Warm and dry with good turgor. No signs of peripheral arterial or venous insufficiency. No ulcerations. No open wounds.     Medical Decision Making -Laboratory:   I have independently reviewed/ordered the following labs:    CBC with Differential:   Recent Labs     12/28/20  0529 12/29/20  0357   WBC 14.5* 14.3*

## 2020-12-29 NOTE — PROGRESS NOTES
Pharmacy Note  Warfarin Consult follow-up    Recent Labs     12/29/20  0357   INR 1.1     Recent Labs     12/27/20  0538 12/28/20  0529 12/29/20  0357   HGB 8.5* 9.0* 9.5*   HCT 27.9* 28.5* 32.1*    273 320     Current warfarin drug-drug interactions: acetaminophen, levothyroxine    Date INR Dose   12/25 2.1 None   12/26 2.1 2 mg    12/27 1.4 5 mg   12/28/2020 1.2 3mg   12/29/2020 1.1 3mg     Notes:   Give warfarin 3mg today on 12/29/2020. Daily PT/INR while inpatient.      Manual Neela Bennett, CACP  Clinical Pharmacist Medication Management  12/29/2020  8:58 AM

## 2020-12-29 NOTE — PROGRESS NOTES
Physical Therapy  Facility/Department: [address] ORTHO/MED SURG  Daily Treatment Note  NAME: Ricky Escalera  : 1935  MRN: [id number]    Date of Service: 2020    Discharge Recommendations:  Patient would benefit from continued therapy after discharge   PT Equipment Recommendations  Other: CTA    Assessment   Body structures, Functions, Activity limitations: Decreased functional mobility ; Decreased strength;Decreased endurance;Decreased balance  Assessment: Pt performs bed mobility CGA, and sat EOB ~10 minutes CGA-SBA to perform LE exercises. Pt stood x3 with Boogie and RW requiring max VC's for hand placement during transfer. Pt performed standing marches at bedside to imitate gait, then took 3 side steps towards HOB, with RW and CGA. Pt limited this date d/t high-flow. Would benefit from continued PT to address deficits. Prognosis: Good  PT Education: PT Role;Plan of Care;General Safety  REQUIRES PT FOLLOW UP: Yes  Activity Tolerance  Activity Tolerance: Treatment limited secondary to agitation     Patient Diagnosis(es): The primary encounter diagnosis was Pneumonia due to organism. Diagnoses of Warfarin-induced coagulopathy ([address] and Elevated troponin were also pertinent to this visit. has a past medical history of Atherosclerosis, Benign prostatic hyperplasia, Hypertension, Hypothyroidism, Osteoporosis, Paroxysmal A-fib ([address] Pneumonia, Rhabdomyolysis, Smoking, Spondylolisthesis, and Vitamin D deficiency. has a past surgical history that includes Ankle surgery; hip surgery; and Tonsillectomy and adenoidectomy. Restrictions  Restrictions/Precautions  Restrictions/Precautions: General Precautions, Fall Risk, Up as Tolerated  Required Braces or Orthoses?: No  Position Activity Restriction  Other position/activity restrictions: High-flow  Subjective   General  Chart Reviewed: Yes  Response To Previous Treatment: Patient with no complaints from previous session.   Family / Caregiver Present: Yes(Sitter present)  Subjective  Subjective: RN and pt agreeable to PT this morning. Pt alert in bed upon arrival with c/o 7/10 pain on bottom of L foot. Pt has a sitter at bedside. Pain Screening  Patient Currently in Pain: Yes  Pain Assessment  Pain Assessment: 0-10  Pain Level: 7  Pain Location: Foot  Pain Orientation: Left  Vital Signs  Patient Currently in Pain: Yes       Orientation  Orientation  Overall Orientation Status: Impaired  Orientation Level: Oriented to time;Oriented to person;Disoriented to situation;Disoriented to place  Cognition   Cognition  Overall Cognitive Status: Exceptions  Following Commands: Follows one step commands with repetition; Follows one step commands with increased time  Safety Judgement: Decreased awareness of need for safety;Decreased awareness of need for assistance  Problem Solving: Assistance required to correct errors made;Assistance required to identify errors made  Insights: Decreased awareness of deficits  Initiation: Requires cues for some  Sequencing: Requires cues for some  Objective   Bed mobility  Supine to Sit: Contact guard assistance  Sit to Supine: Contact guard assistance  Scooting: Minimal assistance  Transfers  Sit to Stand: Minimal Assistance  Stand to sit: Minimal Assistance  Comment: Pt required max VC's for hand placement during tranfers. Ambulation  Ambulation?: Yes  Ambulation 1  Surface: level tile  Device: Rolling Walker  Other Apparatus: O2(High-flow)  Assistance: Contact guard assistance  Gait Deviations: Slow Chiara;Decreased step length;Decreased step height  Distance: 3 side steps towards HOB. Comments: Pt performed standing marches at bedside to imitate gait 2x 30 seconds each time. Stairs/Curb  Stairs?: No     Balance  Posture: Fair  Sitting - Static: Good  Sitting - Dynamic: Good;-  Standing - Static: Fair;-  Standing - Dynamic: Fair;-  Comments: Pt sat EOB ~10 minutes, CGA-SBA.       Exercises:  Seated LE exercise program: Long Arc Quads, hip abduction/adduction, heel/toe raises, and marches. Reps: 15x each LE.       Goals  Short term goals  Time Frame for Short term goals: 14 visits  Short term goal 1: Pt will be [place] bed mobility  Short term goal 2: Pt will be [place] transfers  Short term goal 3: Pt will be [place] amb 240' RW or least restrictive AD  Short term goal 4: Pt will navigate 2 steps [place]    Plan    Plan  Times per week: 4- 5x/wk  Times per day: Daily  Current Treatment Recommendations: Strengthening, Endurance Training, Functional Mobility Training, Transfer Training, Gait Training, Balance Training, Stair training, Home Exercise Program, Safety Education & Training, Patient/Caregiver Education & Training, Equipment Evaluation, Education, & procurement  Safety Devices  Type of devices: Call light within reach, Bed alarm in place, Left in bed, Patient at risk for falls, Gait belt, All fall risk precautions in place, Nurse notified, Sitter present  Restraints  Initially in place: No     Therapy Time   Individual Concurrent Group Co-treatment   Time In 0850         Time Out 0915         Minutes 25         Timed Code Treatment Minutes: Alejandro 28, PTA

## 2020-12-29 NOTE — PROGRESS NOTES
[address] [facility]  Speech Language Pathology    Date: 12/29/2020  Patient Name: Lorena De La Rosa  YOB: 1935   AGE: 82 y.o. MRN: [id number]        Patient Not Available for Speech Therapy     Due to:  [] Testing  [] Hemodialysis  [] Cancelled by RN  [] Surgery   [] Intubation/Sedation/Pain Medication  [] Medical instability  [x] Other:  Spoke with RN pt. Had MBSS 12/16 and nectar thick liquids were recommended. ST recommends repeat MBSS to determine safest diet with decreased aspiration risk as pt. Admit with s/s of aspiration PN. Next scheduled treatment:   Completed by:  JOE Lock

## 2020-12-29 NOTE — PROGRESS NOTES
Pt's SpO2=88-89% on 25L, 50% HFNC. Pt repositioned into high fowlers, SpO2 remains 88-89%. Pt denies SOB. Increased HFNC to 30L, 60%, SPO2 improved to 92-94%. RT updated, will continue to monitor.

## 2020-12-29 NOTE — PLAN OF CARE
Problem: Respiratory  Intervention: Respiratory assessment  Note: BRONCHOSPASM/BRONCHOCONSTRICTION    IMPROVE  AERATION/BREATHSOUNDS  ADMINISTER BRONCHODILATOR THERAPY AS APPROPRIATE  ASSESS BREATH SOUNDS  PATIENT EDUCATION AS NEEDED     PROVIDE ADEQUATE OXYGENATION WITH ACCEPTABLE SP02/ABG'S    [x]  IDENTIFY APPROPRIATE OXYGEN THERAPY  [x]   MONITOR SP02/ABG'S AS NEEDED   [x]   PATIENT EDUCATION AS NEEDED

## 2020-12-29 NOTE — PROGRESS NOTES
Patient was recently discharged from hospital on 12/18/2020 after treatment for pneumonia and supra therapeutic INR. Patient was discharged on PO doxycycline. CXR done at outlying hospital read as worsening pneumonia. WBC elevated at 21.6>20.6, lactic acid 3.5,Trop: 32>30>29, pro-bnp: 488 D-Dimer: 1.04. CT Chest negative for PE and COVID-19 swab negative. Patient was given 1 dose of IV Zosyn and Cipro in the emergency room   Patient was transferred to [facility]. Vs for continued management of Pneumonia.      CURRENT EVALUATION 12/29/20    Patient is awake and oriented. Afebrile, he is on high flow O2 at 25L, 50% FiO2, saturation 93%. He notes coarse non productive cough. Lungs sound coarse diffusely, worse in the bases. Per nurse, pt was just started on nectar thick diet today. She was concerned that pt has been aspirating some of his drinks before they were thickened. CXR on 12/27 showed consolidations consistent with pneumonia or aspirations in the bases. 12/27/20  D/C Zosyn, Levaquin and Vancomycin. Plan to simplify  treatment. We will place the patient on cefepime alone. CXR 12/17/20  Subtle patchy bilateral lung opacities increased from the prior study are   likely on the basis of pneumonia     CXR 12/26/20  compatible with pneumonia and   some basilar atelectasis/consolidation. CT chest 12/25/20  Bilateral lower lobe consolidation and scattered bilateral ground-glass   nodular opacities are likely on the basis of pneumonia       Labs, X rays reviewed: 12/29/2020    BUN:25>17>19>18  Cr:1.16>0.85>0.85>0.72    WBC:21.6>20.6>16.8>14.5>14.3  Hb:10.8>9>8.5>9.0>9.5  Plat: 339>250>227>273>320    Cultures:  Urine:  · Strept pneumonia antigen negative   · Legionella antigen negative    Blood: 12/25/20- no growth till date. ·   Sputum :  ·   Wound:  ·     Discussed with patient, RN. I have personally reviewed the past medical history, past surgical history, medications, social history, and family history, and I have updated the database accordingly.   Past Medical History:     Past Medical History:   Diagnosis Date    Atherosclerosis     Benign prostatic hyperplasia     Hypertension     Hypothyroidism     Osteoporosis     Paroxysmal A-fib ([address]     Pneumonia     Rhabdomyolysis     Smoking     Spondylolisthesis     Vitamin D deficiency        Past Surgical  History:     Past Surgical History:   Procedure Laterality Date    ANKLE SURGERY      HIP SURGERY      TONSILLECTOMY AND ADENOIDECTOMY         Medications:      warfarin  3 mg Oral Once    enoxaparin  1 mg/kg Subcutaneous BID    dextromethorphan-guaiFENesin  10 mL Oral 4 times per day    cefepime  2 g Intravenous Q12H    warfarin (COUMADIN) daily dosing (placeholder)   Other RX Placeholder    multivitamin  1 tablet Oral Daily    midodrine  5 mg Oral TID WC    sodium chloride flush  10 mL Intravenous 2 times per day    ipratropium-albuterol  1 ampule Inhalation Q4H WA    digoxin  125 mcg Oral Daily    dilTIAZem  120 mg Oral Daily    finasteride  5 mg Oral Daily    levothyroxine  75 mcg Oral Daily    tamsulosin  0.8 mg Oral Nightly       Social History:     Social History     Socioeconomic History    Marital status:      Spouse name: Not on file    Number of children: Not on file    Years of education: Not on file    Highest education level: Not on file   Occupational History    Not on file   Social Needs    Financial resource strain: Not on file    Food insecurity     Worry: Not on file     Inability: Not on file   [employer] needs     Medical: Not on file     Non-medical: Not on file   Tobacco Use    Smoking status: Never Smoker    Smokeless tobacco: Never Used   Substance and Sexual Activity    Alcohol use: Never     Frequency: Never     Binge frequency: Never    Drug use: Never  Sexual activity: Not on file   Lifestyle    Physical activity     Days per week: Not on file     Minutes per session: Not on file    Stress: Not on file   Relationships    Social connections     Talks on phone: Not on file     Gets together: Not on file     Attends Synagogue service: Not on file     Active member of club or organization: Not on file     Attends meetings of clubs or organizations: Not on file     Relationship status: Not on file    Intimate partner violence     Fear of current or ex partner: Not on file     Emotionally abused: Not on file     Physically abused: Not on file     Forced sexual activity: Not on file   Other Topics Concern    Not on file   Social History Narrative    Not on file       Family History:   History reviewed. No pertinent family history. Allergies:   Patient has no known allergies. Review of Systems:   Unable to obtain much history from patient. Physical Examination :     Patient Vitals for the past 8 hrs:   BP Temp Temp src Pulse Resp SpO2 Weight   12/29/20 0834     24     12/29/20 0825 (!) 114/59 97.8 °F (36.6 °C) Oral 89 24 93 % 184 lb 3.2 oz (83.6 kg)   12/29/20 0645       183 lb 6.4 oz (83.2 kg)   12/29/20 0402 111/61   82  92 %    12/29/20 0359  98.1 °F (36.7 °C) Oral       12/29/20 0343      91 %      General Appearance: Awake, alert, oriented to self only,agitated and ill appearing. Head:  Normocephalic, no trauma  Eyes: Pupils equal, round, reactive to light and accommodation; extraocular movements intact; sclera anicteric; conjunctivae pink. No embolic phenomena. ENT: Oropharynx clear, without erythema, exudate, or thrush. No tenderness of sinuses. Mouth/throat: mucosa pink and moist. No lesions. Dentition in good repair. Neck:Supple, without lymphadenopathy. Thyroid normal, No bruits. Pulmonary/Chest: Diffuse crackles on auscultation. No dullness to percussion. Thank you for allowing us to participate in the care of this patient. Please call with questions.     Angie Barahona

## 2020-12-30 NOTE — PLAN OF CARE
BRONCHOSPASM/BRONCHOCONSTRICTION     [x]         IMPROVE AERATION/BREATH SOUNDS  [x]   ADMINISTER BRONCHODILATOR THERAPY AS APPROPRIATE  [x]   ASSESS BREATH SOUNDS  []   IMPLEMENT AEROSOL/MDI PROTOCOL  [x]   PATIENT EDUCATION AS NEEDED         PROVIDE ADEQUATE OXYGENATION WITH ACCEPTABLE SP02/ABG'S    [x]  IDENTIFY APPROPRIATE OXYGEN THERAPY  [x]   MONITOR SP02/ABG'S AS NEEDED   [x]   PATIENT EDUCATION AS NEEDED

## 2020-12-30 NOTE — CONSULTS
Patient Stephanie Barrera   MRN -  [id number]   Acct # - [de-identified]   - 1935        Date of evaluation -  2020    REASON FOR THE CONSULTATION:  Worsening respiratory failure/aspiration pneumonia    HISTORY OF PRESENT ILLNESS:    Asher Bae is a 82y.o. year old male with past medical history significant for paroxysmal atrial fibrillation on warfarin, hypertension, hypothyroidism, dementia initially presented on 2020 from [facility] ER with shortness of breath. Patient uses 4 L NC, EMS was called from the patient's nursing facility due to increased shortness of breath. Patient was recently discharged from hospital on 2020 after treatment of pneumonia and supratherapeutic INR, he was discharged on p.o. doxycycline. On admission he had leukocytosis of 21.6  CT chest-bilateral lower lobe consolidation and scattered bilateral ground glass nodular opacities. Covid 19 -negative  Patient was initially started on nasal cannula, switched to high flow nasal cannula. Currently on high flow nasal cannula FiO2 50%, O2 25 L. Patient reports having worsening shortness of breath, has poor baseline cough. Denies any fever, chills. Immunization     There is no immunization history on file for this patient. PAST MEDICAL HISTORY:       Diagnosis Date    Atherosclerosis     Benign prostatic hyperplasia     Hypertension     Hypothyroidism     Osteoporosis     Paroxysmal A-fib ([address]     Pneumonia     Rhabdomyolysis     Smoking     Spondylolisthesis     Vitamin D deficiency          Family History:   History reviewed. No pertinent family history. SURGICAL HISTORY:   Past Surgical History:   Procedure Laterality Date    ANKLE SURGERY      HIP SURGERY      TONSILLECTOMY AND ADENOIDECTOMY                 TOBACCO:   reports that he has never smoked. He has never used smokeless tobacco.  ETOH:   reports no history of alcohol use.           ALLERGIES:  No Known Allergies    Home Meds:   Prior to Admission medications    Medication Sig Start Date End Date Taking? Authorizing Provider   nystatin (MYCOSTATIN) POWD powder Apply topically 2 times daily groin   Yes Historical Provider, MD   warfarin (COUMADIN) 2 MG tablet Take 1 tablet by mouth daily 12/19 12/20 repeat INR 12/21 12/18/20  Yes VAIBHAV Sutton - CNP   digoxin (LANOXIN) 125 MCG tablet Take 125 mcg by mouth daily   Yes Historical Provider, MD   dilTIAZem (CARDIZEM LA) 120 MG TB24 extended release tablet Take 120 mg by mouth daily   Yes Historical Provider, MD   ergocalciferol (ERGOCALCIFEROL) 1.25 MG (88072 UT) capsule Take 50,000 Units by mouth once a week Saturday 11/14/20 12/25/20 Yes Historical Provider, MD   finasteride (PROSCAR) 5 MG tablet Take 5 mg by mouth daily   Yes Historical Provider, MD   oxyCODONE-acetaminophen (PERCOCET) 5-325 MG per tablet Take 1-2 tablets by mouth every 6 hours.  11/11/20  Yes Historical Provider, MD   tamsulosin (FLOMAX) 0.4 MG capsule Take 0.8 mg by mouth nightly   Yes Historical Provider, MD   levothyroxine (SYNTHROID) 100 MCG tablet Take 75 mcg by mouth daily   Yes Historical Provider, MD   collagenase 250 UNIT/GM ointment Apply 250 g topically daily 11/11/20  Yes Historical Provider, MD   acetaminophen (TYLENOL) 325 MG tablet Take 650 mg by mouth every 4 hours as needed for Pain   Yes Historical Provider, MD   Cholecalciferol (VITAMIN D3) 125 MCG (5000 UT) TABS Take by mouth daily   Yes Historical Provider, MD     CURRENT MEDS :  Scheduled Meds:   warfarin  3 mg Oral Once    enoxaparin  1 mg/kg Subcutaneous BID    ampicillin-sulbactam  3 g Intravenous Q6H    dextromethorphan-guaiFENesin  10 mL Oral 4 times per day    warfarin (COUMADIN) daily dosing (placeholder)   Other RX Placeholder    multivitamin  1 tablet Oral Daily    midodrine  5 mg Oral TID WC    sodium chloride flush  10 mL Intravenous 2 times per day    ipratropium-albuterol  1 ampule Inhalation Q4H WA    digoxin  125 mcg Oral Daily    dilTIAZem  120 mg Oral Daily    finasteride  5 mg Oral Daily    levothyroxine  75 mcg Oral Daily    tamsulosin  0.8 mg Oral Nightly       Continuous Infusions:          REVIEW OF SYSTEMS:  CONSTITUTIONAL:  negative for  fevers, chills  RESPIRATORY:  See hpi  CARDIOVASCULAR:  negative for  chest pain, palpitations, orthopnea, PND  GASTROINTESTINAL:  negative for nausea, vomiting, change in bowel habits, diarrhea, constipation, abdominal pain, pruritus, abdominal mass and abdominal distention  NEUROLOGICAL:  negative for headaches, dizziness, seizures,       Vitals:  BP (!) 107/50   Pulse 83   Temp 97.7 °F (36.5 °C) (Oral)   Resp 16   Ht 6' 3\" (1.905 m)   Wt 186 lb 9.6 oz (84.6 kg)   SpO2 94%   BMI 23.32 kg/m²     PHYSICAL EXAM:  General Appearance:    Alert, cooperative,on high flow    Head:    Normocephalic, without obvious abnormality, atraumatic              Nose:   Nares normal, septum midline, mucosa normal, no drainage        or sinus tenderness   Throat:   Lips, mucosa, and tongue normal;    Neck:   Supple, symmetrical, trachea midline, no adenopathy;     thyroid:  no enlargement/tenderness/nodules;    Back:     Symmetric, no curvature, ROM normal, no CVA tenderness   Lungs:       +rhonchi bilaterally   Chest Wall:    No tenderness or deformity      Heart:    Regular rate and rhythm, S1 and S2 normal, no murmur, rub        or gallop no rvh                           Abdomen:                                                 Pulses:                              Skin:                  Lymph nodes:                                      Soft, non-tender, bowel sounds active all four quadrants,     no masses, no organomegaly         2+ and symmetric all extremities     Skin color, texture, turgor normal, no rashes or lesions       Cervical, supraclavicular not enlarged or matted or tender                           CBC:   Recent Labs     12/28/20  0529 12/29/20  0357 12/30/20  0531   WBC 14.5* 14.3* 12.0*   HGB 9.0* 9.5* 9.6*    320 286     BMP:    Recent Labs     12/28/20  0529 12/29/20  0357 12/30/20  0531    134* 139   K 3.0* 3.8 3.5*   CL 94* 95* 98   CO2 33* 29 26   BUN 19 18 17   CREATININE 0.85 0.72 0.83   GLUCOSE 107* 103* 96     Hepatic: No results for input(s): AST, ALT, ALB, BILITOT, ALKPHOS in the last 72 hours. Amylase: No results found for: AMYLASE  Lipase: No results found for: LIPASE  Troponin: No results for input(s): TROPONINI in the last 72 hours. BNP: No results for input(s): BNP in the last 72 hours. Lipids: No results for input(s): CHOL, HDL in the last 72 hours. Invalid input(s): LDLCALCU  ABGs: No results found for: PHART, PO2ART, NNJ1RSI  INR:   Recent Labs     12/28/20  0529 12/29/20 0357 12/30/20  0531   INR 1.2 1.1 1.2     Thyroid: No results found for: TSH  Urinalysis: No results for input(s): BACTERIA, BLOODU, CLARITYU, COLORU, PHUR, PROTEINU, RBCUA, SPECGRAV, BILIRUBINUR, NITRU, WBCUA, LEUKOCYTESUR, GLUCOSEU in the last 72 hours.           IMPRESSION:    Patient Active Problem List   Diagnosis Code    Elevated INR R79.1    Age-related osteoporosis with current pathological fracture M80.00XA    Atherosclerotic peripheral vascular disease (HCC) I70.209    Closed nondisplaced longitudinal fracture of right patella S82.024A    Pathological compression fracture of lumbar vertebra ([facility] [address] M48.56XA    Hypertension I10    Hypothyroidism E03.9    Benign prostatic hyperplasia N40.0    Paroxysmal atrial fibrillation ([facility]) I48.0    Vitamin D deficiency E55.9    Chronic respiratory failure with hypoxia, on home oxygen therapy (HCC) J96.11, Z99.81    Pneumonia due to infectious organism J18.9    Coagulopathy ([address] D68.9    Community acquired bacterial pneumonia J15.9    Chronic anticoagulation Z79.01    Lactic acidosis E87.2    Sepsis ([facility]) A41.9    Acute respiratory failure with hypoxia ([facility]) J96.01     Assessment:       Acute hypoxic respiratory failure  Pneumonia  Paroxysmal atrial fibrillation  BPH  Hypertension               PLAN:      Continue Unasyn  Encourage incentive spirometry and Acapella usage  F/u CXR  Continue high flow nasal cannula  Supplement oxygen to keep saturation >92%  Continue bronchodilators  PT/OT  DVT prophylaxis      Nathalia Baez MD  Internal Medicine, PGY3  [facility]    Attending Physician Statement  I have discussed the care of Terrell Lopez, including pertinent history and exam findings,  with the resident. I have seen and examined the patient and the key elements of all parts of the encounter have been performed by me. I agree with the assessment, plan and orders as documented by the resident with additions . Left lower lobe aspiration pna   Acute hypoxic respiratory failure   Plan   Cont wean high flow   Aspiration precautions   Is and deep breathing and sec management   Treatment plan Discussed with nursing staff in detail , all questions answered . Electronically signed by Gonzales Laurent MD on   12/30/20 at 3:30 PM EST    Please note that this chart was generated using voice recognition Dragon dictation software. Although every effort was made to ensure the accuracy of this automated transcription, some errors in transcription may have occurred.

## 2020-12-30 NOTE — PROGRESS NOTES
Pt refusing to wear his HFNC, SpO2 dropped into the 70's. Pt states \"It hurts! \" Put pt on 15L NRB mask, SpO2=99%. Sitter at bedside. Will continue to monitor.

## 2020-12-30 NOTE — PROGRESS NOTES
[facility] - Progress Note    Today's Date and Time: 12/30/2020, 8:47 AM    Impression :   · AFib (chronically anticoagulated with warfarin)  · HTN  · BPH  · Hypothyroidism  · Dementia   · Pressure wound on coccyx. · Worsening pneumonia  · Single blood culture with Coagulase negative Staphylococci on 12/15/2020  · Covid tests:  · 12/14/2020 -negative  · 12/25/2020 negative    Recommendations:   · Unasyn 3 g IV q 6hr for aspiration. Stop date 1-5-21  · Diuresis as tolerated  · Monitor clinical response  · Wound care    Medical Decision Making/Summary/Discussion:12/30/2020     ·   Infection Control Recommendations   · Goodman Precautions  · Contact Isolation MRSA    Antimicrobial Stewardship Recommendations     · Simplification of therapy    Coordination of Outpatient Care:   · Estimated Length of IV antimicrobials:TBD  · Patient will need Midline Catheter Insertion: No  · Patient will need PICC line Insertion:No  · Patient will need: Home IV , [place],  SNF,  LTAC:TBD  · Patient will need outpatient wound care:Yes    Chief complaint/reason for consultation:   · Pneumonia  · Sacral decubiti    History of Present Illness:   Gerhard Mariee is a 82y.o.-year-old white male who was initially admitted on 12/25/2020. Patient seen at the request of Dr. Faisal Cota DO.    INITIAL HISTORY:    This is a patient with AFib (chronically anticoagulated with warfarin), HTN, BPH, hypothyroidism, dementia who presents from [facility] ER with shortness of breath. Patient is a poor historian, thus most information is collected from EMR. EMS was called from patient's nursing facility because of increased shortness of breath. Patient uses 4 L oxygen at home,  he was saturating  85% on 4L. Patient was recently discharged from hospital on 12/18/2020 after treatment for pneumonia and supra therapeutic INR. Patient was discharged on PO doxycycline. CXR done at outlying hospital read as worsening pneumonia. WBC elevated at 21.6>20.6, lactic acid 3.5,Trop: 32>30>29, pro-bnp: 488 D-Dimer: 1.04. CT Chest negative for PE and COVID-19 swab negative. Patient was given 1 dose of IV Zosyn and Cipro in the emergency room   Patient was transferred to [facility]. Vs for continued management of Pneumonia.      CURRENT EVALUATION 12/30/20    Afebrile  VS stable    Patient is awake and responds to commands. He remains on high flow at 25 L/Min, 50 % FIO2   02 sat 96%    Pt states his breathing and cough have improved since yesterday. Says he feels better overall. Denies chest pain, NVD, fevers, or chills. CXR 12/17/20  Subtle patchy bilateral lung opacities increased from the prior study are   likely on the basis of pneumonia     CXR 12/26/20  compatible with pneumonia and   some basilar atelectasis/consolidation. CT chest 12/25/20  Bilateral lower lobe consolidation and scattered bilateral ground-glass   nodular opacities are likely on the basis of pneumonia       Labs, X rays reviewed: 12/30/2020    BUN:25>17>18-->11  Cr:1.16>0.85>0.72-->0.83    WBC:21.6>20.6>16.8->14.3-->12.0  Hb:10.8>9>8.5>9.5-->9.6  Plat: 339>250>227>320-->286    Cultures:  Urine:  · Strept pneumonia antigen negative   · Legionella antigen negative    Blood: 12/25/20- no growth till date. ·   Sputum :  ·   Wound:  ·     Discussed with patient, RN        I have personally reviewed the past medical history, past surgical history, medications, social history, and family history, and I have updated the database accordingly.   Past Medical History:     Past Medical History:   Diagnosis Date    Atherosclerosis     Benign prostatic hyperplasia     Hypertension     Hypothyroidism     Osteoporosis     Paroxysmal A-fib ([address]  Pneumonia     Rhabdomyolysis     Smoking     Spondylolisthesis     Vitamin D deficiency        Past Surgical  History:     Past Surgical History:   Procedure Laterality Date    ANKLE SURGERY      HIP SURGERY      TONSILLECTOMY AND ADENOIDECTOMY         Medications:      enoxaparin  1 mg/kg Subcutaneous BID    ampicillin-sulbactam  3 g Intravenous Q6H    dextromethorphan-guaiFENesin  10 mL Oral 4 times per day    warfarin (COUMADIN) daily dosing (placeholder)   Other RX Placeholder    multivitamin  1 tablet Oral Daily    midodrine  5 mg Oral TID WC    sodium chloride flush  10 mL Intravenous 2 times per day    ipratropium-albuterol  1 ampule Inhalation Q4H WA    digoxin  125 mcg Oral Daily    dilTIAZem  120 mg Oral Daily    finasteride  5 mg Oral Daily    levothyroxine  75 mcg Oral Daily    tamsulosin  0.8 mg Oral Nightly       Social History:     Social History     Socioeconomic History    Marital status:      Spouse name: Not on file    Number of children: Not on file    Years of education: Not on file    Highest education level: Not on file   Occupational History    Not on file   Social Needs    Financial resource strain: Not on file    Food insecurity     Worry: Not on file     Inability: Not on file    Transportation needs     Medical: Not on file     Non-medical: Not on file   Tobacco Use    Smoking status: Never Smoker    Smokeless tobacco: Never Used   Substance and Sexual Activity    Alcohol use: Never     Frequency: Never     Binge frequency: Never    Drug use: Never    Sexual activity: Not on file   Lifestyle    Physical activity     Days per week: Not on file     Minutes per session: Not on file    Stress: Not on file   Relationships    Social connections     Talks on phone: Not on file     Gets together: Not on file     Attends Rastafarian service: Not on file     Active member of club or organization: Not on file Attends meetings of clubs or organizations: Not on file     Relationship status: Not on file    Intimate partner violence     Fear of current or ex partner: Not on file     Emotionally abused: Not on file     Physically abused: Not on file     Forced sexual activity: Not on file   Other Topics Concern    Not on file   Social History Narrative    Not on file       Family History:   History reviewed. No pertinent family history. Allergies:   Patient has no known allergies. Review of Systems:   Unable to obtain much history from patient. Physical Examination :     Patient Vitals for the past 8 hrs:   BP Temp Temp src Pulse SpO2   12/30/20 0624 103/68 97.7 °F (36.5 °C) Oral 86 96 %     General Appearance: Awake, alert, oriented to self only,agitated and ill appearing. Head:  Normocephalic, no trauma  Eyes: Pupils equal, round, reactive to light and accommodation; extraocular movements intact; sclera anicteric; conjunctivae pink. No embolic phenomena. ENT: Oropharynx clear, without erythema, exudate, or thrush. No tenderness of sinuses. Mouth/throat: mucosa pink and moist. No lesions. Dentition in good repair. Neck:Supple, without lymphadenopathy. Thyroid normal, No bruits. Pulmonary/Chest: Clear to auscultation, has diffuse crackles on auscultation (improved compared to yesterday). No dullness to percussion. Cardiovascular: irregular rhythm without murmurs, rubs, or gallops. Abdomen: Soft, non tender. Bowel sounds normal. No organomegaly  All four Extremities: No cyanosis, clubbing, edema, or effusions. Neurologic: No gross sensory or motor deficits. Skin: Warm and dry with good turgor. No signs of peripheral arterial or venous insufficiency. No ulcerations. No open wounds.     Medical Decision Making -Laboratory:   I have independently reviewed/ordered the following labs:    CBC with Differential:   Recent Labs     12/29/20  0357 12/30/20  0531   WBC 14.3* 12.0*   HGB 9.5* 9.6* HCT 32.1* 31.4*    286   LYMPHOPCT 8* 11*   MONOPCT 3 3     BMP:   Recent Labs     20  0357 20  0531   * 139   K 3.8 3.5*   CL 95* 98   CO2 29 26   BUN 18 17   CREATININE 0.72 0.83   MG 2.1 2.3     Hepatic Function Panel:   Recent Labs     20  0357 20  0531   LABALBU 2.2* 2.0*     No results for input(s): RPR in the last 72 hours. No results for input(s): HIV in the last 72 hours. No results for input(s): BC in the last 72 hours. Lab Results   Component Value Date    MUCUS NOT REPORTED 2020    RBC 3.31 2020    TRICHOMONAS NOT REPORTED 2020    WBC 12.0 2020    YEAST NOT REPORTED 2020    TURBIDITY CLEAR 2020     Lab Results   Component Value Date    CREATININE 0.83 2020    GLUCOSE 96 2020       Medical Decision Making-Imagin/26/20    Medical Decision Dqegjm-Kyzfhmhg-Rwzut:       Medical Decision Making-Other:     Note:  · Labs, medications, radiologic studies were reviewed with personal review of films  · Large amounts of data were reviewed  · Discussed with nursing Staff, Discharge planner  · Infection Control and Prevention measures reviewed  · All prior entries were reviewed  · Administer medications as ordered  · Prognosis: Guarded  · Discharge planning reviewed  · Follow up as outpatient. Thank you for allowing us to participate in the care of this patient. Please call with questions. Zacarias Arnold  [id number]    ATTESTATION:    I have discussed the case, including pertinent history and exam findings with the residents and students. I have seen and examined the patient and the key elements of the encounter have been performed by me. I was present when the student obtained his information or examined the patient. I have reviewed the laboratory data, other diagnostic studies and discussed them with the residents. I have updated the medical record where necessary. I agree with the assessment, plan and orders as documented by the resident/ student.     Sonal Mac MD.

## 2020-12-30 NOTE — PROGRESS NOTES
[facility]  Office: [address], DO, Scarlet Ceja, DO, Oren Arce, DO, Michelle Cheng, DO, Demetrio Hackett MD, Jacinto Schwartz MD, Celina Kumar MD, Xochitl Hurley MD, Elif Meyers MD, Cornell Santana MD, Roxy Ferrer MD, Jas Willard MD, Leandra Duque MD, Elizabeth Kim DO, Bernardino Sherman MD, Teresita Nair MD, Joaquin White DO, Flynn Gomez MD,  Ruel Mart DO, Reese Grimes MD, Morgan Caldera MD, William [facility], [facility] Hospital[facility] Dominguez, CNP, Rachel Saavedra, CNP, Prabhu Patel, CNS, Carolyn Lam, CNP, Elias Gilmore, CNP, Vero Kruger, CNP, Ernie Menjivar, CNP, Jenise Nava, CNP, Lucille Sharp PA-C, Hilary Busby [facility], Carolyn Cook, CNP, Bj Maldonado, CNP, Darius Vergara, CNP, Vivi Lou, [facility], Daquan Gunn, [address] [place] [address]    Progress Note    12/30/2020    9:42 AM    Name:   Manuel Carrera  MRN:     [id number]     Acct:      [de-identified]   Room:   [facility] Day:  5  Admit Date:  12/25/2020 11:29 AM    PCP:   Amanda Newell MD  Code Status:  Full Code    Subjective:     C/C:   Chief Complaint   Patient presents with    Respiratory Distress      Interval History Status: not changed. Pt seen and examined this morning  Resting comfortably in bed at this time  Denies having any new complaints or concerns at this time          Review of Systems:     12 point ROS performed today and negative for anything other than what was stated in subjective     Medications:      Allergies:  No Known Allergies    Current Meds:   Scheduled Meds:    warfarin  3 mg Oral Once    enoxaparin  1 mg/kg Subcutaneous BID    ampicillin-sulbactam  3 g Intravenous Q6H    dextromethorphan-guaiFENesin  10 mL Oral 4 times per day    warfarin (COUMADIN) daily dosing (placeholder)   Other RX Placeholder    multivitamin  1 tablet Oral Daily    midodrine  5 mg Oral TID WC    sodium chloride flush  10 mL Intravenous 2 times per day    ipratropium-albuterol  1 ampule Inhalation Q4H WA    digoxin  125 mcg Oral Daily    dilTIAZem  120 mg Oral Daily    finasteride  5 mg Oral Daily    levothyroxine  75 mcg Oral Daily    tamsulosin  0.8 mg Oral Nightly     Continuous Infusions:   PRN Meds: potassium chloride **OR** potassium alternative oral replacement **OR** potassium chloride, nicotine, promethazine **OR** ondansetron, magnesium hydroxide, acetaminophen **OR** acetaminophen, albuterol    Data:     Past Medical History:   has a past medical history of Atherosclerosis, Benign prostatic hyperplasia, Hypertension, Hypothyroidism, Osteoporosis, Paroxysmal A-fib ([address] Pneumonia, Rhabdomyolysis, Smoking, Spondylolisthesis, and Vitamin D deficiency. Social History:   reports that he has never smoked. He has never used smokeless tobacco. He reports that he does not drink alcohol or use drugs. Family History: History reviewed. No pertinent family history. Vitals:  /68   Pulse 95   Temp 97.7 °F (36.5 °C) (Oral)   Resp 16   Ht 6' 3\" (1.905 m)   Wt 186 lb 9.6 oz (84.6 kg)   SpO2 94%   BMI 23.32 kg/m²   Temp (24hrs), Av.1 °F (36.7 °C), Min:97.7 °F (36.5 °C), Max:98.6 °F (37 °C)    Recent Labs     20  0819 20  1253 20  1727   POCGLU 127* 98 126*       I/O (24Hr):     Intake/Output Summary (Last 24 hours) at 2020 0942  Last data filed at 2020 1717  Gross per 24 hour   Intake 720 ml   Output --   Net 720 ml       Labs:  Hematology:  Recent Labs     20  0529 20  0357 20  0531   WBC 14.5* 14.3* 12.0*   RBC 3.05* 3.31* 3.31*   HGB 9.0* 9.5* 9.6*   HCT 28.5* 32.1* 31.4*   MCV 93.4 97.0 94.9   MCH 29.5 28.7 29.0   MCHC 31.6 29.6 30.6   RDW 19.7* 19.8* 19.5*    320 286   MPV 11.0 10.9 10.6   INR 1.2 1.1 1.2     Chemistry:  Recent Labs     20  0529 20  0529 20  0357 20  0959 20  2234 20  0531     --  134*  --   --  139   K 3.0* --  3.8  --   --  3.5*   CL 94*  --  95*  --   --  98   CO2 33*  --  29  --   --  26   GLUCOSE 107*  --  103*  --   --  96   BUN 19  --  18  --   --  17   CREATININE 0.85  --  0.72  --   --  0.83   MG 2.0  --  2.1  --   --  2.3   ANIONGAP 9  --  10  --   --  15   LABGLOM >60  --  >60  --   --  >60   GFRAA >60  --  >60  --   --  >60   CALCIUM 8.7  --  8.8  --   --  8.8   PHOS 3.0  --  2.5  --   --  2.8   TROPHS  --    < > 38* 40* 43* 45*    < > = values in this interval not displayed. Recent Labs     12/28/20  0529 12/29/20  0357 12/29/20  0819 12/29/20  1253 12/29/20  1727 12/30/20  0531   LABALBU 2.2* 2.2*  --   --   --  2.0*   POCGLU  --   --  127* 98 126*  --      ABG:No results found for: POCPH, PHART, PH, POCPCO2, LQN9IOW, PCO2, POCPO2, PO2ART, PO2, POCHCO3, YBX3BEV, HCO3, NBEA, PBEA, BEART, BE, THGBART, THB, RBG7VDK, KGHQ3TYW, R9XJVGIV, O2SAT, FIO2  Lab Results   Component Value Date/Time    SPECIAL NOT REPORTED 12/26/2020 05:12 PM     Lab Results   Component Value Date/Time    CULTURE PRESUMPTIVE CANDIDA ALBICANS 10 to 50,000 CFU/ML (A) 12/26/2020 05:12 PM       Radiology:  [facility] Chest (2 Vw)    Result Date: 12/26/2020  Similar findings to those seen on recent CT, compatible with pneumonia and some basilar atelectasis/consolidation. Xr Chest Portable    Result Date: 12/27/2020  Persistent findings of aspiration or pneumonia near the bases, appearing worsened in the right base and decreased in the lingula. Xr Chest Portable    Result Date: 12/25/2020  Subtle patchy bilateral lung opacities increased from the prior study are likely on the basis of pneumonia. Ct Chest Pulmonary Embolism W Contrast    Result Date: 12/25/2020  No central or segmental pulmonary embolus. Bilateral lower lobe consolidation and scattered bilateral ground-glass nodular opacities are likely on the basis of pneumonia. Recommend follow-up.        Physical Examination:        General appearance: Chronically ill-appearing male resting comfortably in bed in no acute distress  Mental Status: comfortable oriented x2  Lungs:  Course to auscultation the lower lobes bilaterally with diminished breath sounds at the bases    Heart:  regular rate and rhythm, no murmur  Abdomen:  soft, nontender, nondistended, normal bowel sounds, no masses, hepatomegaly, splenomegaly +sacral ulcer  Extremities:  no edema, redness, tenderness in the calves  Skin:  no gross lesions, rashes, induration    Assessment:        Hospital Problems           Last Modified POA    * (Principal) Community acquired bacterial pneumonia 12/26/2020 Yes    Hypertension 12/25/2020 Yes    Hypothyroidism 12/25/2020 Yes    Benign prostatic hyperplasia 12/25/2020 Yes    Paroxysmal atrial fibrillation ([address] 12/25/2020 Yes    Chronic anticoagulation 12/25/2020 Yes    Lactic acidosis 12/26/2020 Yes    Sepsis ([address] 12/26/2020 Yes    Acute respiratory failure with hypoxia ([address] 12/27/2020 Yes          Plan:        1. Acute hypoxic respiratory failure   - 2/2 pneumonia, atelectasis,volume overload and aspiration   - Cultures remain negative. - continue to wean high flow oxygen via nasal cannula   - required increase in FiO2 yesterday, currently 30L, 60%   - antibiotic regimen changed to unasyn 3g IV Q6Hr (day 1)  - ID on board   - Speech consulted, aspiration precautions. Recommend dental soft diet with nectar   - Continue incentive spirometry, Duo neb's. - chest xray done on 12/27 showed persistent findings of aspiration or pneumonia near the bases appearing worsened in the right base   - legionella negative  - strep pneumo negative   - COVID negative   - pulmonary consult placed for recommendations      2. Acute Hypokalemia:   - replace     3. Abnormal UA:   - Candiduria likely colonization.      4. Paroxysmal atrial fibrillation:   - Continue Cardizem and Coumadin   - telemetry   - INR currently 1.2. Lovenox for bridging      5.  Hypothyroidism:   - Continue Synthroid 75 mcg daily     6. BPH:   - Continue Flomax   - Bladder scan q shift, straight cath for urinary retention greater than 250 cc     7. NCNC anemia:  - stable   - currently improved at 9.6  - continue to monitor      8.  DISPO:   - Pending clinical improvement   - PT/OT consulted     Kaleigh Romano MD  12/30/2020  9:42 AM

## 2020-12-30 NOTE — PROGRESS NOTES
Pharmacy Note  Warfarin Consult follow-up      Recent Labs     12/30/20  0531   INR 1.2     Recent Labs     12/28/20  0529 12/29/20  0357 12/30/20  0531   HGB 9.0* 9.5* 9.6*   HCT 28.5* 32.1* 31.4*    320 286       Significant Drug-Drug Interactions:  New warfarin drug-drug interactions: none  Discontinued drug-drug interactions: none  Current warfarin drug-drug interactions: acetaminophen, Unasyn, enoxaparin, levothyroxine    Date INR Dose   12/25 2.1 None   12/26 2.1 2 mg    12/27 1.4 5 mg   12/28/2020 1.2 3mg   12/29/2020 1.1 3mg   12/30/2020 1.2 3 mg        Notes:        INR remains sub therapeutic today. Will order warfarin 3 mg an increase from his home dose of 2 mg. Daily PT/INR while inpatient. [address] [place] [address]  Ph., CACP, Clinical Pharmacist  Anticoagulation Services, [facility]  12/30/2020  9:04 AM

## 2020-12-30 NOTE — PROGRESS NOTES
[facility] - Progress Note    Today's Date and Time: 12/30/2020, 2:36 PM    Impression :   · AFib (chronically anticoagulated with warfarin)  · HTN  · BPH  · Hypothyroidism  · Dementia   · Pressure wound on coccyx. · Worsening pneumonia  · Single blood culture with Coagulase negative Staphylococci on 12/15/2020  · Covid tests:  · 12/14/2020 -negative  · 12/25/2020 negative    Recommendations:   · Unasyn 3 g IV q 6hr for aspiration. Stop date 1-5-21  · Diuresis as tolerated  · Monitor clinical response  · Wound care    Medical Decision Making/Summary/Discussion:12/30/2020     ·   Infection Control Recommendations   · Winston Precautions  · Contact Isolation MRSA    Antimicrobial Stewardship Recommendations     · Simplification of therapy    Coordination of Outpatient Care:   · Estimated Length of IV antimicrobials:TBD  · Patient will need Midline Catheter Insertion: No  · Patient will need PICC line Insertion:No  · Patient will need: Home IV , [place],  SNF,  LTAC:TBD  · Patient will need outpatient wound care:Yes    Chief complaint/reason for consultation:   · Pneumonia  · Sacral decubiti    History of Present Illness:   Lisa Bhakta is a 82y.o.-year-old white male who was initially admitted on 12/25/2020. Patient seen at the request of Dr. Bal Little DO.    INITIAL HISTORY:    This is a patient with AFib (chronically anticoagulated with warfarin), HTN, BPH, hypothyroidism, dementia who presents from [facility] ER with shortness of breath. Patient is a poor historian, thus most information is collected from EMR. EMS was called from patient's nursing facility because of increased shortness of breath. Patient uses 4 L oxygen at home,  he was saturating  85% on 4L. Patient was recently discharged from hospital on 12/18/2020 after treatment for pneumonia and supra therapeutic INR. Patient was discharged on PO doxycycline.   CXR done at [facility] read as worsening pneumonia. WBC elevated at 21.6>20.6, lactic acid 3.5,Trop: 32>30>29, pro-bnp: 488 D-Dimer: 1.04. CT Chest negative for PE and COVID-19 swab negative. Patient was given 1 dose of IV Zosyn and Cipro in the emergency room   Patient was transferred to [facility]. Vs for continued management of Pneumonia.      CURRENT EVALUATION 12/30/20    Afebrile  VS stable    The patient seen and evaluated at bedside. Patient is better with no new acute issues or concerns today. Patient is awake and responds to commands. He remains on high flow at 25 L/Min, 50 % FIO2   RR 16  02 sat 96-->94 %    Pt states his breathing and cough have improved since yesterday. Says he feels better overall. Denies chest pain, NVD, fevers, or chills. CXR 12/17/20  Subtle patchy bilateral lung opacities increased from the prior study are   likely on the basis of pneumonia     CXR 12/26/20  compatible with pneumonia and   some basilar atelectasis/consolidation. CT chest 12/25/20  Bilateral lower lobe consolidation and scattered bilateral ground-glass   nodular opacities are likely on the basis of pneumonia       Labs, X rays reviewed: 12/30/2020    BUN:25>17>18-->11  Cr:1.16>0.85>0.72-->0.83    WBC:21.6>20.6>16.8->14.3-->12.0  Hb:10.8>9>8.5>9.5-->9.6  Plat: 339>250>227>320-->286    Cultures:  Urine:  · Strept pneumonia antigen negative   · Legionella antigen negative    Blood: 12/25/20- no growth till date. ·   Sputum :  ·   Wound:  ·     Discussed with patient, RN        I have personally reviewed the past medical history, past surgical history, medications, social history, and family history, and I have updated the database accordingly.   Past Medical History:     Past Medical History:   Diagnosis Date    Atherosclerosis     Benign prostatic hyperplasia     Hypertension     Hypothyroidism     Osteoporosis     Paroxysmal A-fib ([address]     Pneumonia     Rhabdomyolysis     Smoking     Spondylolisthesis     Vitamin D deficiency        Past Surgical  History:     Past Surgical History:   Procedure Laterality Date    ANKLE SURGERY      HIP SURGERY      TONSILLECTOMY AND ADENOIDECTOMY         Medications:      warfarin  3 mg Oral Once    enoxaparin  1 mg/kg Subcutaneous BID    ampicillin-sulbactam  3 g Intravenous Q6H    dextromethorphan-guaiFENesin  10 mL Oral 4 times per day    warfarin (COUMADIN) daily dosing (placeholder)   Other RX Placeholder    multivitamin  1 tablet Oral Daily    midodrine  5 mg Oral TID WC    sodium chloride flush  10 mL Intravenous 2 times per day    ipratropium-albuterol  1 ampule Inhalation Q4H WA    digoxin  125 mcg Oral Daily    dilTIAZem  120 mg Oral Daily    finasteride  5 mg Oral Daily    levothyroxine  75 mcg Oral Daily    tamsulosin  0.8 mg Oral Nightly       Social History:     Social History     Socioeconomic History    Marital status:      Spouse name: Not on file    Number of children: Not on file    Years of education: Not on file    Highest education level: Not on file   Occupational History    Not on file   Social Needs    Financial resource strain: Not on file    Food insecurity     Worry: Not on file     Inability: Not on file    Transportation needs     Medical: Not on file     Non-medical: Not on file   Tobacco Use    Smoking status: Never Smoker    Smokeless tobacco: Never Used   Substance and Sexual Activity    Alcohol use: Never     Frequency: Never     Binge frequency: Never    Drug use: Never    Sexual activity: Not on file   Lifestyle    Physical activity     Days per week: Not on file     Minutes per session: Not on file    Stress: Not on file   Relationships    Social connections     Talks on phone: Not on file     Gets together: Not on file     Attends Buddhist service: Not on file     Active member of club or organization: Not on file     Attends meetings of clubs or organizations: Not on file     Relationship status: Not on file    Intimate partner violence     Fear of current or ex partner: Not on file     Emotionally abused: Not on file     Physically abused: Not on file     Forced sexual activity: Not on file   Other Topics Concern    Not on file   Social History Narrative    Not on file       Family History:   History reviewed. No pertinent family history. Allergies:   Patient has no known allergies. Review of Systems:   Unable to obtain much history from patient. Physical Examination :     Patient Vitals for the past 8 hrs:   BP Pulse Resp SpO2   12/30/20 1302 -- 83 16 94 %   12/30/20 1246 (!) 107/50 77 20 95 %   12/30/20 1204 -- -- -- 99 %   12/30/20 1100 -- -- -- 100 %   12/30/20 0913 -- 95 16 94 %     General Appearance: Awake, alert, oriented to self only,agitated and ill appearing. Head:  Normocephalic, no trauma  Eyes: Pupils equal, round, reactive to light and accommodation; extraocular movements intact; sclera anicteric; conjunctivae pink. No embolic phenomena. ENT: Oropharynx clear, without erythema, exudate, or thrush. No tenderness of sinuses. Mouth/throat: mucosa pink and moist. No lesions. Dentition in good repair. Neck:Supple, without lymphadenopathy. Thyroid normal, No bruits. Pulmonary/Chest: Clear to auscultation, has diffuse crackles on auscultation (improved compared to yesterday). No dullness to percussion. Cardiovascular: irregular rhythm without murmurs, rubs, or gallops. Abdomen: Soft, non tender. Bowel sounds normal. No organomegaly  All four Extremities: No cyanosis, clubbing, edema, or effusions. Neurologic: No gross sensory or motor deficits. Skin: Warm and dry with good turgor. No signs of peripheral arterial or venous insufficiency. No ulcerations. No open wounds.     Medical Decision Making -Laboratory:   I have independently reviewed/ordered the following labs:    CBC with Differential:   Recent Labs     12/29/20  0357 12/30/20  0531   WBC 14.3* 12.0*   HGB 9.5* 9.6*   HCT 32.1* 31.4*    286   LYMPHOPCT 8* 11*   MONOPCT 3 3     BMP:   Recent Labs     20  0357 20  0531   * 139   K 3.8 3.5*   CL 95* 98   CO2 29 26   BUN 18 17   CREATININE 0.72 0.83   MG 2.1 2.3     Hepatic Function Panel:   Recent Labs     20  0357 20  0531   LABALBU 2.2* 2.0*     No results for input(s): RPR in the last 72 hours. No results for input(s): HIV in the last 72 hours. No results for input(s): BC in the last 72 hours. Lab Results   Component Value Date    MUCUS NOT REPORTED 2020    RBC 3.31 2020    TRICHOMONAS NOT REPORTED 2020    WBC 12.0 2020    YEAST NOT REPORTED 2020    TURBIDITY CLEAR 2020     Lab Results   Component Value Date    CREATININE 0.83 2020    GLUCOSE 96 2020       Medical Decision Making-Imagin/26/20    Medical Decision Iqamaj-[id number]-Ptjks:       Medical Decision Making-Other:     Note:  · Labs, medications, radiologic studies were reviewed with personal review of films  · Large amounts of data were reviewed  · Discussed with nursing Staff, Discharge planner  · Infection Control and Prevention measures reviewed  · All prior entries were reviewed  · Administer medications as ordered  · Prognosis: Guarded  · Discharge planning reviewed  · Follow up as outpatient. Thank you for allowing us to participate in the care of this patient. Please call with questions. Horacio De Leon, MS4 participated in the evaluation of this patient under my direct supervision.       Rayne Murry MD

## 2020-12-30 NOTE — PROGRESS NOTES
Occupational Therapy  Facility/Department: [address] ORTHO/MED SURG  Daily Treatment Note  NAME: Lawyer Hubbard  : 1935  MRN: [id number]    Date of Service: 2020    Discharge Recommendations:  Patient would benefit from continued therapy after discharge in order to increase pt participation, independence and safety. Assessment   Performance deficits / Impairments: Decreased functional mobility ; Decreased ADL status; Decreased strength;Decreased safe awareness;Decreased cognition;Decreased endurance;Decreased balance;Decreased high-level IADLs  Prognosis: Fair  OT Education: OT Role  Patient Education: purpose of therapy and importance of activity  Barriers to Learning: Pt declined any EOB or OOB activity  REQUIRES OT FOLLOW UP: Yes  Activity Tolerance  Activity Tolerance: Patient limited by fatigue;Treatment limited secondary to decreased cognition  Safety Devices  Safety Devices in place: Yes  Type of devices: Patient at risk for falls; Left in bed;Call light within reach; Bed alarm in place         Patient Diagnosis(es): The primary encounter diagnosis was Pneumonia due to organism. Diagnoses of Warfarin-induced coagulopathy ([address] and Elevated troponin were also pertinent to this visit. has a past medical history of Atherosclerosis, Benign prostatic hyperplasia, Hypertension, Hypothyroidism, Osteoporosis, Paroxysmal A-fib ([address] Pneumonia, Rhabdomyolysis, Smoking, Spondylolisthesis, and Vitamin D deficiency. has a past surgical history that includes Ankle surgery; hip surgery; and Tonsillectomy and adenoidectomy.     Restrictions  Restrictions/Precautions  Restrictions/Precautions: General Precautions, Fall Risk, Up as Tolerated  Required Braces or Orthoses?: No  Position Activity Restriction  Other position/activity restrictions: High-flow per RN pt refusing hi flow on 15L non rebreahter  Subjective   General  Chart Reviewed: Yes  Patient assessed for rehabilitation services?: Yes  Family / Caregiver Present: No  General Comment  Comments: RN and pt agreeable to therapy  Vital Signs  Patient Currently in Pain: Denies   Orientation  Orientation  Overall Orientation Status: (KAMARI pt grunts but refuses to talk)  Objective    ADL  Grooming: Setup;Verbal cueing; Increased time to complete;Minimal assistance(face washing completed supine in bed req tactile/verbal cues req assist for thoroughness)  Additional Comments: Further ADLs not attempted sec to pt refusing any OOB activity  Balance  Sitting Balance: Unable to assess(comment)(Pt declined after max encouragement)  Cognition  Overall Cognitive Status: Exceptions  Arousal/Alertness: Delayed responses to stimuli  Following Commands: Follows one step commands with repetition; Follows one step commands with increased time  Attention Span: Attends with cues to redirect  Safety Judgement: Decreased awareness of need for safety;Decreased awareness of need for assistance  Problem Solving: Assistance required to correct errors made;Assistance required to identify errors made;Decreased awareness of errors  Insights: Decreased awareness of deficits  Initiation: Requires cues for some  Sequencing: Requires cues for some  Cognition Comment: pt following commands of grooming this day     Pt agreeable to activity upon LEMOS entrance. ADL task of grooming completed supine in bed see above for LOF. LEMOS instructed pt to sit at EOB pt adamantly refused. Session ended. At session end pt supine in bed with call light in reach, sitter present and bed alarm on.   Plan   Plan  Times per week: 3-4 x/wk  Current Treatment Recommendations: Balance Training, Functional Mobility Training, Endurance Training, Safety Education & Training, Self-Care / ADL, Equipment Evaluation, Education, & procurement, Patient/Caregiver Education & Training, Cognitive Reorientation   Cont POC    Goals  Short term goals  Time Frame for Short term goals: By discharge, pt will:  Short term goal 1: Demo Min A for functional tranfers and functional mobility with use of LRD PRN  Short term goal 2: Demo I with setup for UB ADLs and grooming tasks  Short term goal 3: Demo CGA with setup for LB ADLs and toileting tasks  Short term goal 4: Demo 10+ minutes dynamic standing task to increase safety and independence with ADLs/IADLs  Short term goal 5: Follow 75% commands to increase participation with ADLs/IADLs  Short term goal 6: Demo good safety awareness throughout therapy session with <5 VCs       Therapy Time   Individual Concurrent Group Co-treatment   Time In 1047         Time Out 1056         Minutes 9         Timed Code Treatment Minutes: [facility], LEMOS/L

## 2020-12-30 NOTE — CARE COORDINATION
Transition planning  Received call from [place] from [facility] will start precert today.

## 2020-12-31 PROBLEM — E44.0 MODERATE MALNUTRITION (HCC): Status: ACTIVE | Noted: 2020-01-01

## 2020-12-31 NOTE — PROGRESS NOTES
Physical Therapy  Facility/Department: [address] ORTHO/MED SURG  Daily Treatment Note  NAME: Fabio Wayne  : 1935  MRN: [id number]    Date of Service: 2020    Discharge Recommendations:  Patient would benefit from continued therapy after discharge        Assessment   Body structures, Functions, Activity limitations: Decreased functional mobility ; Decreased strength;Decreased endurance;Decreased balance  Assessment: Able to ambulate a few feet, bound by length of high-flow tubing. Poor cognition/ poor safety awareness. Patient needs further PT to regain functional independence. PT Education: PT Role;Plan of Care;General Safety;Goals;Transfer Training;Gait Training;Home Exercise Program;Functional Mobility Training  REQUIRES PT FOLLOW UP: Yes  Activity Tolerance  Activity Tolerance: Patient limited by fatigue     Patient Diagnosis(es): The primary encounter diagnosis was Pneumonia due to organism. Diagnoses of Warfarin-induced coagulopathy ([address] and Elevated troponin were also pertinent to this visit. has a past medical history of Atherosclerosis, Benign prostatic hyperplasia, Hypertension, Hypothyroidism, Osteoporosis, Paroxysmal A-fib ([address] Pneumonia, Rhabdomyolysis, Smoking, Spondylolisthesis, and Vitamin D deficiency. has a past surgical history that includes Ankle surgery; hip surgery; and Tonsillectomy and adenoidectomy. Restrictions  Restrictions/Precautions  Restrictions/Precautions: Fall Risk, Up as Tolerated  Required Braces or Orthoses?: No  Position Activity Restriction  Other position/activity restrictions: High-flow. Has a sitter today. Subjective   General  Chart Reviewed: Yes  Response To Previous Treatment: Patient with no complaints from previous session.   Family / Caregiver Present: Yes(sitter)  Pain Screening  Patient Currently in Pain: Denies  Vital Signs  BP: (!) 110/57  Patient Currently in Pain: Denies  Oxygen Therapy  SpO2: 91 %(on high flow)            Cognition Cognition  Overall Cognitive Status: Exceptions  Arousal/Alertness: Delayed responses to stimuli  Following Commands: Follows one step commands with repetition; Follows one step commands with increased time  Attention Span: Attends with cues to redirect  Safety Judgement: Decreased awareness of need for safety;Decreased awareness of need for assistance  Problem Solving: Assistance required to correct errors made;Assistance required to identify errors made;Decreased awareness of errors  Insights: Decreased awareness of deficits  Initiation: Requires cues for some  Sequencing: Requires cues for some  Objective   Bed mobility  Supine to Sit: Minimal assistance  Sit to Supine: Minimal assistance  Transfers  Sit to Stand: 2 Person Assistance;Minimal Assistance  Stand to sit: Minimal Assistance;2 Person Assistance  Ambulation  Ambulation?: Yes  Ambulation 1  Surface: level tile  Device: Rolling Walker  Other Apparatus: O2  Assistance: Minimal assistance  Gait Deviations: Slow Chiara;Decreased step length;Decreased step height  Distance: 4' forward, 4' backwards and then again. After a seated rest, 4' forward, 4' backwards x1. Comments: Difficulty understanding/demonstrating small steps backwards, keep walker close. Balance  Sitting - Static: Good  Sitting - Dynamic: Good;-  Standing - Static: Fair;-  Standing - Dynamic: Fair;-  Exercises  Comments:  Ankle pumps, SAQ, hip abd/add x10 reps                        Goals  Short term goals  Time Frame for Short term goals: 14 visits  Short term goal 1: Pt will be Inessa bed mobility  Short term goal 2: Pt will be Inessa transfers  Short term goal 3: Pt will be [place]essa amb 240' RW or least restrictive AD  Short term goal 4: Pt will navigate 2 steps Inessa    Plan    Plan  Times per week: 4- 5x/wk  Times per day: Daily  Current Treatment Recommendations: Strengthening, Endurance Training, Functional Mobility Training, Transfer Training, Gait Training, Balance Training, Stair training, Home Exercise Program, Safety Education & Training, Patient/Caregiver Education & Training, Equipment Evaluation, Education, & procurement  Safety Devices  Type of devices: Call light within reach, Left in bed, Gait belt, All fall risk precautions in place, Nurse notified, Sitter present  Restraints  Initially in place: No     Therapy Time   Individual Concurrent Group Co-treatment   Time In 1405         Time Out 1430         Minutes 25         Timed Code Treatment Minutes: Christal 4340, PT

## 2020-12-31 NOTE — PROGRESS NOTES
[facility] - Progress Note    Today's Date and Time: 12/31/2020, 9:34 AM    Impression :   · AFib (chronically anticoagulated with warfarin)  · HTN  · BPH  · Hypothyroidism  · Dementia   · Pressure wound on coccyx. · Worsening pneumonia  · Single blood culture with Coagulase negative Staphylococci on 12/15/2020  · Covid tests:  · 12/14/2020 -negative  · 12/25/2020 negative    Recommendations:   · Unasyn 3 g IV q 6hr for aspiration. Stop date 1-5-21  · Diuresis as tolerated  · Monitor clinical response  · Wound care    Medical Decision Making/Summary/Discussion:12/31/2020     ·   Infection Control Recommendations   · [place] Precautions  · Contact Isolation MRSA    Antimicrobial Stewardship Recommendations     · Simplification of therapy    Coordination of Outpatient Care:   · Estimated Length of IV antimicrobials:TBD  · Patient will need Midline Catheter Insertion: No  · Patient will need PICC line Insertion:No  · Patient will need: Home IV , [place],  SNF,  LTAC:TBD  · Patient will need outpatient wound care:Yes    Chief complaint/reason for consultation:   · Pneumonia  · Sacral decubiti    History of Present Illness:   Justyna Whitehead is a 82y.o.-year-old white male who was initially admitted on 12/25/2020. Patient seen at the request of Dr. Shayan Bruce DO.    INITIAL HISTORY:    This is a patient with AFib (chronically anticoagulated with warfarin), HTN, BPH, hypothyroidism, dementia who presents from [facility] ER with shortness of breath. Patient is a poor historian, thus most information is collected from EMR. EMS was called from patient's nursing facility because of increased shortness of breath. Patient uses 4 L oxygen at home,  he was saturating  85% on 4L. Patient was recently discharged from hospital on 12/18/2020 after treatment for pneumonia and supra therapeutic INR. Patient was discharged on PO doxycycline. [facility]. WBC elevated at 21.6>20.6, lactic acid 3.5,Trop: 32>30>29, pro-bnp: 488 D-Dimer: 1.04. CT Chest negative for PE and COVID-19 swab negative. Patient was given 1 dose of IV Zosyn and Cipro in the emergency room   Patient was transferred to [facility]. Vs for continued management of Pneumonia.      CURRENT EVALUATION 12/31/20    Afebrile  VS stable    The patient seen and evaluated at bedside. Patient is better with no new acute issues or concerns today. Pt is awake and alert. States his breathing and cough have improved. Feels better overall. Diffuse crackles on lung exam.    Denies chest pain, NVD, fevers, or chills. CXR 12/17/20  Subtle patchy bilateral lung opacities increased from the prior study are   likely on the basis of pneumonia     CXR 12/26/20  compatible with pneumonia and   some basilar atelectasis/consolidation. CT chest 12/25/20  Bilateral lower lobe consolidation and scattered bilateral ground-glass   nodular opacities are likely on the basis of pneumonia       Labs, X rays reviewed: 12/31/2020    BUN:25>17>18-->11  Cr:1.16>0.85>0.72-->0.83    WBC:21.6>20.6>16.8->14.3-->12.0-->9.2  Hb:10.8>9>8.5>9.5-->9.6-->9.5  Plat: 339>250>227>320-->286-->257    Cultures:  Urine:  · Strept pneumonia antigen negative   · Legionella antigen negative    Blood: 12/25/20- no growth till date. ·   Sputum :  ·   Wound:  ·     Discussed with patient, RN        I have personally reviewed the past medical history, past surgical history, medications, social history, and family history, and I have updated the database accordingly.   Past Medical History:     Past Medical History:   Diagnosis Date    Atherosclerosis     Benign prostatic hyperplasia     Hypertension  Hypothyroidism     Osteoporosis     Paroxysmal A-fib (HCC)     Pneumonia     Rhabdomyolysis     Smoking     Spondylolisthesis     Vitamin D deficiency        Past Surgical  History:     Past Surgical History:   Procedure Laterality Date    ANKLE SURGERY      HIP SURGERY      TONSILLECTOMY AND ADENOIDECTOMY         Medications:      enoxaparin  1 mg/kg Subcutaneous BID    ampicillin-sulbactam  3 g Intravenous Q6H    dextromethorphan-guaiFENesin  10 mL Oral 4 times per day    warfarin (COUMADIN) daily dosing (placeholder)   Other RX Placeholder    multivitamin  1 tablet Oral Daily    midodrine  5 mg Oral TID WC    sodium chloride flush  10 mL Intravenous 2 times per day    ipratropium-albuterol  1 ampule Inhalation Q4H WA    digoxin  125 mcg Oral Daily    dilTIAZem  120 mg Oral Daily    finasteride  5 mg Oral Daily    levothyroxine  75 mcg Oral Daily    tamsulosin  0.8 mg Oral Nightly       Social History:     Social History     Socioeconomic History    Marital status:      Spouse name: Not on file    Number of children: Not on file    Years of education: Not on file    Highest education level: Not on file   Occupational History    Not on file   Social Needs    Financial resource strain: Not on file    Food insecurity     Worry: Not on file     Inability: Not on file    Transportation needs     Medical: Not on file     Non-medical: Not on file   Tobacco Use    Smoking status: Never Smoker    Smokeless tobacco: Never Used   Substance and Sexual Activity    Alcohol use: Never     Frequency: Never     Binge frequency: Never    Drug use: Never    Sexual activity: Not on file   Lifestyle    Physical activity     Days per week: Not on file     Minutes per session: Not on file    Stress: Not on file   Relationships    Social connections     Talks on phone: Not on file     Gets together: Not on file     Attends Orthodoxy service: Not on file Active member of club or organization: Not on file     Attends meetings of clubs or organizations: Not on file     Relationship status: Not on file    Intimate partner violence     Fear of current or ex partner: Not on file     Emotionally abused: Not on file     Physically abused: Not on file     Forced sexual activity: Not on file   Other Topics Concern    Not on file   Social History Narrative    Not on file       Family History:   History reviewed. No pertinent family history. Allergies:   Patient has no known allergies. Review of Systems:   Unable to obtain much history from patient. Physical Examination :     Patient Vitals for the past 8 hrs:   BP Temp Temp src Pulse Resp SpO2 Weight   12/31/20 0812      96 %    12/31/20 0810      97 %    12/31/20 0705 (!) 103/52 97.8 °F (36.6 °C) Oral 66 16 100 %    12/31/20 0628       181 lb (82.1 kg)   12/31/20 0448 (!) 102/58 97.1 °F (36.2 °C) Axillary 81 22 93 %      General Appearance: Awake, alert, oriented to self only,agitated and ill appearing. Head:  Normocephalic, no trauma  Eyes: Pupils equal, round, reactive to light and accommodation; extraocular movements intact; sclera anicteric; conjunctivae pink. No embolic phenomena. ENT: Oropharynx clear, without erythema, exudate, or thrush. No tenderness of sinuses. Mouth/throat: mucosa pink and moist. No lesions. Dentition in good repair. Neck:Supple, without lymphadenopathy. Thyroid normal, No bruits. Pulmonary/Chest: Clear to auscultation, has diffuse crackles on auscultation (improved compared to yesterday). No dullness to percussion. Cardiovascular: irregular rhythm without murmurs, rubs, or gallops. Abdomen: Soft, non tender. Bowel sounds normal. No organomegaly  All four Extremities: No cyanosis, clubbing, edema, or effusions. Neurologic: No gross sensory or motor deficits. Skin: Warm and dry with good turgor. No signs of peripheral arterial or venous insufficiency. No ulcerations. No open wounds. Medical Decision Making -Laboratory:   I have independently reviewed/ordered the following labs:    CBC with Differential:   Recent Labs     20  0531 20  0706   WBC 12.0* 9.2   HGB 9.6* 9.5*   HCT 31.4* 31.6*    257   LYMPHOPCT 11* 14*   MONOPCT 3 4     BMP:   Recent Labs     20  0531 20  0706    140   K 3.5* 3.4*   CL 98 99   CO2 26 28   BUN 17 16   CREATININE 0.83 0.74   MG 2.3 2.6     Hepatic Function Panel:   Recent Labs     20  0531 20  0706   LABALBU 2.0* 2.1*     No results for input(s): RPR in the last 72 hours. No results for input(s): HIV in the last 72 hours. No results for input(s): BC in the last 72 hours. Lab Results   Component Value Date    MUCUS NOT REPORTED 2020    RBC 3.33 2020    TRICHOMONAS NOT REPORTED 2020    WBC 9.2 2020    YEAST NOT REPORTED 2020    TURBIDITY CLEAR 2020     Lab Results   Component Value Date    CREATININE 0.74 2020    GLUCOSE 111 2020       Medical Decision Making-Imagin/26/20    Medical Decision Gvxoce-Wzogcayo-Sbvkd:       Medical Decision Making-Other:     Note:  · Labs, medications, radiologic studies were reviewed with personal review of films  · Large amounts of data were reviewed  · Discussed with nursing Staff, Discharge planner  · Infection Control and Prevention measures reviewed  · All prior entries were reviewed  · Administer medications as ordered  · Prognosis: Guarded  · Discharge planning reviewed  · Follow up as outpatient. Thank you for allowing us to participate in the care of this patient. Please call with questions. Nellie Carpenter, MS4 participated in the evaluation of this patient under my direct supervision.       Nellie Carpenter    ATTESTATION:

## 2020-12-31 NOTE — CARE COORDINATION
Transitional planning:   Wilmer Abarca from [place] called. States pt's insurance terminates today. [place] starts tomorrow and needs precert. [place] opens Monday, so no precert can be started until then.

## 2020-12-31 NOTE — PLAN OF CARE
Nutrition Problem #1: Moderate malnutrition, In context of acute illness or injury  Intervention: Food and/or Nutrient Delivery: Continue Current Diet, Start Oral Nutrition Supplement  Nutritional Goals: Pt to meet % of est'd needs via PO

## 2020-12-31 NOTE — PROGRESS NOTES
ampule Inhalation Q4H WA    digoxin  125 mcg Oral Daily    dilTIAZem  120 mg Oral Daily    finasteride  5 mg Oral Daily    levothyroxine  75 mcg Oral Daily    tamsulosin  0.8 mg Oral Nightly     Continuous Infusions:   PRN Meds: potassium chloride **OR** potassium alternative oral replacement **OR** potassium chloride, nicotine, promethazine **OR** ondansetron, magnesium hydroxide, acetaminophen **OR** acetaminophen, albuterol    Data:     Past Medical History:   has a past medical history of Atherosclerosis, Benign prostatic hyperplasia, Hypertension, Hypothyroidism, Osteoporosis, Paroxysmal A-fib ([address] Pneumonia, Rhabdomyolysis, Smoking, Spondylolisthesis, and Vitamin D deficiency. Social History:   reports that he has never smoked. He has never used smokeless tobacco. He reports that he does not drink alcohol or use drugs. Family History: History reviewed. No pertinent family history. Vitals:  BP (!) 103/52   Pulse 66   Temp 97.8 °F (36.6 °C) (Oral)   Resp 16   Ht 6' 3\" (1.905 m)   Wt 181 lb (82.1 kg)   SpO2 96%   BMI 22.62 kg/m²   Temp (24hrs), Av.6 °F (36.4 °C), Min:97.1 °F (36.2 °C), Max:97.9 °F (36.6 °C)    Recent Labs     20  0819 20  1253 20  1727   POCGLU 127* 98 126*       I/O (24Hr):     Intake/Output Summary (Last 24 hours) at 2020 0900  Last data filed at 2020 0448  Gross per 24 hour   Intake 360 ml   Output 125 ml   Net 235 ml       Labs:  Hematology:  Recent Labs     20  0357 20  0531 20  0706   WBC 14.3* 12.0* 9.2   RBC 3.31* 3.31* 3.33*   HGB 9.5* 9.6* 9.5*   HCT 32.1* 31.4* 31.6*   MCV 97.0 94.9 94.9   MCH 28.7 29.0 28.5   MCHC 29.6 30.6 30.1   RDW 19.8* 19.5* 19.2*    286 257   MPV 10.9 10.6 10.1   INR 1.1 1.2 1.3     Chemistry:  Recent Labs     20  0357 20  0357 20  0531 20  0531 20  1659 20  2248 20  0706   *  --  139  --   --   --  140   K 3.8  --  3.5*  --   --   -- 3.4*   CL 95*  --  98  --   --   --  99   CO2 29  --  26  --   --   --  28   GLUCOSE 103*  --  96  --   --   --  111*   BUN 18  --  17  --   --   --  16   CREATININE 0.72  --  0.83  --   --   --  0.74   MG 2.1  --  2.3  --   --   --  2.6   ANIONGAP 10  --  15  --   --   --  13   LABGLOM >60  --  >60  --   --   --  >60   GFRAA >60  --  >60  --   --   --  >60   CALCIUM 8.8  --  8.8  --   --   --  9.0   PHOS 2.5  --  2.8  --   --   --  2.8   PROBNP  --   --  615*  --   --   --   --    TROPHS 38*   < > 45*   < > 41* 38* 39*    < > = values in this interval not displayed. Recent Labs     12/29/20  0357 12/29/20  0819 12/29/20  1253 12/29/20  1727 12/30/20  0531 12/31/20  0706   LABALBU 2.2*  --   --   --  2.0* 2.1*   POCGLU  --  127* 98 126*  --   --      ABG:No results found for: POCPH, PHART, PH, POCPCO2, GXP1NMH, PCO2, POCPO2, PO2ART, PO2, POCHCO3, JGX8AQO, HCO3, NBEA, PBEA, BEART, BE, THGBART, THB, OMB0VRD, JNKL2KJS, H9RTOIAS, O2SAT, FIO2  Lab Results   Component Value Date/Time    SPECIAL NOT REPORTED 12/26/2020 05:12 PM     Lab Results   Component Value Date/Time    CULTURE PRESUMPTIVE CANDIDA ALBICANS 10 to 50,000 CFU/ML (A) 12/26/2020 05:12 PM       Radiology:  Xr Chest (single View Frontal)    Result Date: 12/30/2020  Persistent left lower lobe opacification concerning for pneumonia. Interval improvement of right basilar infiltrate. Left perihilar atelectasis. Xr Chest (2 Vw)    Result Date: 12/26/2020  Similar findings to those seen on recent CT, compatible with pneumonia and some basilar atelectasis/consolidation. Xr Chest Portable    Result Date: 12/27/2020  Persistent findings of aspiration or pneumonia near the bases, appearing worsened in the right base and decreased in the lingula. Xr Chest Portable    Result Date: 12/25/2020  Subtle patchy bilateral lung opacities increased from the prior study are likely on the basis of pneumonia.      Ct Chest Pulmonary Embolism W Contrast    Result Date: 12/25/2020  No central or segmental pulmonary embolus. Bilateral lower lobe consolidation and scattered bilateral ground-glass nodular opacities are likely on the basis of pneumonia. Recommend follow-up. Physical Examination:        General appearance: Chronically ill-appearing male resting comfortably in bed in no acute distress  Mental Status: comfortable oriented x2  Lungs:  Course to auscultation the lower lobes bilaterally with diminished breath sounds at the bases    Heart:  regular rate and rhythm, no murmur  Abdomen:  soft, nontender, nondistended, normal bowel sounds, no masses, hepatomegaly, splenomegaly +sacral ulcer  Extremities:  no edema, redness, tenderness in the calves  Skin:  no gross lesions, rashes, induration    Assessment:        Hospital Problems           Last Modified POA    * (Principal) Community acquired bacterial pneumonia 12/26/2020 Yes    Hypertension 12/25/2020 Yes    Hypothyroidism 12/25/2020 Yes    Benign prostatic hyperplasia 12/25/2020 Yes    Paroxysmal atrial fibrillation ([address] 12/25/2020 Yes    Chronic anticoagulation 12/25/2020 Yes    Lactic acidosis 12/26/2020 Yes    Sepsis ([address] 12/26/2020 Yes    Acute respiratory failure with hypoxia ([address] 12/27/2020 Yes          Plan:        1. Acute hypoxic respiratory failure   - 2/2 pneumonia, atelectasis,volume overload and aspiration   - Cultures remain negative.   - continue to wean high flow oxygen via nasal cannula   - FiO2 down to 50%  - antibiotic regimen changed to unasyn 3g IV Q6Hr (day 2). Continue until 1/5/21 per ID recommendations   - ID and pulmonary on board   - Speech consulted, aspiration precautions.  Recommend dental soft diet with nectar   - Continue incentive spirometry, Duo neb's.   - chest xray done on 12/27 showed persistent findings of aspiration or pneumonia near the bases appearing worsened in the right base   - chest xray done 12/30 showed persistent LLL opacification concerning for pneumonia with interval improvement of right basilar infiltrate   - legionella negative  - strep pneumo negative   - COVID negative      2. Acute Hypokalemia:   - replace     3. Abnormal UA:   - Candiduria likely colonization.      4. Paroxysmal atrial fibrillation:   - Continue Cardizem and Coumadin   - telemetry   - INR currently 1.3. Lovenox for bridging      5. Hypothyroidism:   - Continue Synthroid 75 mcg daily     6. BPH:   - Continue Flomax   - Bladder scan q shift, straight cath for urinary retention greater than 250 cc     7. NCNC anemia:  - stable   - currently improved at 9.5  - continue to monitor      8.  DISPO:   - Pending clinical improvement   - PT/OT consulted     Derik Melendrez MD  12/31/2020  9:00 AM

## 2020-12-31 NOTE — PROGRESS NOTES
Comprehensive Nutrition Assessment    Type and Reason for Visit:  Initial(LOS Day 6)    Nutrition Recommendations/Plan:   -Continue dental soft diet w/ NTL   -Recommend magic cup supplements BID   -Will monitor po intake, weights and wound healing progression     Nutrition Assessment:   Pt admitted d/t respiratory distress 2/2 Pneumonia. Pt is a poor historian w/ dementia. Writer obtained information from pt's aide. Aide reported that pt has had a good appetite this week consuming almost 100% of his meals. Pt ate all of his b-fast this morning except for a banana. Pt w/ 3.2% wt loss x 2.5 wks, significant. Pt appeared thin and meets the criteria for moderate malnutrition. Pt passed MBSS w/ dental soft and NTL. Will add nutritional supplements to compliment po intake and to aide in wound healing progression. Goal of wt stabilization/gain as medially able. Will continue to monitor. Malnutrition Assessment:  Malnutrition Status:   Moderate malnutrition    Context:  Acute Illness     Findings of the 6 clinical characteristics of malnutrition:  Energy Intake:  No significant decrease in energy intake  Weight Loss:  7 - Greater than 2% over 1 week     Body Fat Loss:  1 - Mild body fat loss Orbital, Triceps, Buccal region   Muscle Mass Loss:  1 - Mild muscle mass loss Temples (temporalis), Clavicles (pectoralis & deltoids), Scapula (trapezius)  Fluid Accumulation:  1 - Mild Extremities, Generalized   Strength:  Not Performed    Estimated Daily Nutrient Needs:  Energy (kcal):  30-32 ~> 5127-7919 kcals/d; Weight Used for Energy Requirements:  Admission     Protein (g):  1.5-1.7 gm/kg ~>125-140 gms/d; Weight Used for Protein Requirements:  Admission          Nutrition Related Findings:  K 3.4      Wounds:  Multiple, Skin Tears, Open Wounds, Pressure Injury, Stage II       Current Nutrition Therapies:    DIET DENTAL SOFT; Mildly Thick (Nectar)    Anthropometric Measures:  · Height: 6' 3\" (190.5 cm)  · Current Body Weight: 181 lb (82.1 kg)   · Admission Body Weight: 183 lb (83 kg)    · Ideal Body Weight: 196 lbs; % Ideal Body Weight 92.3 %   · BMI: 22.6  · BMI Categories: Normal Weight (BMI 22.0 to 24.9) age over 72       Nutrition Diagnosis:   · Moderate malnutrition, In context of acute illness or injury related to swallowing difficulty, cognitive or neurological impairment(advanced age) as evidenced by intake 51-75%, localized or generalized fluid accumulation, mild loss of subcutaneous fat, mild muscle loss(Need for ONS)      Nutrition Interventions:   Food and/or Nutrient Delivery:  Continue Current Diet, Start Oral Nutrition Supplement  Nutrition Education/Counseling:  Education not indicated   Coordination of Nutrition Care:  Continue to monitor while inpatient, Speech Therapy    Goals: Set   Pt to meet % of est'd needs via PO       Nutrition Monitoring and Evaluation:   Food/Nutrient Intake Outcomes:  Food and Nutrient Intake, Supplement Intake, Diet Advancement/Tolerance  Physical Signs/Symptoms Outcomes:  Biochemical Data, Nutrition Focused Physical Findings, Skin, Weight, Chewing or Swallowing, GI Status, Fluid Status or Edema     Discharge Planning:     Too soon to determine     Electronically signed by Juve Bear RD, LD on 12/31/20 at 1:03 PM EST    Contact: [phone]

## 2020-12-31 NOTE — PROGRESS NOTES
[facility] - Progress Note    Today's Date and Time: 12/31/2020, 12:20 PM    Impression :   · AFib (chronically anticoagulated with warfarin)  · HTN  · BPH  · Hypothyroidism  · Dementia   · Pressure wound on coccyx. · Worsening pneumonia  · Aspiration pneumonia  · Single blood culture with Coagulase negative Staphylococci on 12/15/2020  · Covid tests:  · 12/14/2020 -negative  · 12/25/2020 negative    Recommendations:   · Unasyn 3 g IV q 6hr for aspiration. Stop date 1-5-21  · Diuresis as tolerated  · Monitor clinical response  · Wound care    Medical Decision Making/Summary/Discussion:12/31/2020     ·   Infection Control Recommendations   · [place] Precautions  · Contact Isolation MRSA    Antimicrobial Stewardship Recommendations     · Simplification of therapy    Coordination of Outpatient Care:   · Estimated Length of IV antimicrobials:TBD  · Patient will need Midline Catheter Insertion: No  · Patient will need PICC line Insertion:No  · Patient will need: Home IV , [place],  SNF,  LTAC:TBD  · Patient will need outpatient wound care:Yes    Chief complaint/reason for consultation:   · Pneumonia  · Sacral decubiti    History of Present Illness:   Lawyer Hubbard is a 82y.o.-year-old white male who was initially admitted on 12/25/2020. Patient seen at the request of Dr. Ruma Davis DO.    INITIAL HISTORY:    This is a patient with AFib (chronically anticoagulated with warfarin), HTN, BPH, hypothyroidism, dementia who presents from [facility] ER with shortness of breath. Patient is a poor historian, thus most information is collected from EMR. EMS was called from patient's nursing facility because of increased shortness of breath. Patient uses 4 L oxygen at home,  he was saturating  85% on 4L. Patient was recently discharged from hospital on 12/18/2020 after treatment for pneumonia and supra therapeutic INR. Patient was discharged on PO doxycycline.   CXR done at [facility] read as worsening pneumonia. WBC elevated at 21.6>20.6, lactic acid 3.5,Trop: 32>30>29, pro-bnp: 488 D-Dimer: 1.04. CT Chest negative for PE and COVID-19 swab negative. Patient was given 1 dose of IV Zosyn and Cipro in the emergency room   Patient was transferred to [facility] [place]. Vs for continued management of Pneumonia.      CURRENT EVALUATION 12/31/20    Afebrile  VS stable    The patient seen and evaluated at bedside. Patient is better with no new acute issues or concerns today. Patient does not have any fevers, chills, chest pains or palpitations. Patient remains awake alert  Reports improvement in his breathing and cough  Overall indicates that he feels better    On chest examination he has some residual diffuse crackles at the bases       CXR 12/17/20  Subtle patchy bilateral lung opacities increased from the prior study are   likely on the basis of pneumonia     CXR 12/26/20  compatible with pneumonia and   some basilar atelectasis/consolidation. CT chest 12/25/20  Bilateral lower lobe consolidation and scattered bilateral ground-glass   nodular opacities are likely on the basis of pneumonia       Labs, X rays reviewed: 12/31/2020    BUN:25>17>18-->11  Cr:1.16>0.85>0.72-->0.83    WBC:21.6>20.6>16.8->14.3-->12.0-->9.2  Hb:10.8>9>8.5>9.5-->9.6-->9.5  Plat: 339>250>227>320-->286-->257    Cultures:  Urine:  · Strept pneumonia antigen negative   · Legionella antigen negative    Blood: 12/25/20- no growth till date. ·   Sputum :  ·   Wound:  ·     Discussed with patient, RN        I have personally reviewed the past medical history, past surgical history, medications, social history, and family history, and I have updated the database accordingly.   Past Medical History:     Past Medical History:   Diagnosis Date    Atherosclerosis     Benign prostatic hyperplasia     Hypertension     Hypothyroidism     Osteoporosis     Paroxysmal A-fib ([address]     Pneumonia     Rhabdomyolysis     Smoking     Spondylolisthesis     Vitamin D deficiency        Past Surgical  History:     Past Surgical History:   Procedure Laterality Date    ANKLE SURGERY      HIP SURGERY      TONSILLECTOMY AND ADENOIDECTOMY         Medications:      warfarin  5 mg Oral Once    enoxaparin  1 mg/kg Subcutaneous BID    ampicillin-sulbactam  3 g Intravenous Q6H    dextromethorphan-guaiFENesin  10 mL Oral 4 times per day    warfarin (COUMADIN) daily dosing (placeholder)   Other RX Placeholder    multivitamin  1 tablet Oral Daily    midodrine  5 mg Oral TID WC    sodium chloride flush  10 mL Intravenous 2 times per day    ipratropium-albuterol  1 ampule Inhalation Q4H WA    digoxin  125 mcg Oral Daily    dilTIAZem  120 mg Oral Daily    finasteride  5 mg Oral Daily    levothyroxine  75 mcg Oral Daily    tamsulosin  0.8 mg Oral Nightly       Social History:     Social History     Socioeconomic History    Marital status:      Spouse name: Not on file    Number of children: Not on file    Years of education: Not on file    Highest education level: Not on file   Occupational History    Not on file   Social Needs    Financial resource strain: Not on file    Food insecurity     Worry: Not on file     Inability: Not on file    Transportation needs     Medical: Not on file     Non-medical: Not on file   Tobacco Use    Smoking status: Never Smoker    Smokeless tobacco: Never Used   Substance and Sexual Activity    Alcohol use: Never     Frequency: Never     Binge frequency: Never    Drug use: Never    Sexual activity: Not on file   Lifestyle    Physical activity     Days per week: Not on file     Minutes per session: Not on file    Stress: Not on file   Relationships    Social connections     Talks on phone: Not on file     Gets together: Not on file     Attends [employer] service: Not on file     Active member of club or organization: Not on file     Attends meetings of clubs or arterial or venous insufficiency. No ulcerations. No open wounds. Medical Decision Making -Laboratory:   I have independently reviewed/ordered the following labs:    CBC with Differential:   Recent Labs     20  0531 20  0706   WBC 12.0* 9.2   HGB 9.6* 9.5*   HCT 31.4* 31.6*    257   LYMPHOPCT 11* 14*   MONOPCT 3 4     BMP:   Recent Labs     20  0531 20  0706    140   K 3.5* 3.4*   CL 98 99   CO2 26 28   BUN 17 16   CREATININE 0.83 0.74   MG 2.3 2.6     Hepatic Function Panel:   Recent Labs     20  0531 20  0706   LABALBU 2.0* 2.1*     No results for input(s): RPR in the last 72 hours. No results for input(s): HIV in the last 72 hours. No results for input(s): BC in the last 72 hours. Lab Results   Component Value Date    MUCUS NOT REPORTED 2020    RBC 3.33 2020    TRICHOMONAS NOT REPORTED 2020    WBC 9.2 2020    YEAST NOT REPORTED 2020    TURBIDITY CLEAR 2020     Lab Results   Component Value Date    CREATININE 0.74 2020    GLUCOSE 111 2020       Medical Decision Making-Imagin/26/20    Medical Decision [facility]ww-[facility]-Zcsks:       Medical Decision Making-Other:     Note:  · Labs, medications, radiologic studies were reviewed with personal review of films  · Large amounts of data were reviewed  · Discussed with nursing Staff, Discharge planner  · Infection Control and Prevention measures reviewed  · All prior entries were reviewed  · Administer medications as ordered  · Prognosis: Guarded  · Discharge planning reviewed  · Follow up as outpatient. Thank you for allowing us to participate in the care of this patient. Please call with questions. Indra Chapman MS4 participated in the evaluation of this patient under my direct supervision.       Darin Nicolas MD

## 2020-12-31 NOTE — PROGRESS NOTES
Pharmacy Note  Warfarin Consult follow-up    Recent Labs     12/31/20  0706   INR 1.3     Recent Labs     12/29/20  0357 12/30/20  0531 12/31/20  0706   HGB 9.5* 9.6* 9.5*   HCT 32.1* 31.4* 31.6*    286 257     Current warfarin drug-drug interactions: acetaminophen, Unasyn, enoxaparin, levothyroxine    Date INR Dose   12/25 2.1 None   12/26 2.1 2 mg    12/27 1.4 5 mg   12/28/2020 1.2 3mg   12/29/2020 1.1 3mg   12/30/2020 1.2 3 mg    12/31/2020 1.3 5mg     Notes:   Give warfarin 5mg today on 12/31/2020. Daily PT/INR while inpatient.      Donnie Villalba RPh, NISHA  Clinical Pharmacist Medication Management  12/31/2020  10:24 AM

## 2020-12-31 NOTE — PLAN OF CARE
Problem: Respiratory  Intervention: Respiratory assessment  Note:   PROVIDE ADEQUATE OXYGENATION WITH ACCEPTABLE SP02/ABG'S    [x]  IDENTIFY APPROPRIATE OXYGEN THERAPY  [x]   MONITOR SP02/ABG'S AS NEEDED   [x]   PATIENT EDUCATION AS NEEDED

## 2021-01-01 ENCOUNTER — HOSPITAL ENCOUNTER (OUTPATIENT)
Age: 86
Setting detail: SPECIMEN
Discharge: HOME | End: 2021-01-19
Payer: MEDICARE

## 2021-01-01 ENCOUNTER — HOSPITAL ENCOUNTER (OUTPATIENT)
Age: 86
Setting detail: SPECIMEN
Discharge: HOME | End: 2021-01-15
Payer: COMMERCIAL

## 2021-01-01 ENCOUNTER — TELEPHONE (OUTPATIENT)
Dept: PULMONOLOGY | Age: 86
End: 2021-01-01

## 2021-01-01 ENCOUNTER — APPOINTMENT (OUTPATIENT)
Dept: GENERAL RADIOLOGY | Age: 86
DRG: 871 | End: 2021-01-01
Payer: MEDICARE

## 2021-01-01 ENCOUNTER — HOSPITAL ENCOUNTER (OUTPATIENT)
Age: 86
Setting detail: SPECIMEN
Discharge: HOME | End: 2021-01-12
Payer: MEDICARE

## 2021-01-01 ENCOUNTER — HOSPITAL ENCOUNTER (OUTPATIENT)
Age: 86
Setting detail: SPECIMEN
Discharge: HOME | End: 2021-01-29
Payer: COMMERCIAL

## 2021-01-01 ENCOUNTER — HOSPITAL ENCOUNTER (OUTPATIENT)
Age: 86
Setting detail: SPECIMEN
Discharge: HOME | End: 2021-01-26

## 2021-01-01 ENCOUNTER — HOSPITAL ENCOUNTER (OUTPATIENT)
Age: 86
Setting detail: SPECIMEN
Discharge: HOME | End: 2021-02-01
Payer: COMMERCIAL

## 2021-01-01 ENCOUNTER — HOSPITAL ENCOUNTER (OUTPATIENT)
Age: 86
Setting detail: SPECIMEN
Discharge: HOME | End: 2021-02-02
Payer: COMMERCIAL

## 2021-01-01 ENCOUNTER — HOSPITAL ENCOUNTER (OUTPATIENT)
Age: 86
Setting detail: SPECIMEN
Discharge: HOME | End: 2021-01-22
Payer: MEDICARE

## 2021-01-01 VITALS
WEIGHT: 183.8 LBS | RESPIRATION RATE: 16 BRPM | HEIGHT: 75 IN | OXYGEN SATURATION: 94 % | BODY MASS INDEX: 22.85 KG/M2 | SYSTOLIC BLOOD PRESSURE: 102 MMHG | DIASTOLIC BLOOD PRESSURE: 58 MMHG | HEART RATE: 90 BPM | TEMPERATURE: 98.1 F

## 2021-01-01 LAB
ABSOLUTE EOS #: 0.23 K/UL (ref 0–0.44)
ABSOLUTE EOS #: 0.27 K/UL (ref 0–0.44)
ABSOLUTE EOS #: 0.28 K/UL (ref 0–0.44)
ABSOLUTE EOS #: 0.31 K/UL (ref 0–0.44)
ABSOLUTE EOS #: 0.33 K/UL (ref 0–0.44)
ABSOLUTE EOS #: 0.35 K/UL (ref 0–0.44)
ABSOLUTE EOS #: 0.37 K/UL (ref 0–0.44)
ABSOLUTE EOS #: 0.38 K/UL (ref 0–0.44)
ABSOLUTE EOS #: 0.49 K/UL (ref 0–0.4)
ABSOLUTE IMMATURE GRANULOCYTE: 0.08 K/UL (ref 0–0.3)
ABSOLUTE IMMATURE GRANULOCYTE: 0.26 K/UL (ref 0–0.3)
ABSOLUTE IMMATURE GRANULOCYTE: 0.28 K/UL (ref 0–0.3)
ABSOLUTE IMMATURE GRANULOCYTE: 0.3 K/UL (ref 0–0.3)
ABSOLUTE IMMATURE GRANULOCYTE: 0.32 K/UL (ref 0–0.3)
ABSOLUTE IMMATURE GRANULOCYTE: 0.33 K/UL (ref 0–0.3)
ABSOLUTE IMMATURE GRANULOCYTE: 0.33 K/UL (ref 0–0.3)
ABSOLUTE IMMATURE GRANULOCYTE: 0.35 K/UL (ref 0–0.3)
ABSOLUTE IMMATURE GRANULOCYTE: 0.39 K/UL (ref 0–0.3)
ABSOLUTE LYMPH #: 1.31 K/UL (ref 1–4.8)
ABSOLUTE LYMPH #: 1.75 K/UL (ref 1.1–3.7)
ABSOLUTE LYMPH #: 1.79 K/UL (ref 1.1–3.7)
ABSOLUTE LYMPH #: 1.79 K/UL (ref 1.1–3.7)
ABSOLUTE LYMPH #: 1.81 K/UL (ref 1.1–3.7)
ABSOLUTE LYMPH #: 1.86 K/UL (ref 1.1–3.7)
ABSOLUTE LYMPH #: 1.98 K/UL (ref 1.1–3.7)
ABSOLUTE LYMPH #: 2.12 K/UL (ref 1.1–3.7)
ABSOLUTE LYMPH #: 2.29 K/UL (ref 1.1–3.7)
ABSOLUTE MONO #: 0.25 K/UL (ref 0.1–0.8)
ABSOLUTE MONO #: 0.27 K/UL (ref 0.1–1.2)
ABSOLUTE MONO #: 0.29 K/UL (ref 0.1–1.2)
ABSOLUTE MONO #: 0.32 K/UL (ref 0.1–1.2)
ABSOLUTE MONO #: 0.33 K/UL (ref 0.1–1.2)
ABSOLUTE MONO #: 0.34 K/UL (ref 0.1–1.2)
ABSOLUTE MONO #: 0.37 K/UL (ref 0.1–1.2)
ABSOLUTE MONO #: 0.44 K/UL (ref 0.1–1.2)
ABSOLUTE MONO #: 0.47 K/UL (ref 0.1–1.2)
ALBUMIN SERPL-MCNC: 2.1 G/DL (ref 3.5–5.2)
ALBUMIN SERPL-MCNC: 2.2 G/DL (ref 3.5–5.2)
ALBUMIN SERPL-MCNC: 2.3 G/DL (ref 3.5–5.2)
ALBUMIN SERPL-MCNC: 2.4 G/DL (ref 3.5–5.2)
ALBUMIN SERPL-MCNC: 2.4 G/DL (ref 3.5–5.2)
ALBUMIN SERPL-MCNC: 2.6 G/DL (ref 3.5–5.2)
ALBUMIN SERPL-MCNC: 2.8 G/DL (ref 3.5–5.2)
ALBUMIN SERPL-MCNC: 2.9 G/DL (ref 3.5–5.2)
ALBUMIN SERPL-MCNC: 2.9 G/DL (ref 3.5–5.2)
ANION GAP SERPL CALCULATED.3IONS-SCNC: 10 MMOL/L (ref 9–17)
ANION GAP SERPL CALCULATED.3IONS-SCNC: 10 MMOL/L (ref 9–17)
ANION GAP SERPL CALCULATED.3IONS-SCNC: 11 MMOL/L (ref 9–17)
ANION GAP SERPL CALCULATED.3IONS-SCNC: 12 MMOL/L (ref 9–17)
ANION GAP SERPL CALCULATED.3IONS-SCNC: 12 MMOL/L (ref 9–17)
ANION GAP SERPL CALCULATED.3IONS-SCNC: 15 MMOL/L (ref 9–17)
ANION GAP SERPL CALCULATED.3IONS-SCNC: 18 MMOL/L (ref 9–17)
ANION GAP SERPL CALCULATED.3IONS-SCNC: 7 MMOL/L (ref 9–17)
ANION GAP SERPL CALCULATED.3IONS-SCNC: 8 MMOL/L (ref 9–17)
ANION GAP SERPL CALCULATED.3IONS-SCNC: 8 MMOL/L (ref 9–17)
ANION GAP SERPL CALCULATED.3IONS-SCNC: 9 MMOL/L (ref 9–17)
ANION GAP SERPL CALCULATED.3IONS-SCNC: 9 MMOL/L (ref 9–17)
BASOPHILS # BLD: 1 % (ref 0–2)
BASOPHILS ABSOLUTE: 0.04 K/UL (ref 0–0.2)
BASOPHILS ABSOLUTE: 0.05 K/UL (ref 0–0.2)
BASOPHILS ABSOLUTE: 0.06 K/UL (ref 0–0.2)
BASOPHILS ABSOLUTE: 0.08 K/UL (ref 0–0.2)
BASOPHILS ABSOLUTE: 0.08 K/UL (ref 0–0.2)
BASOPHILS ABSOLUTE: 0.09 K/UL (ref 0–0.2)
BASOPHILS ABSOLUTE: 0.09 K/UL (ref 0–0.2)
BASOPHILS ABSOLUTE: 0.1 K/UL (ref 0–0.2)
BASOPHILS ABSOLUTE: 0.11 K/UL (ref 0–0.2)
BUN BLDV-MCNC: 12 MG/DL (ref 8–23)
BUN BLDV-MCNC: 12 MG/DL (ref 8–23)
BUN BLDV-MCNC: 13 MG/DL (ref 8–23)
BUN BLDV-MCNC: 13 MG/DL (ref 8–23)
BUN BLDV-MCNC: 16 MG/DL (ref 8–23)
BUN BLDV-MCNC: 16 MG/DL (ref 8–23)
BUN BLDV-MCNC: 18 MG/DL (ref 8–23)
BUN BLDV-MCNC: 18 MG/DL (ref 8–23)
BUN BLDV-MCNC: 19 MG/DL (ref 8–23)
BUN BLDV-MCNC: 19 MG/DL (ref 8–23)
BUN BLDV-MCNC: 23 MG/DL (ref 8–23)
BUN BLDV-MCNC: 26 MG/DL (ref 8–23)
BUN/CREAT BLD: ABNORMAL (ref 9–20)
CALCIUM SERPL-MCNC: 10.1 MG/DL (ref 8.6–10.4)
CALCIUM SERPL-MCNC: 8.4 MG/DL (ref 8.6–10.4)
CALCIUM SERPL-MCNC: 8.6 MG/DL (ref 8.6–10.4)
CALCIUM SERPL-MCNC: 8.7 MG/DL (ref 8.6–10.4)
CALCIUM SERPL-MCNC: 8.8 MG/DL (ref 8.6–10.4)
CALCIUM SERPL-MCNC: 8.9 MG/DL (ref 8.6–10.4)
CALCIUM SERPL-MCNC: 9.2 MG/DL (ref 8.6–10.4)
CALCIUM SERPL-MCNC: 9.5 MG/DL (ref 8.6–10.4)
CALCIUM SERPL-MCNC: 9.7 MG/DL (ref 8.6–10.4)
CHLORIDE BLD-SCNC: 100 MMOL/L (ref 98–107)
CHLORIDE BLD-SCNC: 101 MMOL/L (ref 98–107)
CHLORIDE BLD-SCNC: 102 MMOL/L (ref 98–107)
CHLORIDE BLD-SCNC: 102 MMOL/L (ref 98–107)
CHLORIDE BLD-SCNC: 103 MMOL/L (ref 98–107)
CHLORIDE BLD-SCNC: 96 MMOL/L (ref 98–107)
CHLORIDE BLD-SCNC: 99 MMOL/L (ref 98–107)
CO2: 23 MMOL/L (ref 20–31)
CO2: 26 MMOL/L (ref 20–31)
CO2: 26 MMOL/L (ref 20–31)
CO2: 27 MMOL/L (ref 20–31)
CO2: 28 MMOL/L (ref 20–31)
CO2: 28 MMOL/L (ref 20–31)
CO2: 30 MMOL/L (ref 20–31)
CO2: 35 MMOL/L (ref 20–31)
CREAT SERPL-MCNC: 0.67 MG/DL (ref 0.7–1.2)
CREAT SERPL-MCNC: 0.7 MG/DL (ref 0.7–1.2)
CREAT SERPL-MCNC: 0.72 MG/DL (ref 0.7–1.2)
CREAT SERPL-MCNC: 0.73 MG/DL (ref 0.7–1.2)
CREAT SERPL-MCNC: 0.76 MG/DL (ref 0.7–1.2)
CREAT SERPL-MCNC: 0.78 MG/DL (ref 0.7–1.2)
CREAT SERPL-MCNC: 0.79 MG/DL (ref 0.7–1.2)
CREAT SERPL-MCNC: 0.79 MG/DL (ref 0.7–1.2)
CREAT SERPL-MCNC: 0.82 MG/DL (ref 0.7–1.2)
CREAT SERPL-MCNC: 0.87 MG/DL (ref 0.7–1.2)
CREAT SERPL-MCNC: 0.92 MG/DL (ref 0.7–1.2)
CREAT SERPL-MCNC: 1.35 MG/DL (ref 0.7–1.2)
DIFFERENTIAL TYPE: ABNORMAL
DIGOXIN DATE LAST DOSE: NORMAL
DIGOXIN DATE LAST DOSE: NORMAL
DIGOXIN DOSE AMOUNT: NORMAL
DIGOXIN DOSE AMOUNT: NORMAL
DIGOXIN DOSE TIME: NORMAL
DIGOXIN DOSE TIME: NORMAL
DIGOXIN LEVEL: 0.9 NG/ML (ref 0.5–2)
DIGOXIN LEVEL: 0.9 NG/ML (ref 0.5–2)
EKG ATRIAL RATE: 78 BPM
EKG P AXIS: 0 DEGREES
EKG P-R INTERVAL: 206 MS
EKG Q-T INTERVAL: 392 MS
EKG QRS DURATION: 92 MS
EKG QTC CALCULATION (BAZETT): 446 MS
EKG R AXIS: -12 DEGREES
EKG T AXIS: -34 DEGREES
EKG VENTRICULAR RATE: 78 BPM
EOSINOPHILS RELATIVE PERCENT: 3 % (ref 1–4)
EOSINOPHILS RELATIVE PERCENT: 4 % (ref 1–4)
EOSINOPHILS RELATIVE PERCENT: 5 % (ref 1–4)
EOSINOPHILS RELATIVE PERCENT: 5 % (ref 1–4)
EOSINOPHILS RELATIVE PERCENT: 6 % (ref 1–4)
GFR AFRICAN AMERICAN: >60 ML/MIN
GFR NON-AFRICAN AMERICAN: 50 ML/MIN
GFR NON-AFRICAN AMERICAN: >60 ML/MIN
GFR SERPL CREATININE-BSD FRML MDRD: ABNORMAL ML/MIN/{1.73_M2}
GLUCOSE BLD-MCNC: 107 MG/DL (ref 70–99)
GLUCOSE BLD-MCNC: 112 MG/DL (ref 70–99)
GLUCOSE BLD-MCNC: 86 MG/DL (ref 70–99)
GLUCOSE BLD-MCNC: 87 MG/DL (ref 70–99)
GLUCOSE BLD-MCNC: 87 MG/DL (ref 70–99)
GLUCOSE BLD-MCNC: 89 MG/DL (ref 70–99)
GLUCOSE BLD-MCNC: 89 MG/DL (ref 70–99)
GLUCOSE BLD-MCNC: 90 MG/DL (ref 70–99)
GLUCOSE BLD-MCNC: 91 MG/DL (ref 70–99)
GLUCOSE BLD-MCNC: 92 MG/DL (ref 70–99)
GLUCOSE BLD-MCNC: 93 MG/DL (ref 75–110)
HCT VFR BLD CALC: 28.2 % (ref 40.7–50.3)
HCT VFR BLD CALC: 28.9 % (ref 40.7–50.3)
HCT VFR BLD CALC: 29.1 % (ref 40.7–50.3)
HCT VFR BLD CALC: 29.4 % (ref 40.7–50.3)
HCT VFR BLD CALC: 30 % (ref 40.7–50.3)
HCT VFR BLD CALC: 30.1 % (ref 40.7–50.3)
HCT VFR BLD CALC: 30.4 % (ref 40.7–50.3)
HCT VFR BLD CALC: 31 % (ref 40.7–50.3)
HCT VFR BLD CALC: 31.5 % (ref 40.7–50.3)
HCT VFR BLD CALC: 33.2 % (ref 40.7–50.3)
HCT VFR BLD CALC: 34.3 % (ref 40.7–50.3)
HCT VFR BLD CALC: 37.3 % (ref 40.7–50.3)
HEMOGLOBIN: 10.1 G/DL (ref 13–17)
HEMOGLOBIN: 10.3 G/DL (ref 13–17)
HEMOGLOBIN: 11.3 G/DL (ref 13–17)
HEMOGLOBIN: 8.7 G/DL (ref 13–17)
HEMOGLOBIN: 8.7 G/DL (ref 13–17)
HEMOGLOBIN: 8.8 G/DL (ref 13–17)
HEMOGLOBIN: 8.8 G/DL (ref 13–17)
HEMOGLOBIN: 8.9 G/DL (ref 13–17)
HEMOGLOBIN: 9 G/DL (ref 13–17)
HEMOGLOBIN: 9.1 G/DL (ref 13–17)
HEMOGLOBIN: 9.1 G/DL (ref 13–17)
HEMOGLOBIN: 9.5 G/DL (ref 13–17)
IMMATURE GRANULOCYTES: 1 %
IMMATURE GRANULOCYTES: 2 %
IMMATURE GRANULOCYTES: 3 %
IMMATURE GRANULOCYTES: 4 %
IMMATURE GRANULOCYTES: 5 %
INR BLD: 1.2
INR BLD: 1.3
INR BLD: 1.4
INR BLD: 1.5
INR BLD: 1.6
INR BLD: 1.6
INR BLD: 1.7
INR BLD: 1.8
INR BLD: 1.8
INR BLD: 1.9
LYMPHOCYTES # BLD: 16 % (ref 24–43)
LYMPHOCYTES # BLD: 16 % (ref 24–44)
LYMPHOCYTES # BLD: 18 % (ref 24–43)
LYMPHOCYTES # BLD: 19 % (ref 24–43)
LYMPHOCYTES # BLD: 21 % (ref 24–43)
LYMPHOCYTES # BLD: 24 % (ref 24–43)
LYMPHOCYTES # BLD: 25 % (ref 24–43)
LYMPHOCYTES # BLD: 26 % (ref 24–43)
LYMPHOCYTES # BLD: 26 % (ref 24–43)
MAGNESIUM: 2.1 MG/DL (ref 1.6–2.6)
MAGNESIUM: 2.2 MG/DL (ref 1.6–2.6)
MAGNESIUM: 2.3 MG/DL (ref 1.6–2.6)
MAGNESIUM: 2.3 MG/DL (ref 1.6–2.6)
MCH RBC QN AUTO: 28.4 PG (ref 25.2–33.5)
MCH RBC QN AUTO: 28.5 PG (ref 25.2–33.5)
MCH RBC QN AUTO: 28.6 PG (ref 25.2–33.5)
MCH RBC QN AUTO: 28.7 PG (ref 25.2–33.5)
MCH RBC QN AUTO: 28.8 PG (ref 25.2–33.5)
MCH RBC QN AUTO: 28.9 PG (ref 25.2–33.5)
MCH RBC QN AUTO: 29 PG (ref 25.2–33.5)
MCH RBC QN AUTO: 29.2 PG (ref 25.2–33.5)
MCH RBC QN AUTO: 29.3 PG (ref 25.2–33.5)
MCH RBC QN AUTO: 29.4 PG (ref 25.2–33.5)
MCHC RBC AUTO-ENTMCNC: 29.3 G/DL (ref 28.4–34.8)
MCHC RBC AUTO-ENTMCNC: 29.4 G/DL (ref 28.4–34.8)
MCHC RBC AUTO-ENTMCNC: 29.6 G/DL (ref 28.4–34.8)
MCHC RBC AUTO-ENTMCNC: 30 G/DL (ref 28.4–34.8)
MCHC RBC AUTO-ENTMCNC: 30 G/DL (ref 28.4–34.8)
MCHC RBC AUTO-ENTMCNC: 30.2 G/DL (ref 28.4–34.8)
MCHC RBC AUTO-ENTMCNC: 30.3 G/DL (ref 28.4–34.8)
MCHC RBC AUTO-ENTMCNC: 30.4 G/DL (ref 28.4–34.8)
MCHC RBC AUTO-ENTMCNC: 30.4 G/DL (ref 28.4–34.8)
MCHC RBC AUTO-ENTMCNC: 30.9 G/DL (ref 28.4–34.8)
MCV RBC AUTO: 94.4 FL (ref 82.6–102.9)
MCV RBC AUTO: 94.6 FL (ref 82.6–102.9)
MCV RBC AUTO: 94.8 FL (ref 82.6–102.9)
MCV RBC AUTO: 95.6 FL (ref 82.6–102.9)
MCV RBC AUTO: 95.7 FL (ref 82.6–102.9)
MCV RBC AUTO: 95.9 FL (ref 82.6–102.9)
MCV RBC AUTO: 96.4 FL (ref 82.6–102.9)
MCV RBC AUTO: 96.5 FL (ref 82.6–102.9)
MCV RBC AUTO: 96.5 FL (ref 82.6–102.9)
MCV RBC AUTO: 97.1 FL (ref 82.6–102.9)
MCV RBC AUTO: 97.4 FL (ref 82.6–102.9)
MCV RBC AUTO: 97.5 FL (ref 82.6–102.9)
MONOCYTES # BLD: 3 % (ref 1–7)
MONOCYTES # BLD: 3 % (ref 3–12)
MONOCYTES # BLD: 4 % (ref 3–12)
MONOCYTES # BLD: 5 % (ref 3–12)
MONOCYTES # BLD: 5 % (ref 3–12)
MORPHOLOGY: ABNORMAL
NRBC AUTOMATED: 0 PER 100 WBC
NRBC AUTOMATED: 0.2 PER 100 WBC
NRBC AUTOMATED: 0.3 PER 100 WBC
PDW BLD-RTO: 19.1 % (ref 11.8–14.4)
PDW BLD-RTO: 19.5 % (ref 11.8–14.4)
PDW BLD-RTO: 19.5 % (ref 11.8–14.4)
PDW BLD-RTO: 19.6 % (ref 11.8–14.4)
PDW BLD-RTO: 19.7 % (ref 11.8–14.4)
PDW BLD-RTO: 19.8 % (ref 11.8–14.4)
PDW BLD-RTO: 19.9 % (ref 11.8–14.4)
PDW BLD-RTO: 19.9 % (ref 11.8–14.4)
PDW BLD-RTO: 20.4 % (ref 11.8–14.4)
PDW BLD-RTO: 20.5 % (ref 11.8–14.4)
PDW BLD-RTO: 20.7 % (ref 11.8–14.4)
PDW BLD-RTO: 20.9 % (ref 11.8–14.4)
PHOSPHORUS: 2.5 MG/DL (ref 2.5–4.5)
PHOSPHORUS: 2.5 MG/DL (ref 2.5–4.5)
PHOSPHORUS: 2.8 MG/DL (ref 2.5–4.5)
PHOSPHORUS: 2.9 MG/DL (ref 2.5–4.5)
PHOSPHORUS: 3.1 MG/DL (ref 2.5–4.5)
PHOSPHORUS: 3.2 MG/DL (ref 2.5–4.5)
PHOSPHORUS: 3.3 MG/DL (ref 2.5–4.5)
PLATELET # BLD: 205 K/UL (ref 138–453)
PLATELET # BLD: 220 K/UL (ref 138–453)
PLATELET # BLD: 222 K/UL (ref 138–453)
PLATELET # BLD: 236 K/UL (ref 138–453)
PLATELET # BLD: 241 K/UL (ref 138–453)
PLATELET # BLD: 280 K/UL (ref 138–453)
PLATELET # BLD: 314 K/UL (ref 138–453)
PLATELET # BLD: 316 K/UL (ref 138–453)
PLATELET # BLD: 360 K/UL (ref 138–453)
PLATELET # BLD: 400 K/UL (ref 138–453)
PLATELET # BLD: 436 K/UL (ref 138–453)
PLATELET # BLD: 606 K/UL (ref 138–453)
PLATELET ESTIMATE: ABNORMAL
PMV BLD AUTO: 10 FL (ref 8.1–13.5)
PMV BLD AUTO: 10.1 FL (ref 8.1–13.5)
PMV BLD AUTO: 10.2 FL (ref 8.1–13.5)
PMV BLD AUTO: 10.3 FL (ref 8.1–13.5)
PMV BLD AUTO: 10.4 FL (ref 8.1–13.5)
PMV BLD AUTO: 10.5 FL (ref 8.1–13.5)
PMV BLD AUTO: 10.5 FL (ref 8.1–13.5)
PMV BLD AUTO: 10.8 FL (ref 8.1–13.5)
PMV BLD AUTO: 10.8 FL (ref 8.1–13.5)
PMV BLD AUTO: 9.9 FL (ref 8.1–13.5)
POTASSIUM SERPL-SCNC: 3.1 MMOL/L (ref 3.7–5.3)
POTASSIUM SERPL-SCNC: 3.2 MMOL/L (ref 3.7–5.3)
POTASSIUM SERPL-SCNC: 3.2 MMOL/L (ref 3.7–5.3)
POTASSIUM SERPL-SCNC: 3.5 MMOL/L (ref 3.7–5.3)
POTASSIUM SERPL-SCNC: 3.6 MMOL/L (ref 3.7–5.3)
POTASSIUM SERPL-SCNC: 3.6 MMOL/L (ref 3.7–5.3)
POTASSIUM SERPL-SCNC: 3.8 MMOL/L (ref 3.7–5.3)
POTASSIUM SERPL-SCNC: 3.9 MMOL/L (ref 3.7–5.3)
POTASSIUM SERPL-SCNC: 4 MMOL/L (ref 3.7–5.3)
PROTHROMBIN TIME: 15.1 SEC (ref 11.5–14.2)
PROTHROMBIN TIME: 15.1 SEC (ref 9–12)
PROTHROMBIN TIME: 15.6 SEC (ref 11.5–14.2)
PROTHROMBIN TIME: 15.8 SEC (ref 9–12)
PROTHROMBIN TIME: 15.8 SEC (ref 9–12)
PROTHROMBIN TIME: 15.9 SEC (ref 11.5–14.2)
PROTHROMBIN TIME: 15.9 SEC (ref 11.5–14.2)
PROTHROMBIN TIME: 16 SEC (ref 11.5–14.2)
PROTHROMBIN TIME: 16 SEC (ref 11.5–14.2)
PROTHROMBIN TIME: 16.5 SEC (ref 9–12)
PROTHROMBIN TIME: 16.5 SEC (ref 9–12)
PROTHROMBIN TIME: 16.8 SEC (ref 11.5–14.2)
PROTHROMBIN TIME: 17.2 SEC (ref 9–12)
PROTHROMBIN TIME: 18 SEC (ref 9–12)
PROTHROMBIN TIME: 18.3 SEC (ref 9–12)
PROTHROMBIN TIME: 19 SEC (ref 9–12)
RBC # BLD: 2.98 M/UL (ref 4.21–5.77)
RBC # BLD: 3.05 M/UL (ref 4.21–5.77)
RBC # BLD: 3.06 M/UL (ref 4.21–5.77)
RBC # BLD: 3.07 M/UL (ref 4.21–5.77)
RBC # BLD: 3.11 M/UL (ref 4.21–5.77)
RBC # BLD: 3.13 M/UL (ref 4.21–5.77)
RBC # BLD: 3.15 M/UL (ref 4.21–5.77)
RBC # BLD: 3.18 M/UL (ref 4.21–5.77)
RBC # BLD: 3.29 M/UL (ref 4.21–5.77)
RBC # BLD: 3.44 M/UL (ref 4.21–5.77)
RBC # BLD: 3.52 M/UL (ref 4.21–5.77)
RBC # BLD: 3.89 M/UL (ref 4.21–5.77)
RBC # BLD: ABNORMAL 10*6/UL
SARS-COV-2, RAPID: NORMAL
SARS-COV-2: NORMAL
SARS-COV-2: NOT DETECTED
SEG NEUTROPHILS: 58 % (ref 36–65)
SEG NEUTROPHILS: 59 % (ref 36–65)
SEG NEUTROPHILS: 62 % (ref 36–65)
SEG NEUTROPHILS: 64 % (ref 36–65)
SEG NEUTROPHILS: 67 % (ref 36–65)
SEG NEUTROPHILS: 69 % (ref 36–65)
SEG NEUTROPHILS: 71 % (ref 36–65)
SEG NEUTROPHILS: 73 % (ref 36–66)
SEG NEUTROPHILS: 75 % (ref 36–65)
SEGMENTED NEUTROPHILS ABSOLUTE COUNT: 4.42 K/UL (ref 1.5–8.1)
SEGMENTED NEUTROPHILS ABSOLUTE COUNT: 4.51 K/UL (ref 1.5–8.1)
SEGMENTED NEUTROPHILS ABSOLUTE COUNT: 4.92 K/UL (ref 1.5–8.1)
SEGMENTED NEUTROPHILS ABSOLUTE COUNT: 5.81 K/UL (ref 1.5–8.1)
SEGMENTED NEUTROPHILS ABSOLUTE COUNT: 5.99 K/UL (ref 1.8–7.7)
SEGMENTED NEUTROPHILS ABSOLUTE COUNT: 6 K/UL (ref 1.5–8.1)
SEGMENTED NEUTROPHILS ABSOLUTE COUNT: 6.49 K/UL (ref 1.5–8.1)
SEGMENTED NEUTROPHILS ABSOLUTE COUNT: 7.81 K/UL (ref 1.5–8.1)
SEGMENTED NEUTROPHILS ABSOLUTE COUNT: 8.13 K/UL (ref 1.5–8.1)
SODIUM BLD-SCNC: 136 MMOL/L (ref 135–144)
SODIUM BLD-SCNC: 137 MMOL/L (ref 135–144)
SODIUM BLD-SCNC: 137 MMOL/L (ref 135–144)
SODIUM BLD-SCNC: 138 MMOL/L (ref 135–144)
SODIUM BLD-SCNC: 139 MMOL/L (ref 135–144)
SODIUM BLD-SCNC: 139 MMOL/L (ref 135–144)
SODIUM BLD-SCNC: 140 MMOL/L (ref 135–144)
SODIUM BLD-SCNC: 141 MMOL/L (ref 135–144)
SODIUM BLD-SCNC: 143 MMOL/L (ref 135–144)
SODIUM BLD-SCNC: 143 MMOL/L (ref 135–144)
SOURCE: NORMAL
TROPONIN INTERP: ABNORMAL
TROPONIN T: ABNORMAL NG/ML
TROPONIN, HIGH SENSITIVITY: 37 NG/L (ref 0–22)
WBC # BLD: 10.8 K/UL (ref 3.5–11.3)
WBC # BLD: 11 K/UL (ref 3.5–11.3)
WBC # BLD: 11.6 K/UL (ref 3.5–11.3)
WBC # BLD: 15.7 K/UL (ref 3.5–11.3)
WBC # BLD: 17 K/UL (ref 3.5–11.3)
WBC # BLD: 7.2 K/UL (ref 3.5–11.3)
WBC # BLD: 7.3 K/UL (ref 3.5–11.3)
WBC # BLD: 8.2 K/UL (ref 3.5–11.3)
WBC # BLD: 8.3 K/UL (ref 3.5–11.3)
WBC # BLD: 8.6 K/UL (ref 3.5–11.3)
WBC # BLD: 9.4 K/UL (ref 3.5–11.3)
WBC # BLD: 9.4 K/UL (ref 3.5–11.3)
WBC # BLD: ABNORMAL 10*3/UL

## 2021-01-01 PROCEDURE — 85610 PROTHROMBIN TIME: CPT

## 2021-01-01 PROCEDURE — 94640 AIRWAY INHALATION TREATMENT: CPT

## 2021-01-01 PROCEDURE — 83735 ASSAY OF MAGNESIUM: CPT

## 2021-01-01 PROCEDURE — 6360000002 HC RX W HCPCS: Performed by: INTERNAL MEDICINE

## 2021-01-01 PROCEDURE — 6370000000 HC RX 637 (ALT 250 FOR IP): Performed by: NURSE PRACTITIONER

## 2021-01-01 PROCEDURE — 94761 N-INVAS EAR/PLS OXIMETRY MLT: CPT

## 2021-01-01 PROCEDURE — 97116 GAIT TRAINING THERAPY: CPT

## 2021-01-01 PROCEDURE — 2700000000 HC OXYGEN THERAPY PER DAY

## 2021-01-01 PROCEDURE — 2060000000 HC ICU INTERMEDIATE R&B

## 2021-01-01 PROCEDURE — P9603 ONE-WAY ALLOW PRORATED MILES: HCPCS

## 2021-01-01 PROCEDURE — 99232 SBSQ HOSP IP/OBS MODERATE 35: CPT | Performed by: INTERNAL MEDICINE

## 2021-01-01 PROCEDURE — 36415 COLL VENOUS BLD VENIPUNCTURE: CPT

## 2021-01-01 PROCEDURE — 99232 SBSQ HOSP IP/OBS MODERATE 35: CPT | Performed by: FAMILY MEDICINE

## 2021-01-01 PROCEDURE — 6370000000 HC RX 637 (ALT 250 FOR IP): Performed by: INTERNAL MEDICINE

## 2021-01-01 PROCEDURE — 6360000002 HC RX W HCPCS: Performed by: FAMILY MEDICINE

## 2021-01-01 PROCEDURE — 99239 HOSP IP/OBS DSCHRG MGMT >30: CPT | Performed by: INTERNAL MEDICINE

## 2021-01-01 PROCEDURE — 2580000003 HC RX 258: Performed by: INTERNAL MEDICINE

## 2021-01-01 PROCEDURE — 82947 ASSAY GLUCOSE BLOOD QUANT: CPT

## 2021-01-01 PROCEDURE — 85027 COMPLETE CBC AUTOMATED: CPT

## 2021-01-01 PROCEDURE — 99233 SBSQ HOSP IP/OBS HIGH 50: CPT | Performed by: INTERNAL MEDICINE

## 2021-01-01 PROCEDURE — 85025 COMPLETE CBC W/AUTO DIFF WBC: CPT

## 2021-01-01 PROCEDURE — 80162 ASSAY OF DIGOXIN TOTAL: CPT

## 2021-01-01 PROCEDURE — 6370000000 HC RX 637 (ALT 250 FOR IP): Performed by: EMERGENCY MEDICINE

## 2021-01-01 PROCEDURE — 80069 RENAL FUNCTION PANEL: CPT

## 2021-01-01 PROCEDURE — 80048 BASIC METABOLIC PNL TOTAL CA: CPT

## 2021-01-01 PROCEDURE — 6360000002 HC RX W HCPCS: Performed by: NURSE PRACTITIONER

## 2021-01-01 PROCEDURE — 93308 TTE F-UP OR LMTD: CPT

## 2021-01-01 PROCEDURE — 71045 X-RAY EXAM CHEST 1 VIEW: CPT

## 2021-01-01 PROCEDURE — U0003 INFECTIOUS AGENT DETECTION BY NUCLEIC ACID (DNA OR RNA); SEVERE ACUTE RESPIRATORY SYNDROME CORONAVIRUS 2 (SARS-COV-2) (CORONAVIRUS DISEASE [COVID-19]), AMPLIFIED PROBE TECHNIQUE, MAKING USE OF HIGH THROUGHPUT TECHNOLOGIES AS DESCRIBED BY CMS-2020-01-R: HCPCS

## 2021-01-01 PROCEDURE — 2580000003 HC RX 258: Performed by: NURSE PRACTITIONER

## 2021-01-01 PROCEDURE — 94760 N-INVAS EAR/PLS OXIMETRY 1: CPT

## 2021-01-01 PROCEDURE — 97535 SELF CARE MNGMENT TRAINING: CPT

## 2021-01-01 PROCEDURE — 6370000000 HC RX 637 (ALT 250 FOR IP): Performed by: FAMILY MEDICINE

## 2021-01-01 PROCEDURE — 97110 THERAPEUTIC EXERCISES: CPT

## 2021-01-01 PROCEDURE — 6370000000 HC RX 637 (ALT 250 FOR IP): Performed by: STUDENT IN AN ORGANIZED HEALTH CARE EDUCATION/TRAINING PROGRAM

## 2021-01-01 PROCEDURE — 97530 THERAPEUTIC ACTIVITIES: CPT

## 2021-01-01 PROCEDURE — 84132 ASSAY OF SERUM POTASSIUM: CPT

## 2021-01-01 PROCEDURE — 84484 ASSAY OF TROPONIN QUANT: CPT

## 2021-01-01 PROCEDURE — 2580000003 HC RX 258: Performed by: STUDENT IN AN ORGANIZED HEALTH CARE EDUCATION/TRAINING PROGRAM

## 2021-01-01 RX ORDER — WARFARIN SODIUM 5 MG/1
5 TABLET ORAL
Status: COMPLETED | OUTPATIENT
Start: 2021-01-01 | End: 2021-01-01

## 2021-01-01 RX ORDER — BACITRACIN, NEOMYCIN, POLYMYXIN B 400; 3.5; 5 [USP'U]/G; MG/G; [USP'U]/G
OINTMENT TOPICAL 2 TIMES DAILY
Status: DISCONTINUED | OUTPATIENT
Start: 2021-01-01 | End: 2021-01-01 | Stop reason: HOSPADM

## 2021-01-01 RX ORDER — DIPHENHYDRAMINE HYDROCHLORIDE 50 MG/ML
25 INJECTION INTRAMUSCULAR; INTRAVENOUS NIGHTLY PRN
Status: DISCONTINUED | OUTPATIENT
Start: 2021-01-01 | End: 2021-01-01 | Stop reason: HOSPADM

## 2021-01-01 RX ORDER — ALBUTEROL SULFATE 2.5 MG/3ML
2.5 SOLUTION RESPIRATORY (INHALATION)
Qty: 120 EACH | Refills: 3 | Status: SHIPPED | OUTPATIENT
Start: 2021-01-01

## 2021-01-01 RX ORDER — WARFARIN SODIUM 2 MG/1
4 TABLET ORAL
Status: COMPLETED | OUTPATIENT
Start: 2021-01-01 | End: 2021-01-01

## 2021-01-01 RX ORDER — 0.9 % SODIUM CHLORIDE 0.9 %
500 INTRAVENOUS SOLUTION INTRAVENOUS ONCE
Status: COMPLETED | OUTPATIENT
Start: 2021-01-01 | End: 2021-01-01

## 2021-01-01 RX ORDER — DIPHENHYDRAMINE HYDROCHLORIDE 50 MG/ML
25 INJECTION INTRAMUSCULAR; INTRAVENOUS ONCE
Status: COMPLETED | OUTPATIENT
Start: 2021-01-01 | End: 2021-01-01

## 2021-01-01 RX ORDER — FUROSEMIDE 40 MG/1
40 TABLET ORAL DAILY
Status: DISCONTINUED | OUTPATIENT
Start: 2021-01-01 | End: 2021-01-01 | Stop reason: HOSPADM

## 2021-01-01 RX ORDER — MIDODRINE HYDROCHLORIDE 5 MG/1
5 TABLET ORAL
Qty: 30 TABLET | Refills: 0 | Status: SHIPPED | OUTPATIENT
Start: 2021-01-01

## 2021-01-01 RX ORDER — WARFARIN SODIUM 7.5 MG/1
7.5 TABLET ORAL
Status: COMPLETED | OUTPATIENT
Start: 2021-01-01 | End: 2021-01-01

## 2021-01-01 RX ORDER — POTASSIUM CHLORIDE 20 MEQ/1
20 TABLET, EXTENDED RELEASE ORAL ONCE
Status: DISCONTINUED | OUTPATIENT
Start: 2021-01-01 | End: 2021-01-01 | Stop reason: HOSPADM

## 2021-01-01 RX ORDER — FUROSEMIDE 10 MG/ML
20 INJECTION INTRAMUSCULAR; INTRAVENOUS ONCE
Status: DISCONTINUED | OUTPATIENT
Start: 2021-01-01 | End: 2021-01-01

## 2021-01-01 RX ORDER — POTASSIUM CHLORIDE 20 MEQ/1
20 TABLET, EXTENDED RELEASE ORAL ONCE
Status: COMPLETED | OUTPATIENT
Start: 2021-01-01 | End: 2021-01-01

## 2021-01-01 RX ORDER — IPRATROPIUM BROMIDE AND ALBUTEROL SULFATE 2.5; .5 MG/3ML; MG/3ML
3 SOLUTION RESPIRATORY (INHALATION) EVERY 4 HOURS PRN
Qty: 360 ML | Refills: 0 | Status: SHIPPED | OUTPATIENT
Start: 2021-01-01

## 2021-01-01 RX ORDER — WARFARIN SODIUM 3 MG/1
3 TABLET ORAL
Status: COMPLETED | OUTPATIENT
Start: 2021-01-01 | End: 2021-01-01

## 2021-01-01 RX ADMIN — MIDODRINE HYDROCHLORIDE 5 MG: 5 TABLET ORAL at 13:44

## 2021-01-01 RX ADMIN — AMPICILLIN SODIUM AND SULBACTAM SODIUM 3 G: 2; 1 INJECTION, POWDER, FOR SOLUTION INTRAMUSCULAR; INTRAVENOUS at 23:13

## 2021-01-01 RX ADMIN — AMPICILLIN SODIUM AND SULBACTAM SODIUM 3 G: 2; 1 INJECTION, POWDER, FOR SOLUTION INTRAMUSCULAR; INTRAVENOUS at 05:52

## 2021-01-01 RX ADMIN — AMPICILLIN SODIUM AND SULBACTAM SODIUM 3 G: 2; 1 INJECTION, POWDER, FOR SOLUTION INTRAMUSCULAR; INTRAVENOUS at 12:33

## 2021-01-01 RX ADMIN — DILTIAZEM HYDROCHLORIDE 120 MG: 120 CAPSULE, COATED, EXTENDED RELEASE ORAL at 08:28

## 2021-01-01 RX ADMIN — IPRATROPIUM BROMIDE AND ALBUTEROL SULFATE 1 AMPULE: .5; 3 SOLUTION RESPIRATORY (INHALATION) at 16:37

## 2021-01-01 RX ADMIN — FINASTERIDE 5 MG: 5 TABLET, FILM COATED ORAL at 10:13

## 2021-01-01 RX ADMIN — AMPICILLIN SODIUM AND SULBACTAM SODIUM 3 G: 2; 1 INJECTION, POWDER, FOR SOLUTION INTRAMUSCULAR; INTRAVENOUS at 23:57

## 2021-01-01 RX ADMIN — IPRATROPIUM BROMIDE AND ALBUTEROL SULFATE 1 AMPULE: .5; 3 SOLUTION RESPIRATORY (INHALATION) at 11:46

## 2021-01-01 RX ADMIN — DIGOXIN 125 MCG: 125 TABLET ORAL at 09:10

## 2021-01-01 RX ADMIN — POLYMYXIN B SULFATE, BACITRACIN ZINC, NEOMYCIN SULFATE: 5000; 3.5; 4 OINTMENT TOPICAL at 20:13

## 2021-01-01 RX ADMIN — Medication 10 ML: at 11:43

## 2021-01-01 RX ADMIN — Medication 10 ML: at 01:09

## 2021-01-01 RX ADMIN — POLYMYXIN B SULFATE, BACITRACIN ZINC, NEOMYCIN SULFATE: 5000; 3.5; 4 OINTMENT TOPICAL at 10:12

## 2021-01-01 RX ADMIN — WARFARIN SODIUM 5 MG: 5 TABLET ORAL at 17:21

## 2021-01-01 RX ADMIN — DIPHENHYDRAMINE HYDROCHLORIDE 25 MG: 50 INJECTION, SOLUTION INTRAMUSCULAR; INTRAVENOUS at 22:57

## 2021-01-01 RX ADMIN — TAMSULOSIN HYDROCHLORIDE 0.8 MG: 0.4 CAPSULE ORAL at 20:33

## 2021-01-01 RX ADMIN — Medication 10 ML: at 06:40

## 2021-01-01 RX ADMIN — Medication 10 ML: at 05:33

## 2021-01-01 RX ADMIN — FINASTERIDE 5 MG: 5 TABLET, FILM COATED ORAL at 11:30

## 2021-01-01 RX ADMIN — DIPHENHYDRAMINE HYDROCHLORIDE 25 MG: 50 INJECTION, SOLUTION INTRAMUSCULAR; INTRAVENOUS at 05:08

## 2021-01-01 RX ADMIN — ALCOHOL 1 TABLET: 70.47 GEL TOPICAL at 09:11

## 2021-01-01 RX ADMIN — POLYMYXIN B SULFATE, BACITRACIN ZINC, NEOMYCIN SULFATE: 5000; 3.5; 4 OINTMENT TOPICAL at 13:44

## 2021-01-01 RX ADMIN — POLYMYXIN B SULFATE, BACITRACIN ZINC, NEOMYCIN SULFATE: 5000; 3.5; 4 OINTMENT TOPICAL at 09:12

## 2021-01-01 RX ADMIN — AMPICILLIN SODIUM AND SULBACTAM SODIUM 3 G: 2; 1 INJECTION, POWDER, FOR SOLUTION INTRAMUSCULAR; INTRAVENOUS at 16:20

## 2021-01-01 RX ADMIN — ENOXAPARIN SODIUM 80 MG: 80 INJECTION SUBCUTANEOUS at 20:20

## 2021-01-01 RX ADMIN — IPRATROPIUM BROMIDE AND ALBUTEROL SULFATE 1 AMPULE: .5; 3 SOLUTION RESPIRATORY (INHALATION) at 20:51

## 2021-01-01 RX ADMIN — DILTIAZEM HYDROCHLORIDE 120 MG: 120 CAPSULE, COATED, EXTENDED RELEASE ORAL at 08:10

## 2021-01-01 RX ADMIN — WARFARIN SODIUM 4 MG: 2 TABLET ORAL at 17:00

## 2021-01-01 RX ADMIN — DIGOXIN 125 MCG: 125 TABLET ORAL at 08:10

## 2021-01-01 RX ADMIN — Medication 10 ML: at 06:55

## 2021-01-01 RX ADMIN — MIDODRINE HYDROCHLORIDE 5 MG: 5 TABLET ORAL at 08:29

## 2021-01-01 RX ADMIN — MIDODRINE HYDROCHLORIDE 5 MG: 5 TABLET ORAL at 08:03

## 2021-01-01 RX ADMIN — TAMSULOSIN HYDROCHLORIDE 0.8 MG: 0.4 CAPSULE ORAL at 20:41

## 2021-01-01 RX ADMIN — FINASTERIDE 5 MG: 5 TABLET, FILM COATED ORAL at 09:09

## 2021-01-01 RX ADMIN — DIGOXIN 125 MCG: 125 TABLET ORAL at 10:13

## 2021-01-01 RX ADMIN — MIDODRINE HYDROCHLORIDE 5 MG: 5 TABLET ORAL at 09:00

## 2021-01-01 RX ADMIN — Medication 10 ML: at 17:01

## 2021-01-01 RX ADMIN — ENOXAPARIN SODIUM 80 MG: 80 INJECTION SUBCUTANEOUS at 08:28

## 2021-01-01 RX ADMIN — ENOXAPARIN SODIUM 80 MG: 80 INJECTION SUBCUTANEOUS at 08:03

## 2021-01-01 RX ADMIN — LEVOTHYROXINE SODIUM 75 MCG: 75 TABLET ORAL at 09:09

## 2021-01-01 RX ADMIN — FINASTERIDE 5 MG: 5 TABLET, FILM COATED ORAL at 08:03

## 2021-01-01 RX ADMIN — IPRATROPIUM BROMIDE AND ALBUTEROL SULFATE 1 AMPULE: .5; 3 SOLUTION RESPIRATORY (INHALATION) at 10:14

## 2021-01-01 RX ADMIN — IPRATROPIUM BROMIDE AND ALBUTEROL SULFATE 1 AMPULE: .5; 3 SOLUTION RESPIRATORY (INHALATION) at 09:03

## 2021-01-01 RX ADMIN — Medication 10 ML: at 23:14

## 2021-01-01 RX ADMIN — DIGOXIN 125 MCG: 125 TABLET ORAL at 08:28

## 2021-01-01 RX ADMIN — DILTIAZEM HYDROCHLORIDE 120 MG: 120 CAPSULE, COATED, EXTENDED RELEASE ORAL at 11:30

## 2021-01-01 RX ADMIN — MIDODRINE HYDROCHLORIDE 5 MG: 5 TABLET ORAL at 17:11

## 2021-01-01 RX ADMIN — ENOXAPARIN SODIUM 80 MG: 80 INJECTION SUBCUTANEOUS at 20:41

## 2021-01-01 RX ADMIN — TAMSULOSIN HYDROCHLORIDE 0.8 MG: 0.4 CAPSULE ORAL at 21:55

## 2021-01-01 RX ADMIN — FINASTERIDE 5 MG: 5 TABLET, FILM COATED ORAL at 08:28

## 2021-01-01 RX ADMIN — Medication 10 ML: at 17:15

## 2021-01-01 RX ADMIN — DIGOXIN 125 MCG: 125 TABLET ORAL at 08:03

## 2021-01-01 RX ADMIN — POLYMYXIN B SULFATE, BACITRACIN ZINC, NEOMYCIN SULFATE: 5000; 3.5; 4 OINTMENT TOPICAL at 09:10

## 2021-01-01 RX ADMIN — WARFARIN SODIUM 5 MG: 5 TABLET ORAL at 17:08

## 2021-01-01 RX ADMIN — DILTIAZEM HYDROCHLORIDE 120 MG: 120 CAPSULE, COATED, EXTENDED RELEASE ORAL at 09:08

## 2021-01-01 RX ADMIN — POLYMYXIN B SULFATE, BACITRACIN ZINC, NEOMYCIN SULFATE: 5000; 3.5; 4 OINTMENT TOPICAL at 09:52

## 2021-01-01 RX ADMIN — AMPICILLIN SODIUM AND SULBACTAM SODIUM 3 G: 2; 1 INJECTION, POWDER, FOR SOLUTION INTRAMUSCULAR; INTRAVENOUS at 23:18

## 2021-01-01 RX ADMIN — ENOXAPARIN SODIUM 80 MG: 80 INJECTION SUBCUTANEOUS at 20:33

## 2021-01-01 RX ADMIN — POLYMYXIN B SULFATE, BACITRACIN ZINC, NEOMYCIN SULFATE: 5000; 3.5; 4 OINTMENT TOPICAL at 21:32

## 2021-01-01 RX ADMIN — WARFARIN SODIUM 3 MG: 3 TABLET ORAL at 22:22

## 2021-01-01 RX ADMIN — POLYMYXIN B SULFATE, BACITRACIN ZINC, NEOMYCIN SULFATE: 5000; 3.5; 4 OINTMENT TOPICAL at 11:49

## 2021-01-01 RX ADMIN — ALCOHOL 1 TABLET: 70.47 GEL TOPICAL at 09:00

## 2021-01-01 RX ADMIN — AMPICILLIN SODIUM AND SULBACTAM SODIUM 3 G: 2; 1 INJECTION, POWDER, FOR SOLUTION INTRAMUSCULAR; INTRAVENOUS at 05:08

## 2021-01-01 RX ADMIN — Medication 10 ML: at 00:31

## 2021-01-01 RX ADMIN — DIPHENHYDRAMINE HYDROCHLORIDE 25 MG: 50 INJECTION, SOLUTION INTRAMUSCULAR; INTRAVENOUS at 21:32

## 2021-01-01 RX ADMIN — LEVOTHYROXINE SODIUM 75 MCG: 75 TABLET ORAL at 10:13

## 2021-01-01 RX ADMIN — IPRATROPIUM BROMIDE AND ALBUTEROL SULFATE 1 AMPULE: .5; 3 SOLUTION RESPIRATORY (INHALATION) at 20:19

## 2021-01-01 RX ADMIN — FUROSEMIDE 40 MG: 40 TABLET ORAL at 15:34

## 2021-01-01 RX ADMIN — Medication 10 ML: at 17:10

## 2021-01-01 RX ADMIN — TAMSULOSIN HYDROCHLORIDE 0.8 MG: 0.4 CAPSULE ORAL at 20:26

## 2021-01-01 RX ADMIN — ENOXAPARIN SODIUM 80 MG: 80 INJECTION SUBCUTANEOUS at 21:32

## 2021-01-01 RX ADMIN — IPRATROPIUM BROMIDE AND ALBUTEROL SULFATE 1 AMPULE: .5; 3 SOLUTION RESPIRATORY (INHALATION) at 15:33

## 2021-01-01 RX ADMIN — POLYMYXIN B SULFATE, BACITRACIN ZINC, NEOMYCIN SULFATE: 5000; 3.5; 4 OINTMENT TOPICAL at 08:03

## 2021-01-01 RX ADMIN — SODIUM CHLORIDE, PRESERVATIVE FREE 10 ML: 5 INJECTION INTRAVENOUS at 21:32

## 2021-01-01 RX ADMIN — ENOXAPARIN SODIUM 80 MG: 80 INJECTION SUBCUTANEOUS at 21:55

## 2021-01-01 RX ADMIN — MIDODRINE HYDROCHLORIDE 5 MG: 5 TABLET ORAL at 16:59

## 2021-01-01 RX ADMIN — Medication 10 ML: at 05:57

## 2021-01-01 RX ADMIN — IPRATROPIUM BROMIDE AND ALBUTEROL SULFATE 1 AMPULE: .5; 3 SOLUTION RESPIRATORY (INHALATION) at 21:25

## 2021-01-01 RX ADMIN — DILTIAZEM HYDROCHLORIDE 120 MG: 120 CAPSULE, COATED, EXTENDED RELEASE ORAL at 08:23

## 2021-01-01 RX ADMIN — DIGOXIN 125 MCG: 125 TABLET ORAL at 09:12

## 2021-01-01 RX ADMIN — MIDODRINE HYDROCHLORIDE 5 MG: 5 TABLET ORAL at 10:12

## 2021-01-01 RX ADMIN — DILTIAZEM HYDROCHLORIDE 120 MG: 120 CAPSULE, COATED, EXTENDED RELEASE ORAL at 10:13

## 2021-01-01 RX ADMIN — Medication 10 ML: at 01:31

## 2021-01-01 RX ADMIN — SODIUM CHLORIDE 500 ML: 0.9 INJECTION, SOLUTION INTRAVENOUS at 08:36

## 2021-01-01 RX ADMIN — AMPICILLIN SODIUM AND SULBACTAM SODIUM 3 G: 2; 1 INJECTION, POWDER, FOR SOLUTION INTRAMUSCULAR; INTRAVENOUS at 06:17

## 2021-01-01 RX ADMIN — TAMSULOSIN HYDROCHLORIDE 0.8 MG: 0.4 CAPSULE ORAL at 21:32

## 2021-01-01 RX ADMIN — Medication 10 ML: at 19:39

## 2021-01-01 RX ADMIN — IPRATROPIUM BROMIDE AND ALBUTEROL SULFATE 1 AMPULE: .5; 3 SOLUTION RESPIRATORY (INHALATION) at 08:28

## 2021-01-01 RX ADMIN — LEVOTHYROXINE SODIUM 75 MCG: 75 TABLET ORAL at 09:12

## 2021-01-01 RX ADMIN — AMPICILLIN SODIUM AND SULBACTAM SODIUM 3 G: 2; 1 INJECTION, POWDER, FOR SOLUTION INTRAMUSCULAR; INTRAVENOUS at 17:00

## 2021-01-01 RX ADMIN — MIDODRINE HYDROCHLORIDE 5 MG: 5 TABLET ORAL at 08:10

## 2021-01-01 RX ADMIN — ENOXAPARIN SODIUM 80 MG: 80 INJECTION SUBCUTANEOUS at 20:27

## 2021-01-01 RX ADMIN — POLYMYXIN B SULFATE, BACITRACIN ZINC, NEOMYCIN SULFATE: 5000; 3.5; 4 OINTMENT TOPICAL at 09:00

## 2021-01-01 RX ADMIN — MIDODRINE HYDROCHLORIDE 5 MG: 5 TABLET ORAL at 12:09

## 2021-01-01 RX ADMIN — LEVOTHYROXINE SODIUM 75 MCG: 75 TABLET ORAL at 08:03

## 2021-01-01 RX ADMIN — WARFARIN SODIUM 4 MG: 2 TABLET ORAL at 17:09

## 2021-01-01 RX ADMIN — Medication 10 ML: at 12:40

## 2021-01-01 RX ADMIN — ENOXAPARIN SODIUM 80 MG: 80 INJECTION SUBCUTANEOUS at 08:23

## 2021-01-01 RX ADMIN — ENOXAPARIN SODIUM 80 MG: 80 INJECTION SUBCUTANEOUS at 20:13

## 2021-01-01 RX ADMIN — SODIUM CHLORIDE, PRESERVATIVE FREE 10 ML: 5 INJECTION INTRAVENOUS at 08:24

## 2021-01-01 RX ADMIN — ACETAMINOPHEN 650 MG: 325 TABLET ORAL at 03:18

## 2021-01-01 RX ADMIN — POLYMYXIN B SULFATE, BACITRACIN ZINC, NEOMYCIN SULFATE: 5000; 3.5; 4 OINTMENT TOPICAL at 08:24

## 2021-01-01 RX ADMIN — DIGOXIN 125 MCG: 125 TABLET ORAL at 11:31

## 2021-01-01 RX ADMIN — LEVOTHYROXINE SODIUM 75 MCG: 75 TABLET ORAL at 08:10

## 2021-01-01 RX ADMIN — MIDODRINE HYDROCHLORIDE 5 MG: 5 TABLET ORAL at 09:12

## 2021-01-01 RX ADMIN — MIDODRINE HYDROCHLORIDE 5 MG: 5 TABLET ORAL at 17:23

## 2021-01-01 RX ADMIN — AMPICILLIN SODIUM AND SULBACTAM SODIUM 3 G: 2; 1 INJECTION, POWDER, FOR SOLUTION INTRAMUSCULAR; INTRAVENOUS at 23:49

## 2021-01-01 RX ADMIN — MIDODRINE HYDROCHLORIDE 5 MG: 5 TABLET ORAL at 17:08

## 2021-01-01 RX ADMIN — AMPICILLIN SODIUM AND SULBACTAM SODIUM 3 G: 2; 1 INJECTION, POWDER, FOR SOLUTION INTRAMUSCULAR; INTRAVENOUS at 17:07

## 2021-01-01 RX ADMIN — POLYMYXIN B SULFATE, BACITRACIN ZINC, NEOMYCIN SULFATE: 5000; 3.5; 4 OINTMENT TOPICAL at 20:51

## 2021-01-01 RX ADMIN — FINASTERIDE 5 MG: 5 TABLET, FILM COATED ORAL at 09:00

## 2021-01-01 RX ADMIN — MIDODRINE HYDROCHLORIDE 5 MG: 5 TABLET ORAL at 17:00

## 2021-01-01 RX ADMIN — SODIUM CHLORIDE, PRESERVATIVE FREE 10 ML: 5 INJECTION INTRAVENOUS at 09:11

## 2021-01-01 RX ADMIN — IPRATROPIUM BROMIDE AND ALBUTEROL SULFATE 1 AMPULE: .5; 3 SOLUTION RESPIRATORY (INHALATION) at 09:01

## 2021-01-01 RX ADMIN — AMPICILLIN SODIUM AND SULBACTAM SODIUM 3 G: 2; 1 INJECTION, POWDER, FOR SOLUTION INTRAMUSCULAR; INTRAVENOUS at 17:41

## 2021-01-01 RX ADMIN — POLYMYXIN B SULFATE, BACITRACIN ZINC, NEOMYCIN SULFATE: 5000; 3.5; 4 OINTMENT TOPICAL at 20:21

## 2021-01-01 RX ADMIN — POLYMYXIN B SULFATE, BACITRACIN ZINC, NEOMYCIN SULFATE: 5000; 3.5; 4 OINTMENT TOPICAL at 22:26

## 2021-01-01 RX ADMIN — AMPICILLIN SODIUM AND SULBACTAM SODIUM 3 G: 2; 1 INJECTION, POWDER, FOR SOLUTION INTRAMUSCULAR; INTRAVENOUS at 06:01

## 2021-01-01 RX ADMIN — SODIUM CHLORIDE, PRESERVATIVE FREE 10 ML: 5 INJECTION INTRAVENOUS at 09:09

## 2021-01-01 RX ADMIN — IPRATROPIUM BROMIDE AND ALBUTEROL SULFATE 1 AMPULE: .5; 3 SOLUTION RESPIRATORY (INHALATION) at 20:48

## 2021-01-01 RX ADMIN — AMPICILLIN SODIUM AND SULBACTAM SODIUM 3 G: 2; 1 INJECTION, POWDER, FOR SOLUTION INTRAMUSCULAR; INTRAVENOUS at 23:07

## 2021-01-01 RX ADMIN — DIGOXIN 125 MCG: 125 TABLET ORAL at 09:00

## 2021-01-01 RX ADMIN — POLYMYXIN B SULFATE, BACITRACIN ZINC, NEOMYCIN SULFATE: 5000; 3.5; 4 OINTMENT TOPICAL at 20:33

## 2021-01-01 RX ADMIN — IPRATROPIUM BROMIDE AND ALBUTEROL SULFATE 1 AMPULE: .5; 3 SOLUTION RESPIRATORY (INHALATION) at 20:39

## 2021-01-01 RX ADMIN — DIGOXIN 125 MCG: 125 TABLET ORAL at 09:47

## 2021-01-01 RX ADMIN — IPRATROPIUM BROMIDE AND ALBUTEROL SULFATE 1 AMPULE: .5; 3 SOLUTION RESPIRATORY (INHALATION) at 09:25

## 2021-01-01 RX ADMIN — LEVOTHYROXINE SODIUM 75 MCG: 75 TABLET ORAL at 11:32

## 2021-01-01 RX ADMIN — IPRATROPIUM BROMIDE AND ALBUTEROL SULFATE 1 AMPULE: .5; 3 SOLUTION RESPIRATORY (INHALATION) at 16:00

## 2021-01-01 RX ADMIN — Medication 10 ML: at 11:31

## 2021-01-01 RX ADMIN — Medication 10 ML: at 05:11

## 2021-01-01 RX ADMIN — IPRATROPIUM BROMIDE AND ALBUTEROL SULFATE 1 AMPULE: .5; 3 SOLUTION RESPIRATORY (INHALATION) at 13:22

## 2021-01-01 RX ADMIN — WARFARIN SODIUM 7.5 MG: 7.5 TABLET ORAL at 17:14

## 2021-01-01 RX ADMIN — IPRATROPIUM BROMIDE AND ALBUTEROL SULFATE 1 AMPULE: .5; 3 SOLUTION RESPIRATORY (INHALATION) at 11:35

## 2021-01-01 RX ADMIN — ALCOHOL 1 TABLET: 70.47 GEL TOPICAL at 08:28

## 2021-01-01 RX ADMIN — Medication 10 ML: at 23:50

## 2021-01-01 RX ADMIN — IPRATROPIUM BROMIDE AND ALBUTEROL SULFATE 1 AMPULE: .5; 3 SOLUTION RESPIRATORY (INHALATION) at 19:35

## 2021-01-01 RX ADMIN — ENOXAPARIN SODIUM 80 MG: 80 INJECTION SUBCUTANEOUS at 11:31

## 2021-01-01 RX ADMIN — FINASTERIDE 5 MG: 5 TABLET, FILM COATED ORAL at 08:10

## 2021-01-01 RX ADMIN — WARFARIN SODIUM 4 MG: 2 TABLET ORAL at 17:42

## 2021-01-01 RX ADMIN — IPRATROPIUM BROMIDE AND ALBUTEROL SULFATE 1 AMPULE: .5; 3 SOLUTION RESPIRATORY (INHALATION) at 19:40

## 2021-01-01 RX ADMIN — Medication 10 ML: at 11:26

## 2021-01-01 RX ADMIN — SODIUM CHLORIDE, PRESERVATIVE FREE 10 ML: 5 INJECTION INTRAVENOUS at 11:48

## 2021-01-01 RX ADMIN — DIPHENHYDRAMINE HYDROCHLORIDE 25 MG: 50 INJECTION, SOLUTION INTRAMUSCULAR; INTRAVENOUS at 22:05

## 2021-01-01 RX ADMIN — Medication 10 ML: at 12:18

## 2021-01-01 RX ADMIN — IPRATROPIUM BROMIDE AND ALBUTEROL SULFATE 1 AMPULE: .5; 3 SOLUTION RESPIRATORY (INHALATION) at 07:54

## 2021-01-01 RX ADMIN — ALCOHOL 1 TABLET: 70.47 GEL TOPICAL at 10:12

## 2021-01-01 RX ADMIN — MIDODRINE HYDROCHLORIDE 5 MG: 5 TABLET ORAL at 11:43

## 2021-01-01 RX ADMIN — MIDODRINE HYDROCHLORIDE 5 MG: 5 TABLET ORAL at 13:08

## 2021-01-01 RX ADMIN — Medication 10 ML: at 17:08

## 2021-01-01 RX ADMIN — MIDODRINE HYDROCHLORIDE 5 MG: 5 TABLET ORAL at 08:24

## 2021-01-01 RX ADMIN — Medication 10 ML: at 17:23

## 2021-01-01 RX ADMIN — MIDODRINE HYDROCHLORIDE 5 MG: 5 TABLET ORAL at 11:26

## 2021-01-01 RX ADMIN — TAMSULOSIN HYDROCHLORIDE 0.8 MG: 0.4 CAPSULE ORAL at 20:13

## 2021-01-01 RX ADMIN — ALCOHOL 1 TABLET: 70.47 GEL TOPICAL at 08:03

## 2021-01-01 RX ADMIN — LEVOTHYROXINE SODIUM 75 MCG: 75 TABLET ORAL at 08:23

## 2021-01-01 RX ADMIN — MIDODRINE HYDROCHLORIDE 5 MG: 5 TABLET ORAL at 12:18

## 2021-01-01 RX ADMIN — MIDODRINE HYDROCHLORIDE 5 MG: 5 TABLET ORAL at 17:43

## 2021-01-01 RX ADMIN — ENOXAPARIN SODIUM 80 MG: 80 INJECTION SUBCUTANEOUS at 09:11

## 2021-01-01 RX ADMIN — IPRATROPIUM BROMIDE AND ALBUTEROL SULFATE 1 AMPULE: .5; 3 SOLUTION RESPIRATORY (INHALATION) at 12:25

## 2021-01-01 RX ADMIN — ALCOHOL 1 TABLET: 70.47 GEL TOPICAL at 11:28

## 2021-01-01 RX ADMIN — ALCOHOL 1 TABLET: 70.47 GEL TOPICAL at 08:09

## 2021-01-01 RX ADMIN — Medication 10 ML: at 02:50

## 2021-01-01 RX ADMIN — Medication 10 ML: at 17:43

## 2021-01-01 RX ADMIN — TAMSULOSIN HYDROCHLORIDE 0.8 MG: 0.4 CAPSULE ORAL at 20:20

## 2021-01-01 RX ADMIN — IPRATROPIUM BROMIDE AND ALBUTEROL SULFATE 1 AMPULE: .5; 3 SOLUTION RESPIRATORY (INHALATION) at 10:12

## 2021-01-01 RX ADMIN — AMPICILLIN SODIUM AND SULBACTAM SODIUM 3 G: 2; 1 INJECTION, POWDER, FOR SOLUTION INTRAMUSCULAR; INTRAVENOUS at 05:19

## 2021-01-01 RX ADMIN — WARFARIN SODIUM 5 MG: 5 TABLET ORAL at 17:01

## 2021-01-01 RX ADMIN — LEVOTHYROXINE SODIUM 75 MCG: 75 TABLET ORAL at 08:28

## 2021-01-01 RX ADMIN — ALCOHOL 1 TABLET: 70.47 GEL TOPICAL at 09:09

## 2021-01-01 RX ADMIN — POLYMYXIN B SULFATE, BACITRACIN ZINC, NEOMYCIN SULFATE: 5000; 3.5; 4 OINTMENT TOPICAL at 20:49

## 2021-01-01 RX ADMIN — DIPHENHYDRAMINE HYDROCHLORIDE 25 MG: 50 INJECTION, SOLUTION INTRAMUSCULAR; INTRAVENOUS at 01:00

## 2021-01-01 RX ADMIN — ENOXAPARIN SODIUM 80 MG: 80 INJECTION SUBCUTANEOUS at 09:09

## 2021-01-01 RX ADMIN — MIDODRINE HYDROCHLORIDE 5 MG: 5 TABLET ORAL at 09:08

## 2021-01-01 RX ADMIN — MIDODRINE HYDROCHLORIDE 5 MG: 5 TABLET ORAL at 17:14

## 2021-01-01 RX ADMIN — FINASTERIDE 5 MG: 5 TABLET, FILM COATED ORAL at 09:12

## 2021-01-01 RX ADMIN — IPRATROPIUM BROMIDE AND ALBUTEROL SULFATE 1 AMPULE: .5; 3 SOLUTION RESPIRATORY (INHALATION) at 16:06

## 2021-01-01 RX ADMIN — IPRATROPIUM BROMIDE AND ALBUTEROL SULFATE 1 AMPULE: .5; 3 SOLUTION RESPIRATORY (INHALATION) at 09:40

## 2021-01-01 RX ADMIN — Medication 10 ML: at 06:01

## 2021-01-01 RX ADMIN — MIDODRINE HYDROCHLORIDE 5 MG: 5 TABLET ORAL at 12:40

## 2021-01-01 RX ADMIN — POLYMYXIN B SULFATE, BACITRACIN ZINC, NEOMYCIN SULFATE: 5000; 3.5; 4 OINTMENT TOPICAL at 21:55

## 2021-01-01 RX ADMIN — AMPICILLIN SODIUM AND SULBACTAM SODIUM 3 G: 2; 1 INJECTION, POWDER, FOR SOLUTION INTRAMUSCULAR; INTRAVENOUS at 10:36

## 2021-01-01 RX ADMIN — AMPICILLIN SODIUM AND SULBACTAM SODIUM 3 G: 2; 1 INJECTION, POWDER, FOR SOLUTION INTRAMUSCULAR; INTRAVENOUS at 10:06

## 2021-01-01 RX ADMIN — Medication 10 ML: at 23:57

## 2021-01-01 RX ADMIN — DIPHENHYDRAMINE HYDROCHLORIDE 25 MG: 50 INJECTION, SOLUTION INTRAMUSCULAR; INTRAVENOUS at 00:05

## 2021-01-01 RX ADMIN — LEVOTHYROXINE SODIUM 75 MCG: 75 TABLET ORAL at 09:00

## 2021-01-01 RX ADMIN — ENOXAPARIN SODIUM 80 MG: 80 INJECTION SUBCUTANEOUS at 09:00

## 2021-01-01 RX ADMIN — ALCOHOL 1 TABLET: 70.47 GEL TOPICAL at 08:24

## 2021-01-01 RX ADMIN — DILTIAZEM HYDROCHLORIDE 120 MG: 120 CAPSULE, COATED, EXTENDED RELEASE ORAL at 09:12

## 2021-01-01 RX ADMIN — TAMSULOSIN HYDROCHLORIDE 0.8 MG: 0.4 CAPSULE ORAL at 22:29

## 2021-01-01 RX ADMIN — AMPICILLIN SODIUM AND SULBACTAM SODIUM 3 G: 2; 1 INJECTION, POWDER, FOR SOLUTION INTRAMUSCULAR; INTRAVENOUS at 10:19

## 2021-01-01 RX ADMIN — FINASTERIDE 5 MG: 5 TABLET, FILM COATED ORAL at 08:23

## 2021-01-01 RX ADMIN — POTASSIUM CHLORIDE 20 MEQ: 1500 TABLET, EXTENDED RELEASE ORAL at 17:13

## 2021-01-01 RX ADMIN — ENOXAPARIN SODIUM 80 MG: 80 INJECTION SUBCUTANEOUS at 08:10

## 2021-01-01 ASSESSMENT — ENCOUNTER SYMPTOMS
ALLERGIC/IMMUNOLOGIC NEGATIVE: 1
GASTROINTESTINAL NEGATIVE: 1
COUGH: 1
ALLERGIC/IMMUNOLOGIC NEGATIVE: 1
GASTROINTESTINAL NEGATIVE: 1
COUGH: 1

## 2021-01-01 ASSESSMENT — PAIN SCALES - WONG BAKER: WONGBAKER_NUMERICALRESPONSE: 0

## 2021-01-01 ASSESSMENT — PAIN SCALES - GENERAL
PAINLEVEL_OUTOF10: 0

## 2021-01-01 NOTE — PROGRESS NOTES
Patient Humberto Lipoma   MRN -  [id number]   [facility]   - 1935        Date of evaluation -  2020    REASON FOR THE CONSULTATION:  Worsening respiratory failure/aspiration pneumonia    HISTORY OF PRESENT ILLNESS:    Fiona Caro is a 82y.o. year old male with past medical history significant for paroxysmal atrial fibrillation on warfarin, hypertension, hypothyroidism, dementia initially presented on [facility] with shortness of breath. Patient uses 4 L NC, EMS was called from the patient's nursing facility due to increased shortness of breath. Patient was recently discharged from hospital on 2020 after treatment of pneumonia and supratherapeutic INR, he was discharged on p.o. doxycycline. On admission he had leukocytosis of 21.6  CT chest-bilateral lower lobe consolidation and scattered bilateral ground glass nodular opacities. Covid 19 -negative  Patient was initially started on nasal cannula, switched to high flow nasal cannula. Currently on high flow nasal cannula FiO2 50%, O2 25 L. Patient reports having worsening shortness of breath, has poor baseline cough. Denies any fever, chills. 2020   Subjective    still on high flow nc   Poor cough         Immunization     There is no immunization history on file for this patient. PAST MEDICAL HISTORY:       Diagnosis Date    Atherosclerosis     Benign prostatic hyperplasia     Hypertension     Hypothyroidism     Osteoporosis     Paroxysmal A-fib ([address]     Pneumonia     Rhabdomyolysis     Smoking     Spondylolisthesis     Vitamin D deficiency          Family History:   History reviewed. No pertinent family history. SURGICAL HISTORY:   Past Surgical History:   Procedure Laterality Date    ANKLE SURGERY      HIP SURGERY      TONSILLECTOMY AND ADENOIDECTOMY                 TOBACCO:   reports that he has never smoked.  He has never used smokeless tobacco.  ETOH:   reports no history of alcohol use. ALLERGIES:  No Known Allergies    Home Meds:   Prior to Admission medications    Medication Sig Start Date End Date Taking? Authorizing Provider   nystatin (MYCOSTATIN) POWD powder Apply topically 2 times daily groin   Yes Historical Provider, MD   warfarin (COUMADIN) 2 MG tablet Take 1 tablet by mouth daily 12/19 12/20 repeat INR 12/21 12/18/20  Yes VAIBHAV Lozano - CNP   digoxin (LANOXIN) 125 MCG tablet Take 125 mcg by mouth daily   Yes Historical Provider, MD   dilTIAZem (CARDIZEM LA) 120 MG TB24 extended release tablet Take 120 mg by mouth daily   Yes Historical Provider, MD   ergocalciferol (ERGOCALCIFEROL) 1.25 MG (16115 UT) capsule Take 50,000 Units by mouth once a week Saturday 11/14/20 12/25/20 Yes Historical Provider, MD   finasteride (PROSCAR) 5 MG tablet Take 5 mg by mouth daily   Yes Historical Provider, MD   oxyCODONE-acetaminophen (PERCOCET) 5-325 MG per tablet Take 1-2 tablets by mouth every 6 hours.  11/11/20  Yes Historical Provider, MD   tamsulosin (FLOMAX) 0.4 MG capsule Take 0.8 mg by mouth nightly   Yes Historical Provider, MD   levothyroxine (SYNTHROID) 100 MCG tablet Take 75 mcg by mouth daily   Yes Historical Provider, MD   collagenase 250 UNIT/GM ointment Apply 250 g topically daily 11/11/20  Yes Historical Provider, MD   acetaminophen (TYLENOL) 325 MG tablet Take 650 mg by mouth every 4 hours as needed for Pain   Yes Historical Provider, MD   Cholecalciferol (VITAMIN D3) 125 MCG (5000 UT) TABS Take by mouth daily   Yes Historical Provider, MD     CURRENT MEDS :  Scheduled Meds:   enoxaparin  1 mg/kg Subcutaneous BID    ampicillin-sulbactam  3 g Intravenous Q6H    dextromethorphan-guaiFENesin  10 mL Oral 4 times per day    warfarin (COUMADIN) daily dosing (placeholder)   Other RX Placeholder    multivitamin  1 tablet Oral Daily    midodrine  5 mg Oral TID WC    sodium chloride flush  10 mL Intravenous 2 times per day    ipratropium-albuterol  1 ampule Inhalation Q4H WA    digoxin  125 mcg Oral Daily    dilTIAZem  120 mg Oral Daily    finasteride  5 mg Oral Daily    levothyroxine  75 mcg Oral Daily    tamsulosin  0.8 mg Oral Nightly       Continuous Infusions:          REVIEW OF SYSTEMS:  CONSTITUTIONAL:  negative for  fevers, chills  RESPIRATORY:  See hpi  CARDIOVASCULAR:  negative for  chest pain, palpitations, orthopnea, PND  GASTROINTESTINAL:  negative for nausea, vomiting, change in bowel habits, diarrhea, constipation, abdominal pain, pruritus, abdominal mass and abdominal distention  NEUROLOGICAL:  negative for headaches, dizziness, seizures,       Vitals:  BP (!) 110/57   Pulse 73   Temp 98.1 °F (36.7 °C) (Oral)   Resp 16   Ht 6' 3\" (1.905 m)   Wt 181 lb (82.1 kg)   SpO2 98%   BMI 22.62 kg/m²     PHYSICAL EXAM:  General Appearance:    Alert, cooperative,on high flow    Head:    Normocephalic, without obvious abnormality, atraumatic              Nose:   Nares normal, septum midline, mucosa normal, no drainage        or sinus tenderness   Throat:   Lips, mucosa, and tongue normal;    Neck:   Supple, symmetrical, trachea midline, no adenopathy;     thyroid:  no enlargement/tenderness/nodules;    Back:     Symmetric, no curvature, ROM normal, no CVA tenderness   Lungs:       +rhonchi bilaterally   Chest Wall:    No tenderness or deformity      Heart:    Regular rate and rhythm, S1 and S2 normal, no murmur, rub        or gallop no rvh                           Abdomen:                                                 Pulses:                              Skin:                  Lymph nodes:                                      Soft, non-tender, bowel sounds active all four quadrants,     no masses, no organomegaly         2+ and symmetric all extremities     Skin color, texture, turgor normal, no rashes or lesions       Cervical, supraclavicular not enlarged or matted or tender                           CBC:   Recent Labs     12/29/20  2739 12/30/20  0531 12/31/20  0706   WBC 14.3* 12.0* 9.2   HGB 9.5* 9.6* 9.5*    286 257     BMP:    Recent Labs     12/29/20  0357 12/30/20  0531 12/31/20  0706   * 139 140   K 3.8 3.5* 3.4*   CL 95* 98 99   CO2 29 26 28   BUN 18 17 16   CREATININE 0.72 0.83 0.74   GLUCOSE 103* 96 111*     Hepatic: No results for input(s): AST, ALT, ALB, BILITOT, ALKPHOS in the last 72 hours. Amylase: No results found for: AMYLASE  Lipase: No results found for: LIPASE  Troponin: No results for input(s): TROPONINI in the last 72 hours. BNP: No results for input(s): BNP in the last 72 hours. Lipids: No results for input(s): CHOL, HDL in the last 72 hours. Invalid input(s): LDLCALCU  ABGs: No results found for: PHART, PO2ART, CFL8CPG  INR:   Recent Labs     12/29/20  0357 12/30/20  0531 12/31/20  0706   INR 1.1 1.2 1.3     Thyroid: No results found for: TSH  Urinalysis: No results for input(s): BACTERIA, BLOODU, CLARITYU, COLORU, PHUR, PROTEINU, RBCUA, SPECGRAV, BILIRUBINUR, NITRU, WBCUA, LEUKOCYTESUR, GLUCOSEU in the last 72 hours.           IMPRESSION:    Patient Active Problem List   Diagnosis Code    Elevated INR R79.1    Age-related osteoporosis with current pathological fracture M80.00XA    Atherosclerotic peripheral vascular disease (HCC) I70.209    Closed nondisplaced longitudinal fracture of right patella S82.024A    Pathological compression fracture of lumbar vertebra ([facility] [address] M48.56XA    Hypertension I10    Hypothyroidism E03.9    Benign prostatic hyperplasia N40.0    Paroxysmal atrial fibrillation ([facility]) I48.0    Vitamin D deficiency E55.9    Chronic respiratory failure with hypoxia, on home oxygen therapy ([facility]) J96.11, Z99.81    Pneumonia due to infectious organism J18.9    Coagulopathy ([address] D68.9    Community acquired bacterial pneumonia J15.9    Chronic anticoagulation Z79.01    Lactic acidosis E87.2    Sepsis ([facility]) A41.9    Acute respiratory failure with hypoxia ([facility]) J96.01    Moderate malnutrition ([facility] [address] E44.0     Assessment:       Acute hypoxic respiratory failure  Pneumonia due to aspiration - left > rt lower lobe   Dysphagia   Paroxysmal atrial fibrillation  BPH  Hypertension               PLAN:      Continue Unasyn  Encourage incentive spirometry and Acapella usage  Continue high flow nasal cannula  Supplement oxygen to keep saturation >92%  Continue bronchodilators  PT/OT  DVT prophylaxis    Electronically signed by Mitra Noble MD on 12/31/2020 at 7:26 PM

## 2021-01-01 NOTE — PROGRESS NOTES
dose of IV Zosyn and Cipro in the emergency room   Patient was transferred to [facility]. Vs for continued management of Pneumonia. Current evaluation:2021    BP (!) 109/46   Pulse 72   Temp 98.1 °F (36.7 °C) (Oral)   Resp 18   Ht 6' 3\" (1.905 m)   Wt 183 lb 12.8 oz (83.4 kg)   SpO2 92%   BMI 22.97 kg/m²     Temperature Range: Temp: 98.1 °F (36.7 °C) Temp  Av.1 °F (36.7 °C)  Min: 97.7 °F (36.5 °C)  Max: 98.3 °F (36.8 °C)  The patient is seen and evaluated at bedside he is awake and alert and does have family sitting at bedside with him. There is a sitter in the room with him as well. He is currently on 6 L of oxygen by nasal cannula. Does report a cough but no significant shortness of breath. Review of Systems   Unable to perform ROS: Dementia   Constitutional: Negative. Respiratory: Positive for cough. Cardiovascular: Negative. Gastrointestinal: Negative. Genitourinary: Negative. Musculoskeletal: Negative. Allergic/Immunologic: Negative. Neurological: Negative. Psychiatric/Behavioral: Positive for confusion. Physical Examination :     Physical Exam  Constitutional:       Appearance: He is well-developed. HENT:      Head: Normocephalic and atraumatic. Neck:      Musculoskeletal: Normal range of motion and neck supple. Cardiovascular:      Rate and Rhythm: Normal rate. Heart sounds: Normal heart sounds. No friction rub. No gallop. Pulmonary:      Effort: Pulmonary effort is normal.      Breath sounds: Rales present. No wheezing. Abdominal:      General: Bowel sounds are normal.      Palpations: Abdomen is soft. There is no mass. Tenderness: There is no abdominal tenderness. Musculoskeletal: Normal range of motion. Lymphadenopathy:      Cervical: No cervical adenopathy. Skin:     General: Skin is warm and dry.       Comments: Venous stasis dermatitis skin changes   Neurological:      Mental Status: He is alert and oriented to person, place, and time.         Laboratory data:   I have independently reviewed the followinglabs:  CBC with Differential:   Recent Labs     12/31/20  0706 01/01/21  0626   WBC 9.2 8.6   HGB 9.5* 9.1*   HCT 31.6* 31.0*    241   LYMPHOPCT 14* 21*   MONOPCT 4 4     BMP:   Recent Labs     12/31/20  0706 01/01/21  0626    138   K 3.4* 3.5*   CL 99 100   CO2 28 27   BUN 16 13   CREATININE 0.74 0.67*   MG 2.6 2.2     Hepatic Function Panel:   Recent Labs     12/31/20  0706 01/01/21  0626   LABALBU 2.1* 2.1*         Lab Results   Component Value Date    PROCAL 0.21 12/27/2020    PROCAL 0.18 12/15/2020     No results found for: CRP  No results found for: SEDRATE      Lab Results   Component Value Date    DDIMER 1.04 12/25/2020     Lab Results   Component Value Date    FERRITIN 837 12/28/2020     No results found for: LDH  No results found for: FIBRINOGEN    Results in Past 30 Days  Result Component Current Result Ref Range Previous Result Ref Range   SARS-CoV-2      (12/25/2020)       (12/14/2020)          (12/25/2020)       (12/14/2020)     Not Detected (12/25/2020) Not Detected Not Detected (12/14/2020) Not Detected     Lab Results   Component Value Date    COVID19 Not Detected 12/25/2020    COVID19 Not Detected 12/14/2020       No results for input(s): [facility] in the last 72 hours. Imaging Studies:   ONE XRAY VIEW OF THE CHEST 12/30/2020 12:37 pm  Impression   Persistent left lower lobe opacification concerning for pneumonia.  Interval   improvement of right basilar infiltrate.  Left perihilar atelectasis.           Cultures:     Culture, Blood 1 [[id number]] Collected: 12/25/20 1130   Order Status: Completed Specimen: Blood Updated: 12/31/20 0023    Specimen Description . BLOOD    Special Requests 20 ML RIGHT ARM    Culture NO GROWTH 6 DAYS   Culture, Blood 1 [[id number]] Collected: 12/25/20 1355   Order Status: Completed Specimen: Blood Updated: 12/31/20 0023    Specimen Description . BLOOD    Special Requests left this patient. Please call with questions. This note iscreated with the assistance of a speech recognition program.  While intending to generate a document that actually reflects the content of the visit, the document can still have some errors including those of syntax andsound a like substitutions which may escape proof reading. In such instances, actual meaning can be extrapolated by contextual diversion.

## 2021-01-01 NOTE — PROGRESS NOTES
[facility]  Office: [address], DO, Tank Nurse, DO, Carmen Show, DO, Natanael Newton Blood, DO, Ronald Salinas MD, Noel Habermann, MD, Emelyn Bravo MD, Natasha Santos MD, Charito Calderon MD, Lovenia Soulier, MD, Escobar Carrero MD, Mare Thornton MD, Leandra Huggins MD, Kenan Wilks DO, Governor Torey MD, Marianne Montes MD, Magan Garcia DO, Km Olea MD,  Lisbeth Gamboa DO, Derik Melendrez MD, Kennedy Fisher MD, Ant Davis CNP, [facility] Louann, CNP, Roger Sebastian, CNP, Abel Jean, CNS, Sony Salcedo, CNP, Candi Herrmann, CNP, Britni Mejía, CNP, Kevin Ferrer, CNP, Allan Tabor, CNP, Mauricio Desai PA-C, [facility], Omar Ibanez, CNP, Jackie Hernandez, CNP, Yoselin Camacho, CNP, Tico Hernandez, CNP, Julianna Carbone, [address] [place] [address]    Progress Note    1/1/2021    10:31 AM    Name:   Asher Bae  MRN:     [id number]     Acct:      [de-identified]   Room:   [facility] [place] [address] Day:  7  Admit Date:  12/25/2020 11:29 AM    PCP:   Nellie Davenport MD  Code Status:  Full Code    Subjective:     C/C:   Chief Complaint   Patient presents with    Respiratory Distress      Interval History Status: improved. Pt seen and examined this morning  No complaints at this time        Review of Systems:     12 point ROS performed today and negative for anything other than what was stated in subjective     Medications:      Allergies:  No Known Allergies    Current Meds:   Scheduled Meds:    neomycin-bacitracin-polymyxin   Topical BID    enoxaparin  1 mg/kg Subcutaneous BID    ampicillin-sulbactam  3 g Intravenous Q6H    dextromethorphan-guaiFENesin  10 mL Oral 4 times per day    warfarin (COUMADIN) daily dosing (placeholder)   Other RX Placeholder    multivitamin  1 tablet Oral Daily    midodrine  5 mg Oral TID WC    sodium chloride flush  10 mL Intravenous 2 times per day    ipratropium-albuterol  1 ampule Inhalation Q4H WA    digoxin  125 mcg Oral Daily    dilTIAZem  120 mg Oral Daily    finasteride  5 mg Oral Daily    levothyroxine  75 mcg Oral Daily    tamsulosin  0.8 mg Oral Nightly     Continuous Infusions:   PRN Meds: potassium chloride **OR** potassium alternative oral replacement **OR** potassium chloride, nicotine, promethazine **OR** ondansetron, magnesium hydroxide, acetaminophen **OR** acetaminophen, albuterol    Data:     Past Medical History:   has a past medical history of Atherosclerosis, Benign prostatic hyperplasia, Hypertension, Hypothyroidism, Osteoporosis, Paroxysmal A-fib ([address] Pneumonia, Rhabdomyolysis, Smoking, Spondylolisthesis, and Vitamin D deficiency. Social History:   reports that he has never smoked. He has never used smokeless tobacco. He reports that he does not drink alcohol or use drugs. Family History: History reviewed. No pertinent family history. Vitals:  BP (!) 112/58   Pulse 77   Temp 98.2 °F (36.8 °C) (Oral)   Resp 22   Ht 6' 3\" (1.905 m)   Wt 183 lb 12.8 oz (83.4 kg)   SpO2 99%   BMI 22.97 kg/m²   Temp (24hrs), Av °F (36.7 °C), Min:97.7 °F (36.5 °C), Max:98.3 °F (36.8 °C)    Recent Labs     20  1253 20  1727   POCGLU 98 126*       I/O (24Hr):     Intake/Output Summary (Last 24 hours) at 2021 1031  Last data filed at 2021 0629  Gross per 24 hour   Intake --   Output 610 ml   Net -610 ml       Labs:  Hematology:  Recent Labs     20  0531 20  0706 21  0626   WBC 12.0* 9.2 8.6   RBC 3.31* 3.33* 3.18*   HGB 9.6* 9.5* 9.1*   HCT 31.4* 31.6* 31.0*   MCV 94.9 94.9 97.5   MCH 29.0 28.5 28.6   MCHC 30.6 30.1 29.4   RDW 19.5* 19.2* 19.6*    257 241   MPV 10.6 10.1 10.0   INR 1.2 1.3 1.5     Chemistry:  Recent Labs     20  0531 20  0531 20  0706 20  0706 20  1637 20  2214 21  0333 21  0626     --  140  --   --   --   --  138   K 3.5*  --  3.4*  --   --   --   -- 3.5*   CL 98  --  99  --   --   --   --  100   CO2 26  --  28  --   --   --   --  27   GLUCOSE 96  --  111*  --   --   --   --  90   BUN 17  --  16  --   --   --   --  13   CREATININE 0.83  --  0.74  --   --   --   --  0.67*   MG 2.3  --  2.6  --   --   --   --  2.2   ANIONGAP 15  --  13  --   --   --   --  11   LABGLOM >60  --  >60  --   --   --   --  >60   GFRAA >60  --  >60  --   --   --   --  >60   CALCIUM 8.8  --  9.0  --   --   --   --  8.7   PHOS 2.8  --  2.8  --   --   --   --  2.5   PROBNP 615*  --   --   --   --   --   --   --    TROPHS 45*   < > 39*   < > 39* 36* 37*  --     < > = values in this interval not displayed. Recent Labs     12/29/20  1253 12/29/20  1727 12/30/20  0531 12/31/20  0706 01/01/21  0626   LABALBU  --   --  2.0* 2.1* 2.1*   POCGLU 98 126*  --   --   --      ABG:No results found for: POCPH, PHART, PH, POCPCO2, HPS8QPI, PCO2, POCPO2, PO2ART, PO2, POCHCO3, RVL5DAI, HCO3, NBEA, PBEA, BEART, BE, THGBART, THB, GIY2HLS, UCTZ9OPX, W6ZZVKBD, O2SAT, FIO2  Lab Results   Component Value Date/Time    SPECIAL NOT REPORTED 12/26/2020 05:12 PM     Lab Results   Component Value Date/Time    CULTURE PRESUMPTIVE CANDIDA ALBICANS 10 to 50,000 CFU/ML (A) 12/26/2020 05:12 PM       Radiology:  Xr Chest (single View Frontal)    Result Date: 12/30/2020  Persistent left lower lobe opacification concerning for pneumonia. Interval improvement of right basilar infiltrate. Left perihilar atelectasis. Xr Chest (2 Vw)    Result Date: 12/26/2020  Similar findings to those seen on recent CT, compatible with pneumonia and some basilar atelectasis/consolidation. Xr Chest Portable    Result Date: 12/27/2020  Persistent findings of aspiration or pneumonia near the bases, appearing worsened in the right base and decreased in the lingula. Xr Chest Portable    Result Date: 12/25/2020  Subtle patchy bilateral lung opacities increased from the prior study are likely on the basis of pneumonia.      Ct Chest Pulmonary Embolism W Contrast    Result Date: 12/25/2020  No central or segmental pulmonary embolus. Bilateral lower lobe consolidation and scattered bilateral ground-glass nodular opacities are likely on the basis of pneumonia. Recommend follow-up. Physical Examination:        General appearance: Chronically ill-appearing, NAD  Mental Status: alert and awake, oriented x2  Lungs:  Course to auscultation the lower lobes bilaterally with diminished breath sounds at the bases    Heart:  regular rate and rhythm, no murmur  Abdomen:  soft, nontender, nondistended, normal bowel sounds, no masses, hepatomegaly, splenomegaly +sacral ulcer  Extremities:  no edema, redness, tenderness in the calves  Skin:  no gross lesions, rashes, induration    Assessment:        Hospital Problems           Last Modified POA    * (Principal) Community acquired bacterial pneumonia 12/26/2020 Yes    Hypertension 12/25/2020 Yes    Hypothyroidism 12/25/2020 Yes    Benign prostatic hyperplasia 12/25/2020 Yes    Paroxysmal atrial fibrillation ([address] 12/25/2020 Yes    Chronic anticoagulation 12/25/2020 Yes    Lactic acidosis 12/26/2020 Yes    Sepsis ([address] 12/26/2020 Yes    Acute respiratory failure with hypoxia ([address] 12/27/2020 Yes    Moderate malnutrition ([address] 12/31/2020 Yes          Plan:        1. Acute hypoxic respiratory failure   - 2/2 pneumonia, atelectasis,volume overload and aspiration   - Cultures remain negative.   - continue to wean high flow oxygen via nasal cannula   - pt now on nasal cannula 6 L and doing well   - antibiotic regimen changed to unasyn 3g IV Q6Hr (day 3). Continue until 1/5/21 per ID recommendations   - ID and pulmonary on board   - Speech consulted, aspiration precautions.  Recommend dental soft diet with nectar   - Continue incentive spirometry, Duo neb's.   - chest xray done on 12/27 showed persistent findings of aspiration or pneumonia near the bases appearing worsened in the right base   - chest xray done 12/30 showed persistent LLL opacification concerning for pneumonia with interval improvement of right basilar infiltrate   - legionella negative  - strep pneumo negative   - COVID negative      2. Acute Hypokalemia:   - replace     3. Abnormal UA:   - Candiduria likely colonization.      4. Paroxysmal atrial fibrillation:   - Continue Cardizem and Coumadin   - telemetry   - INR currently 1.5. Lovenox for bridging      5. Hypothyroidism:   - Continue Synthroid 75 mcg daily     6. BPH:   - Continue Flomax   - Bladder scan q shift, straight cath for urinary retention greater than 250 cc     7. NCNC anemia:  - stable   - currently stable at 9.1  - continue to monitor      8.  DISPO:   - Pending clinical improvement   - PT/OT consulted     Jadiel Obregon MD  1/1/2021  10:31 AM

## 2021-01-01 NOTE — PROGRESS NOTES
Pharmacy Note  Warfarin Consult follow-up      Recent Labs     01/01/21  0626   INR 1.5     Recent Labs     12/30/20  0531 12/31/20  0706 01/01/21  0626   HGB 9.6* 9.5* 9.1*   HCT 31.4* 31.6* 31.0*    257 241       Significant Drug-Drug Interactions: APAP, unasyn, enoxaparin, levothyroxine      Notes: Will give 4mg warfarin today 1/1/21                  Daily PT/INR while inpatient.       Rebeca Mckeon, WilmanD

## 2021-01-01 NOTE — PROGRESS NOTES
PULMONARY & CRITICAL CARE MEDICINE PROGRESS  NOTE     Patient:  Fiona Caro  MRN: [id number]  Admit date: 2020  Primary Care Physician: Quan Amador MD  Consulting Physician: Lou Paulino MD  CODE Status: Full Code  LOS: 7    SUBJECTIVE     I personally interviewed/examined the patient, reviewed interval history and interpreted all available radiographic, laboratory data at the time of service. Chief Compliant/Reason for Initial Consult: Pneumonia    Brief Hospital Course: The patient is a 82 y.o. male with multiple medical comorbidities including hypertension, hypothyroidism, atrial fibrillation, dementia resented to [facility] ER with worsening shortness of breath. He was hypoxemic on presentation. He was recently treated for pneumonia and supratherapeutic INR and was discharged on oral doxycycline. Chest x-ray showed worsening pulmonary infiltrates patient had leukocytosis. CT chest was negative for PE. His Covid test was negative. He was on cefepime, which has been changed to Unasyn.     Interval History:  21  Patient is currently on high flow nasal cannula O2 Flow Rate (L/min)  Av.3 L/min  Min: 6 L/min  Max: 25 L/min  T-max is 98.3, white count is 8.6 today  Patient denies any new complaints  No overnight issues reported    Review of Systems:  Review of Systems   Unable to perform ROS: Dementia     OBJECTIVE     VITAL SIGNS:   LAST-  BP (!) 112/58   Pulse 77   Temp 98.2 °F (36.8 °C) (Oral)   Resp 20   Ht 6' 3\" (1.905 m)   Wt 183 lb 12.8 oz (83.4 kg)   SpO2 99%   BMI 22.97 kg/m²   8-24 HR RANGE-  TEMP Temp  Av.1 °F (36.7 °C)  Min: 97.7 °F (36.5 °C)  Max: 98.3 °F (62.1 °C)   BP Systolic (19IRV), REO:984 , Min:89 , TAR:904      Diastolic (64JCE), BMU:82, Min:39, Max:60     PULSE Pulse  Av.8  Min: 71  Max: 77   RR Resp  Av  Min: 20  Max: 22   O2 SAT SpO2  Av.8 %  Min: 96 %  Max: 99 %   OXYGEN DELIVERY O2 Flow Rate (L/min)  Av.3 L/min  Min: 6 L/min  Max: 25 L/min     Systemic Examination:   General appearance - looks comfortable and in no acute distress  Eyes - pupils equal and reactive, extraocular eye movements intact  Mouth - mucous membranes moist, pharynx normal without lesions  Neck - supple, no significant adenopathy  Chest -breath sounds diminished at bases, occasional crackles  Heart - normal rate, regular rhythm, normal S1, S2, no murmurs, rubs, clicks or gallops  Abdomen - soft, nontender, nondistended, no masses or organomegaly  Neurological -awake and alert, slightly confused, no focal findings or movement disorder noted  Extremities - peripheral pulses normal, no pedal edema, no clubbing or cyanosis  Skin - normal coloration and turgor, no rashes, no suspicious skin lesions noted     DATA REVIEW     Medications:  Scheduled Meds:   neomycin-bacitracin-polymyxin   Topical BID    warfarin  4 mg Oral Once    enoxaparin  1 mg/kg Subcutaneous BID    ampicillin-sulbactam  3 g Intravenous Q6H    dextromethorphan-guaiFENesin  10 mL Oral 4 times per day    warfarin (COUMADIN) daily dosing (placeholder)   Other RX Placeholder    multivitamin  1 tablet Oral Daily    midodrine  5 mg Oral TID WC    sodium chloride flush  10 mL Intravenous 2 times per day    ipratropium-albuterol  1 ampule Inhalation Q4H WA    digoxin  125 mcg Oral Daily    dilTIAZem  120 mg Oral Daily    finasteride  5 mg Oral Daily    levothyroxine  75 mcg Oral Daily    tamsulosin  0.8 mg Oral Nightly     Continuous Infusions:    LABS:-  ABG:   No results for input(s): POCPH, POCPCO2, POCPO2, POCHCO3, XJMB4EGU in the last 72 hours.   CBC:   Recent Labs     20  0531 20  0706 21  0626   WBC 12.0* 9.2 8.6   HGB 9.6* 9.5* 9.1*   HCT 31.4* 31.6* 31.0*   MCV 94.9 94.9 97.5    257 241   LYMPHOPCT 11* 14* 21*   RBC 3.31* 3.33* 3.18*   MCH 29.0 28.5 28.6   MCHC 30.6 30.1 29.4   RDW 19.5* 19.2* 19.6* BMP:   Recent Labs     12/30/20  0531 12/31/20  0706 01/01/21  0626    140 138   K 3.5* 3.4* 3.5*   CL 98 99 100   CO2 26 28 27   BUN 17 16 13   CREATININE 0.83 0.74 0.67*   GLUCOSE 96 111* 90   PHOS 2.8 2.8 2.5     Liver Function Test:   Recent Labs     01/01/21  0626   LABALBU 2.1*     Amylase/Lipase:  No results for input(s): AMYLASE, LIPASE in the last 72 hours. Coagulation Profile:   Recent Labs     12/30/20  0531 12/31/20  0706 01/01/21  0626   INR 1.2 1.3 1.5   PROTIME 12.4* 13.5* 15.8*     Cardiac Enzymes:  No results for input(s): CKTOTAL, CKMB, CKMBINDEX, TROPONINI in the last 72 hours. Lactic Acid:  No results found for: LACTA  BNP:   No results found for: BNP  D-Dimer:  Lab Results   Component Value Date    DDIMER 1.04 12/25/2020     Others:   No results found for: TSH, F6CMGQV, A0NMPRY, THYROIDAB, FT3, T4FREE  No results found for: BC, RHEUMFACTOR, SEDRATE, CRP  No results found for: LABURIC, URICACID  Lab Results   Component Value Date    IRON 25 (L) 12/28/2020    TIBC 111 (L) 12/28/2020    FERRITIN 837 (H) 12/28/2020     No results found for: SPEP, UPEP  No results found for: PSA, CEA, , [id number],     Input/Output:    Intake/Output Summary (Last 24 hours) at 1/1/2021 1438  Last data filed at 1/1/2021 0629  Gross per 24 hour   Intake --   Output 510 ml   Net -510 ml       Microbiology:  No results for input(s): SPECDESC, SPECDESC, SPECIAL, CULTURE, CULTURE, STATUS, ORG, CDIFFTOXPCR, CAMPYLOBPCR, SALMONELLAPC, SHIGAPCR, SHIGELLAPCR, MPNEUG, MPNEUM, LACTOQL in the last 72 hours. Pathology:    Radiology reports:  XR CHEST (SINGLE VIEW FRONTAL)   Final Result   Persistent left lower lobe opacification concerning for pneumonia. Interval   improvement of right basilar infiltrate. Left perihilar atelectasis. XR CHEST PORTABLE   Final Result   Persistent findings of aspiration or pneumonia near the bases, appearing   worsened in the right base and decreased in the lingula. XR CHEST (2 VW)   Final Result   Similar findings to those seen on recent CT, compatible with pneumonia and   some basilar atelectasis/consolidation. CT CHEST PULMONARY EMBOLISM W CONTRAST   Final Result   No central or segmental pulmonary embolus. Bilateral lower lobe consolidation and scattered bilateral ground-glass   nodular opacities are likely on the basis of pneumonia. Recommend follow-up. XR CHEST PORTABLE   Final Result   Subtle patchy bilateral lung opacities increased from the prior study are   likely on the basis of pneumonia. Echocardiogram:   No results found for this or any previous visit. Cardiac Catheterization:   No results found for this or any previous visit. ASSESSMENT AND PLAN     Assessment:    // Acute hypoxemic respiratory failure, requiring high flow nasal cannula  // Bilateral multifocal pneumonia  // Paroxysmal atrial fibrillation  // Dementia  // Essential hypertension  // BPH  // Hypothyroidism    Plan:     CXR/CT Chest reviewed   Patient remains hemodynamically stable and is currently saturating well on high flow nasal cannula   Wean FiO2/flow rate as tolerated   Continue supplemental oxygen to keep oxygen saturation greater than 92%   Anticoagulation, as per primary service   Encourage incentive spirometry   Continue pulmonary toilet, aspiration precautions and bronchodilators   Continue to monitor I/O with a goal of even/negative fluid balance   Antimicrobials reviewed; continue Unasyn   DVT and stress ulcer prophylaxis   Physical/occupational therapy; increase activity as tolerated    It was my pleasure to evaluate Terrell Good today. We will continue to follow. I would like to thank you for allowing me to participate in the care of this patient. Please feel free to call with any further questions or concerns. Pio Ayon M.D.    Pulmonary and Critical Care Medicine           1/1/2021, 2:38 PM    Please note that this chart was generated using voice recognition Dragon dictation software. Although every effort was made to ensure the accuracy of this automated transcription, some errors in transcription may have occurred.

## 2021-01-02 NOTE — PROGRESS NOTES
Pharmacy Note  Warfarin Consult follow-up      Recent Labs     01/02/21  0510   INR 1.7     Recent Labs     12/31/20  0706 01/01/21  0626 01/02/21  0510   HGB 9.5* 9.1* 9.1*   HCT 31.6* 31.0* 30.1*    241 220       Current warfarin drug-drug interactions: acetaminophen, Unasyn, enoxaparin, levothyroxine      Notes: Will give 4mg warfarin today 1/2/21. Daily PT/INR while inpatient.       Danii Trejo, PharmD

## 2021-01-02 NOTE — PROGRESS NOTES
[facility]  Progress Note    Kaylee Amador  MRN: [id number]  Date: 1/2/2021  LOS: 8     Reason for F/U :   Pneumonia    Impression :   1. Aspiration pneumonia  · Chest x-ray 12/30/2020 with persistent left lower lobe infiltrate  2. Atrial fibrillation on chronic anticoagulation  3. Dementia  4. Coagulase-negative staph in 1 blood culture 12/15/2020-a contaminant    Recommendations:   · On Unasyn through 1/5/2021  · The patient is doing well clinically. · He continues to wean down to 4 L of oxygen by nasal cannula. · No significant cough or shortness of breath on exam    Infection Control Recommendations:   Universal precautions    Discharge Planning:   Estimated Length of IV antimicrobials: 1/5/2021  Patient will need Midline Catheter Insertion/ PICC line Insertion: No  Patient will need: Home IV , [place],  SNF,  LTAC: Undetermined  Patient willneed outpatient wound care: No    Medical Decision making / Summary of Stay:   Kaylee Amador is a 82y.o.-year-old white male who was initially admitted on 12/25/2020. Patient seen at the request of Dr. Saadia Irwin DO.     INITIAL HISTORY:     This is a patient with AFib (chronically anticoagulated with warfarin), HTN, BPH, hypothyroidism, dementia who presents from \[facility]\"" ER with shortness of breath.     Patient is a poor historian, thus most information is collected from EMR. EMS was called from patient's nursing facility because of increased shortness of breath. Patient uses 4 L oxygen at home, Candido Emily was saturating  85% on 4L.      Patient was recently discharged from hospital on 12/18/2020 after treatment for pneumonia and supra therapeutic INR.  Patient was discharged on PO doxycycline.  CXR done at outlying hospital read as worsening pneumonia.    WBC elevated at 21.6>20.6, lactic acid 3.5,Trop: 32>30>29, pro-bnp: 488 D-Dimer: 1.04.    CT Chest negative for PE and COVID-19 swab negative.    Patient was given 1 dose of IV Zosyn and Cipro in the emergency room   Patient was transferred to [address] [place] [address] for continued management of Pneumonia. Current evaluation:2021    BP (!) 85/57   Pulse 80   Temp 98.4 °F (36.9 °C)   Resp 18   Ht 6' 3\" (1.905 m)   Wt 183 lb 12.8 oz (83.4 kg)   SpO2 96%   BMI 22.97 kg/m²     Temperature Range: Temp: 98.4 °F (36.9 °C) Temp  Av.3 °F (36.8 °C)  Min: 98 °F (36.7 °C)  Max: 98.5 °F (36.9 °C)  The patient is seen and evaluated at bedside he is awake and alert in no acute distress. He is slightly hard of hearing. He has not had any significant cough or shortness of breath. He does not have any complaints at the time of my evaluation. There is a sitter in the room with him    Review of Systems   Constitutional: Negative. Respiratory: Positive for cough. Cardiovascular: Negative. Gastrointestinal: Negative. Genitourinary: Negative. Musculoskeletal: Negative. Allergic/Immunologic: Negative. Neurological: Negative. Physical Examination :     Physical Exam  Constitutional:       Appearance: He is well-developed. HENT:      Head: Normocephalic and atraumatic. Neck:      Musculoskeletal: Normal range of motion and neck supple. Cardiovascular:      Rate and Rhythm: Normal rate. Heart sounds: Normal heart sounds. No friction rub. No gallop. Pulmonary:      Effort: Pulmonary effort is normal.      Breath sounds: Rales present. No wheezing. Abdominal:      General: Bowel sounds are normal.      Palpations: Abdomen is soft. There is no mass. Tenderness: There is no abdominal tenderness. Musculoskeletal: Normal range of motion. Lymphadenopathy:      Cervical: No cervical adenopathy. Skin:     General: Skin is warm and dry. Comments: Venous stasis dermatitis skin changes   Neurological:      Mental Status: He is alert and oriented to person, place, and time.          Laboratory data:   I have independently reviewed the followinglabs:  CBC with Differential: Recent Labs     01/01/21  0626 01/02/21  0510   WBC 8.6 7.2   HGB 9.1* 9.1*   HCT 31.0* 30.1*    220   LYMPHOPCT 21* 25   MONOPCT 4 4     BMP:   Recent Labs     01/01/21  0626 01/02/21  0510    139   K 3.5* 3.6*    100   CO2 27 27   BUN 13 12   CREATININE 0.67* 0.70   MG 2.2 2.2     Hepatic Function Panel:   Recent Labs     01/01/21  0626 01/02/21  0510   LABALBU 2.1* 2.2*         Lab Results   Component Value Date    PROCAL 0.21 12/27/2020    PROCAL 0.18 12/15/2020     No results found for: CRP  No results found for: SEDRATE      Lab Results   Component Value Date    DDIMER 1.04 12/25/2020     Lab Results   Component Value Date    FERRITIN 837 12/28/2020     No results found for: LDH  No results found for: FIBRINOGEN    Results in Past 30 Days  Result Component Current Result Ref Range Previous Result Ref Range   SARS-CoV-2      (12/25/2020)       (12/14/2020)          (12/25/2020)       (12/14/2020)     Not Detected (12/25/2020) Not Detected Not Detected (12/14/2020) Not Detected     Lab Results   Component Value Date    COVID19 Not Detected 12/25/2020    COVID19 Not Detected 12/14/2020       No results for input(s): [facility] in the last 72 hours. Imaging Studies:   ONE XRAY VIEW OF THE CHEST 12/30/2020 12:37 pm  Impression   Persistent left lower lobe opacification concerning for pneumonia.  Interval   improvement of right basilar infiltrate.  Left perihilar atelectasis.           Cultures:     Culture, Blood 1 [[id number]] Collected: 12/25/20 1130   Order Status: Completed Specimen: Blood Updated: 12/31/20 0023    Specimen Description . BLOOD    Special Requests 20 ML RIGHT ARM    Culture NO GROWTH 6 DAYS   Culture, Blood 1 [[id number]] Collected: 12/25/20 1355   Order Status: Completed Specimen: Blood Updated: 12/31/20 0023    Specimen Description . BLOOD    Special Requests left arm 20cc    Culture NO GROWTH 6 DAYS   Culture, Urine [[id number]] (Abnormal) Collected: 12/26/20 1712 Order Status: Completed Specimen: Urine Updated: 12/27/20 1306    Specimen Description . URINE    Special Requests NOT REPORTED    Culture PRESUMPTIVE CANDIDA ALBICANS 10 to 50,000 CFU/MLAbnormal    Strep Pneumoniae Antigen [[id number]] Collected: 12/26/20 0756   Order Status: Completed Specimen: Urine voided Updated: 12/27/20 0231    Source . CLEAN CATCH URINE    Strep pneumo Ag NEGATIVE    Comment: Strep pneumoniae antigen not detected        LEGIONELLA ANTIGEN, URINE [[id number]] Collected: 12/26/20 1712   Order Status: Completed Specimen: Urine voided Updated: 12/26/20 2114    Legionella Pneumophilia Ag, Urine NEGATIVE    Comment: L. pneumophila serogroup 1 antigen not detected. A negative result does not exclude infection with Leginella pnemophila serogroup 1 nor does   it rule out other microbial-caused respiratory infections of disease caused by other   serogroups of Legionella pneumophila. Medications:      neomycin-bacitracin-polymyxin   Topical BID    enoxaparin  1 mg/kg Subcutaneous BID    ampicillin-sulbactam  3 g Intravenous Q6H    dextromethorphan-guaiFENesin  10 mL Oral 4 times per day    warfarin (COUMADIN) daily dosing (placeholder)   Other RX Placeholder    multivitamin  1 tablet Oral Daily    midodrine  5 mg Oral TID WC    sodium chloride flush  10 mL Intravenous 2 times per day    ipratropium-albuterol  1 ampule Inhalation Q4H WA    digoxin  125 mcg Oral Daily    dilTIAZem  120 mg Oral Daily    finasteride  5 mg Oral Daily    levothyroxine  75 mcg Oral Daily    tamsulosin  0.8 mg Oral Nightly           [facility]  [address]  Perfect Serve messaging  OFFICE: [phone]      Electronically signed by [address], [place] on 1/2/2021 at 6:41 PM  Thank you for allowing us to participate in the care of this patient. Please call with questions.     This note iscreated with the assistance of a speech recognition program.  While intending to generate a document that actually reflects the content of the visit, the document can still have some errors including those of syntax andsound a like substitutions which may escape proof reading. In such instances, actual meaning can be extrapolated by contextual diversion.

## 2021-01-02 NOTE — PROGRESS NOTES
[facility]  Office: [address], DO, Iris Draper, DO, Fitz Madrigal, DO, Zo Benson Blood, DO, Zeina Doherty MD, Cecily Schlatter, MD, Karina Tejeda MD, Dayron Graf MD, Keke Long MD, José Antonio Gant MD, Ramila Thompson MD, Cody Upton MD, Leandra Guzman MD, Kamilla Barba DO, Dariela Guerra MD, Noam Andrew MD, Ayaan Wilburn, DO, Mitzi Rutledge MD,  Gisselle Marinelli DO, Mayco Henry MD, Prakash Castro MD, Viviana Pinto, Adonis Ward, CNP, Rohan Rao, CNP, Harry Day, [facility], Jacinto Steele, [facility], Fidencio Haley, CNP, Thai Dickinson, CNP, José Miguel Lund, CNP, Twila Rivers, CNP, Mitch Jacome PA-C, Kristopher Pierre, [facility], Kristina Childs, CNP, Adolph Ly, CNP, Vicki Melendez, CNP, Marcos Nguyen, CNP, Libertad Bruce, [address] [place] [address]    Progress Note    1/2/2021    7:58 AM    Name:   Kyung Tyler  MRN:     [id number]     [place]:      [de-identified]   Room:   [facility] Day:  8  Admit Date:  12/25/2020 11:29 AM    PCP:   Navdeep Eller MD  Code Status:  Full Code    Subjective:     C/C:   Chief Complaint   Patient presents with    Respiratory Distress      Interval History Status: improved. Pt seen and examined this morning  Continues to improve   Denies any complaints     Review of Systems:     12 point ROS performed today and negative for anything other than what was stated in subjective     Medications:      Allergies:  No Known Allergies    Current Meds:   Scheduled Meds:    neomycin-bacitracin-polymyxin   Topical BID    enoxaparin  1 mg/kg Subcutaneous BID    ampicillin-sulbactam  3 g Intravenous Q6H    dextromethorphan-guaiFENesin  10 mL Oral 4 times per day    warfarin (COUMADIN) daily dosing (placeholder)   Other RX Placeholder    multivitamin  1 tablet Oral Daily    midodrine  5 mg Oral TID WC    sodium chloride flush  10 mL Intravenous 2 times per day    ipratropium-albuterol  1 ampule Inhalation Q4H WA    digoxin  125 mcg Oral Daily    dilTIAZem  120 mg Oral Daily    finasteride  5 mg Oral Daily    levothyroxine  75 mcg Oral Daily    tamsulosin  0.8 mg Oral Nightly     Continuous Infusions:   PRN Meds: potassium chloride **OR** potassium alternative oral replacement **OR** potassium chloride, nicotine, promethazine **OR** ondansetron, magnesium hydroxide, acetaminophen **OR** acetaminophen, albuterol    Data:     Past Medical History:   has a past medical history of Atherosclerosis, Benign prostatic hyperplasia, Hypertension, Hypothyroidism, Osteoporosis, Paroxysmal A-fib ([address] Pneumonia, Rhabdomyolysis, Smoking, Spondylolisthesis, and Vitamin D deficiency. Social History:   reports that he has never smoked. He has never used smokeless tobacco. He reports that he does not drink alcohol or use drugs. Family History: History reviewed. No pertinent family history. Vitals:  BP (!) 113/56   Pulse 80   Temp 98 °F (36.7 °C) (Oral)   Resp 15   Ht 6' 3\" (1.905 m)   Wt 183 lb 12.8 oz (83.4 kg)   SpO2 98%   BMI 22.97 kg/m²   Temp (24hrs), Av.2 °F (36.8 °C), Min:98 °F (36.7 °C), Max:98.5 °F (36.9 °C)    No results for input(s): POCGLU in the last 72 hours. I/O (24Hr):     Intake/Output Summary (Last 24 hours) at 2021 0758  Last data filed at 2021 2048  Gross per 24 hour   Intake --   Output 100 ml   Net -100 ml       Labs:  Hematology:  Recent Labs     20  0706 21  0626 21  0510   WBC 9.2 8.6 7.2   RBC 3.33* 3.18* 3.15*   HGB 9.5* 9.1* 9.1*   HCT 31.6* 31.0* 30.1*   MCV 94.9 97.5 95.6   MCH 28.5 28.6 28.9   MCHC 30.1 29.4 30.2   RDW 19.2* 19.6* 19.5*    241 220   MPV 10.1 10.0 9.9   INR 1.3 1.5 1.7     Chemistry:  Recent Labs     20  0706 20  0706 20  1637 20  2214 21  0333 21  0626 21  0510     --   --   --   --  138 139   K 3.4*  --   --   --   --  3.5* 3.6*   CL 99  --   --   --   --  100 100   CO2 28  --   --   --   --  27 27   GLUCOSE 111*  --   --   --   --  90 89   BUN 16  --   --   --   --  13 12   CREATININE 0.74  --   --   --   --  0.67* 0.70   MG 2.6  --   --   --   --  2.2 2.2   ANIONGAP 13  --   --   --   --  11 12   LABGLOM >60  --   --   --   --  >60 >60   GFRAA >60  --   --   --   --  >60 >60   CALCIUM 9.0  --   --   --   --  8.7 8.4*   PHOS 2.8  --   --   --   --  2.5 2.8   TROPHS 39*   < > 39* 36* 37*  --   --     < > = values in this interval not displayed. Recent Labs     12/31/20  0706 01/01/21  0626 01/02/21  0510   LABALBU 2.1* 2.1* 2.2*     ABG:No results found for: POCPH, PHART, PH, POCPCO2, ZZQ1XHX, PCO2, POCPO2, PO2ART, PO2, POCHCO3, [address], HCO3, NBEA, PBEA, BEART, BE, THGBART, THB, ZKJ4ANG, KSZW7KSU, S7YYGSTU, O2SAT, FIO2  Lab Results   Component Value Date/Time    SPECIAL NOT REPORTED 12/26/2020 05:12 PM     Lab Results   Component Value Date/Time    CULTURE PRESUMPTIVE CANDIDA ALBICANS 10 to 50,000 CFU/ML (A) 12/26/2020 05:12 PM       Radiology:  Xr Chest (single View Frontal)    Result Date: 12/30/2020  Persistent left lower lobe opacification concerning for pneumonia. Interval improvement of right basilar infiltrate. Left perihilar atelectasis. Xr Chest (2 Vw)    Result Date: 12/26/2020  Similar findings to those seen on recent CT, compatible with pneumonia and some basilar atelectasis/consolidation. Xr Chest Portable    Result Date: 12/27/2020  Persistent findings of aspiration or pneumonia near the bases, appearing worsened in the right base and decreased in the lingula.        Physical Examination:        General appearance: Chronically ill-appearing, NAD  Mental Status: alert and awake, oriented x2  Lungs:  Course to auscultation the lower lobes bilaterally with diminished breath sounds at the bases    Heart:  regular rate and rhythm, no murmur  Abdomen:  soft, nontender, nondistended, normal bowel sounds, no masses, hepatomegaly, splenomegaly +sacral

## 2021-01-03 NOTE — PROGRESS NOTES
PULMONARY & CRITICAL CARE MEDICINE PROGRESS  NOTE     Patient:  Heena Castro  MRN: [id number]  Admit date: 2020  Primary Care Physician: Sofy Toribio MD  Consulting Physician: Tamar Escamilla MD  CODE Status: Full Code  LOS: 8    SUBJECTIVE     I personally interviewed/examined the patient, reviewed interval history and interpreted all available radiographic, laboratory data at the time of service. Chief Compliant/Reason for Initial Consult: Pneumonia    Brief Hospital Course: The patient is a 82 y.o. male with multiple medical comorbidities including hypertension, hypothyroidism, atrial fibrillation, dementia resented to [facility] ER with worsening shortness of breath. He was hypoxemic on presentation. He was recently treated for pneumonia and supratherapeutic INR and was discharged on oral doxycycline. Chest x-ray showed worsening pulmonary infiltrates patient had leukocytosis. CT chest was negative for PE. His Covid test was negative. He was on cefepime, which has been changed to Unasyn.     Interval History:  21  Patient is currently on nasal cannula O2 Flow Rate (L/min)  Av L/min  Min: 4 L/min  Max: 4 L/min  T-max is 98.4, white count is 7.2 today  He is currently on Unasyn  Patient denies any new complaints  No overnight issues reported    Review of Systems:  Review of Systems   Unable to perform ROS: Dementia     OBJECTIVE     VITAL SIGNS:   LAST-  /60   Pulse 83   Temp 98.2 °F (36.8 °C)   Resp 18   Ht 6' 3\" (1.905 m)   Wt 183 lb 12.8 oz (83.4 kg)   SpO2 96%   BMI 22.97 kg/m²   8-24 HR RANGE-  TEMP Temp  Av.2 °F (36.8 °C)  Min: 98 °F (36.7 °C)  Max: 98.4 °F (16.1 °C)   BP Systolic (56IWT), KTF:307 , Min:85 , VRR:398      Diastolic (59VRI), VNC:31, Min:56, Max:60     PULSE Pulse  Av.5  Min: 80  Max: 83   RR Resp  Av  Min: 18  Max: 18   O2 SAT SpO2  Av %  Min: 96 %  Max: 99 %   OXYGEN DELIVERY O2 Flow Rate (L/min)  Av L/min  Min: 4 L/min  Max: 4 L/min     Systemic Examination:   General appearance - looks comfortable and in no acute distress  Eyes - pupils equal and reactive, extraocular eye movements intact  Mouth - mucous membranes moist, pharynx normal without lesions  Neck - supple, no significant adenopathy  Chest -breath sounds diminished at bases, occasional crackles  Heart - normal rate, regular rhythm, normal S1, S2, no murmurs, rubs, clicks or gallops  Abdomen - soft, nontender, nondistended, no masses or organomegaly  Neurological -awake and alert, slightly confused, no focal findings or movement disorder noted  Extremities - peripheral pulses normal, no pedal edema, no clubbing or cyanosis  Skin - normal coloration and turgor, no rashes, no suspicious skin lesions noted     DATA REVIEW     Medications:  Scheduled Meds:   neomycin-bacitracin-polymyxin   Topical BID    enoxaparin  1 mg/kg Subcutaneous BID    ampicillin-sulbactam  3 g Intravenous Q6H    dextromethorphan-guaiFENesin  10 mL Oral 4 times per day    warfarin (COUMADIN) daily dosing (placeholder)   Other RX Placeholder    multivitamin  1 tablet Oral Daily    midodrine  5 mg Oral TID WC    sodium chloride flush  10 mL Intravenous 2 times per day    ipratropium-albuterol  1 ampule Inhalation Q4H WA    digoxin  125 mcg Oral Daily    dilTIAZem  120 mg Oral Daily    finasteride  5 mg Oral Daily    levothyroxine  75 mcg Oral Daily    tamsulosin  0.8 mg Oral Nightly     Continuous Infusions:    LABS:-  ABG:   No results for input(s): POCPH, POCPCO2, POCPO2, POCHCO3, ZZRH3APE in the last 72 hours.   CBC:   Recent Labs     20  0706 21  0626 21  0510   WBC 9.2 8.6 7.2   HGB 9.5* 9.1* 9.1*   HCT 31.6* 31.0* 30.1*   MCV 94.9 97.5 95.6    241 220   LYMPHOPCT 14* 21* 25   RBC 3.33* 3.18* 3.15*   MCH 28.5 28.6 28.9   MCHC 30.1 29.4 30.2   RDW 19.2* 19.6* 19.5*     BMP:   Recent Labs     20  0706 01/01/21  0626 01/02/21  0510    138 139   K 3.4* 3.5* 3.6*   CL 99 100 100   CO2 28 27 27   BUN 16 13 12   CREATININE 0.74 0.67* 0.70   GLUCOSE 111* 90 89   PHOS 2.8 2.5 2.8     Liver Function Test:   Recent Labs     01/02/21  0510   LABALBU 2.2*     Amylase/Lipase:  No results for input(s): AMYLASE, LIPASE in the last 72 hours. Coagulation Profile:   Recent Labs     12/31/20  0706 01/01/21  0626 01/02/21  0510   INR 1.3 1.5 1.7   PROTIME 13.5* 15.8* 17.2*     Cardiac Enzymes:  No results for input(s): CKTOTAL, CKMB, CKMBINDEX, TROPONINI in the last 72 hours. Lactic Acid:  No results found for: LACTA  BNP:   No results found for: BNP  D-Dimer:  Lab Results   Component Value Date    DDIMER 1.04 12/25/2020     Others:   No results found for: TSH, A7FGRUI, Q6UQAEI, THYROIDAB, FT3, T4FREE  No results found for: BC, RHEUMFACTOR, SEDRATE, CRP  No results found for: LABURIC, URICACID  Lab Results   Component Value Date    IRON 25 (L) 12/28/2020    TIBC 111 (L) 12/28/2020    FERRITIN 837 (H) 12/28/2020     No results found for: SPEP, UPEP  No results found for: PSA, CEA, , OZ2983,     Input/Output:    Intake/Output Summary (Last 24 hours) at 1/2/2021 2202  Last data filed at 1/2/2021 1756  Gross per 24 hour   Intake 900 ml   Output --   Net 900 ml       Microbiology:  No results for input(s): SPECDESC, SPECDESC, SPECIAL, CULTURE, CULTURE, STATUS, ORG, CDIFFTOXPCR, CAMPYLOBPCR, SALMONELLAPC, SHIGAPCR, SHIGELLAPCR, MPNEUG, MPNEUM, LACTOQL in the last 72 hours. Pathology:    Radiology reports:  XR CHEST (SINGLE VIEW FRONTAL)   Final Result   Persistent left lower lobe opacification concerning for pneumonia. Interval   improvement of right basilar infiltrate. Left perihilar atelectasis. XR CHEST PORTABLE   Final Result   Persistent findings of aspiration or pneumonia near the bases, appearing   worsened in the right base and decreased in the lingula.          XR CHEST (2 VW)   Final Result Similar findings to those seen on recent CT, compatible with pneumonia and   some basilar atelectasis/consolidation. CT CHEST PULMONARY EMBOLISM W CONTRAST   Final Result   No central or segmental pulmonary embolus. Bilateral lower lobe consolidation and scattered bilateral ground-glass   nodular opacities are likely on the basis of pneumonia. Recommend follow-up. XR CHEST PORTABLE   Final Result   Subtle patchy bilateral lung opacities increased from the prior study are   likely on the basis of pneumonia. Echocardiogram:   No results found for this or any previous visit. Cardiac Catheterization:   No results found for this or any previous visit. ASSESSMENT AND PLAN     Assessment:    // Acute hypoxemic respiratory failure  // Bilateral multifocal pneumonia  // Paroxysmal atrial fibrillation  // Dementia  // Essential hypertension  // BPH  // Hypothyroidism    Plan:     Patient remains hemodynamically stable   He has been weaned off high flow nasal cannula, currently at 4 L/min   Continue supplemental oxygen to keep oxygen saturation greater than 92%   Anticoagulation, as per primary service   Encourage incentive spirometry   Continue pulmonary toilet, aspiration precautions and bronchodilators   Continue to monitor I/O with a goal of even/negative fluid balance   Antimicrobials reviewed; continue Unasyn   DVT and stress ulcer prophylaxis   Physical/occupational therapy; increase activity as tolerated    It was my pleasure to evaluate Terrell Good today. We will continue to follow. I would like to thank you for allowing me to participate in the care of this patient. Please feel free to call with any further questions or concerns. Adriel Jimenez M.D. Pulmonary and Critical Care Medicine           1/2/2021     Please note that this chart was generated using voice recognition Dragon dictation software.   Although every effort was made to ensure the accuracy of this automated transcription, some errors in transcription may have occurred.

## 2021-01-03 NOTE — PROGRESS NOTES
[facility]  Office: [address], DO, Saadia Lubin, DO, Mark Alba, DO, Jarvis Oviedo, DO, Alayna Sheets MD, Riana Diane MD, Lisette Mistry MD, Bing Hughes MD, Thais Don MD, Chele Casarez MD, Sharma Babinski, MD, Da Acuna MD, Leandra Hyde MD, Adam Werner DO, Bari Fagan MD, Una Breaux MD, Lucila Velazquez, DO, Inna Champagne MD,  Darrel Paz DO, John Paul Oswald MD, Bill Marie MD, Danie Kim, [facility], [facility], [facility], Allan Ortiz, CNP, Nata Jack, CNS, Iveth Castillo, CNP, Nir Cruz, CNP, Jacque Lacey, CNP, Rebeca Douglas, CNP, Xi Reveles, CNP, Latoya Valdes PA-C, Joi Peraza, [facility], Sandra Garay, CNP, Sadi Buchanan, CNP, Tank Schaeffer, CNP, Surinder [place], [facility], [employer], [address] [place] [address]    Progress Note    1/3/2021    8:17 AM    Name:   Jami Leach  MRN:     [id number]     [place]:      [de-identified]   Room:   [facility] Day:  9  Admit Date:  12/25/2020 11:29 AM    PCP:   Ricky Fiore MD  Code Status:  Full Code    Subjective:     C/C:   Chief Complaint   Patient presents with    Respiratory Distress      Interval History Status: improved. Pt was seen and examined this morning  Had an 39 beat run of vtach this morning while asleep  No complaints at this time  Doing better from a respiratory standpoint    Review of Systems:     12 point ROS performed today and negative for anything other than what was stated in subjective     Medications:      Allergies:  No Known Allergies    Current Meds:   Scheduled Meds:    neomycin-bacitracin-polymyxin   Topical BID    enoxaparin  1 mg/kg Subcutaneous BID    ampicillin-sulbactam  3 g Intravenous Q6H    dextromethorphan-guaiFENesin  10 mL Oral 4 times per day    warfarin (COUMADIN) daily dosing (placeholder)   Other RX Placeholder    multivitamin  1 tablet Oral Daily    midodrine  5 mg Oral TID WC    --   --  3.5* 3.6* 3.6*   CL  --   --   --  100 100 99   CO2  --   --   --  27 27 27   GLUCOSE  --   --   --  90 89 91   BUN  --   --   --  13 12 13   CREATININE  --   --   --  0.67* 0.70 0.76   MG  --   --   --  2.2 2.2 2.1   ANIONGAP  --   --   --  11 12 10   LABGLOM  --   --   --  >60 >60 >60   GFRAA  --   --   --  >60 >60 >60   CALCIUM  --   --   --  8.7 8.4* 8.7   PHOS  --   --   --  2.5 2.8 3.2   TROPHS 39* 36* 37*  --   --   --      Recent Labs     01/01/21  0626 01/02/21  0510 01/03/21  0605   LABALBU 2.1* 2.2* 2.4*     ABG:No results found for: POCPH, PHART, PH, POCPCO2, NUJ5VMS, PCO2, POCPO2, PO2ART, PO2, POCHCO3, JBQ0JAA, HCO3, NBEA, PBEA, BEART, BE, THGBART, THB, PFF9EFE, SZRO1VHJ, E4YRKTET, O2SAT, FIO2  Lab Results   Component Value Date/Time    SPECIAL NOT REPORTED 12/26/2020 05:12 PM     Lab Results   Component Value Date/Time    CULTURE PRESUMPTIVE CANDIDA ALBICANS 10 to 50,000 CFU/ML (A) 12/26/2020 05:12 PM       Radiology:  Xr Chest (single View Frontal)    Result Date: 12/30/2020  Persistent left lower lobe opacification concerning for pneumonia. Interval improvement of right basilar infiltrate. Left perihilar atelectasis. Xr Chest Portable    Result Date: 12/27/2020  Persistent findings of aspiration or pneumonia near the bases, appearing worsened in the right base and decreased in the lingula.        Physical Examination:        General appearance: Chronically ill-appearing, NAD  Mental Status: alert and awake, oriented x2  Lungs:  Course to auscultation the lower lobes bilaterally with diminished breath sounds at the bases    Heart:  regular rate and rhythm, no murmur  Abdomen:  soft, nontender, nondistended, normal bowel sounds, no masses, hepatomegaly, splenomegaly +sacral ulcer  Extremities:  no edema, redness, tenderness in the calves  Skin:  no gross lesions, rashes, induration    Assessment:        Hospital Problems           Last Modified POA    * (Principal) Community acquired bacterial pneumonia 12/26/2020 Yes    Hypertension 12/25/2020 Yes    Hypothyroidism 12/25/2020 Yes    Benign prostatic hyperplasia 12/25/2020 Yes    Paroxysmal atrial fibrillation ([address] 12/25/2020 Yes    Chronic anticoagulation 12/25/2020 Yes    Lactic acidosis 12/26/2020 Yes    Sepsis ([address] 12/26/2020 Yes    Acute respiratory failure with hypoxia ([address] 12/27/2020 Yes    Moderate malnutrition ([address] 12/31/2020 Yes          Plan:        1. Acute hypoxic respiratory failure   - 2/2 pneumonia, atelectasis,volume overload and aspiration   - Cultures remain negative.   - pt now on nasal cannula 4 L and doing well   - antibiotic regimen changed to unasyn 3g IV Q6Hr (day 5). Continue until 1/5/21 per ID recommendations   - Ambrocio consulted, aspiration precautions. Recommend dental soft diet with nectar   - Continue incentive spirometry, Duo neb's.   - chest xray done on 12/27 showed persistent findings of aspiration or pneumonia near the bases appearing worsened in the right base   - chest xray done 12/30 showed persistent LLL opacification concerning for pneumonia with interval improvement of right basilar infiltrate   - legionella negative  - strep pneumo negative   - COVID negative      2. Acute Hypokalemia:   - replace     3. Abnormal UA:   - Candiduria likely colonization.      4. Paroxysmal atrial fibrillation:   - Continue Cardizem and Coumadin   - telemetry   - INR currently 1.8. Lovenox for bridging      5. Hypothyroidism:   - Continue Synthroid 75 mcg daily     6. BPH:   - Continue Flomax   - Bladder scan q shift, straight cath for urinary retention greater than 250 cc     7. NCNC anemia:  - stable   - currently stable at 8.8  - continue to monitor     8. VTach   - pt noted to have a 39 beat run of vtach this morning while sleeping   - electrolytes stable   - Mg normal   - will obtain ekg and cardiac consult   - remain on telemetry      9.  DISPO:   - Pending abx completion on 1/5  - PT/OT consulted     Jadiel Obregon MD  1/3/2021  8:17 AM

## 2021-01-03 NOTE — PROGRESS NOTES
Pharmacy Note  Warfarin Consult follow-up      Recent Labs     01/03/21  0605   INR 1.8     Recent Labs     01/01/21  0626 01/02/21  0510 01/03/21  0605   HGB 9.1* 9.1* 8.8*   HCT 31.0* 30.1* 28.9*    220 205       Current warfarin drug-drug interactions: acetaminophen, Unasyn, enoxaparin, levothyroxine    Date INR Dose   12/25 2.1 None   12/26 2.1 2 mg    12/27 1.4 5 mg   12/28/2020 1.2 3mg   12/29/2020 1.1 3mg   12/30/2020 1.2 3 mg    12/31/2020 1.3 5mg   01/01/21 1.5 4mg   01/02/21 1.7 4mg   01/03/21 1.8 4mg     Notes: Will give 4mg warfarin today 1/3/21. Daily PT/INR while inpatient.       Nivia Calvillo, WilmanD

## 2021-01-03 NOTE — PLAN OF CARE
Problem: Pain:  Goal: Control of acute pain  Description: Control of acute pain  Outcome: Ongoing     Problem: Falls - Risk of:  Goal: Will remain free from falls  Description: Will remain free from falls  Outcome: Ongoing     Problem: Skin Integrity:  Goal: Will show no infection signs and symptoms  Description: Will show no infection signs and symptoms  Outcome: Ongoing

## 2021-01-03 NOTE — PROGRESS NOTES
PULMONARY & CRITICAL CARE MEDICINE PROGRESS  NOTE     Patient:  Kaylee Amador  MRN: [id number]  Admit date: 2020  Primary Care Physician: Sylvester Chavez MD  Consulting Physician: Camille House MD  CODE Status: Full Code  LOS: 9    SUBJECTIVE     I personally interviewed/examined the patient, reviewed interval history and interpreted all available radiographic, laboratory data at the time of service. Chief Compliant/Reason for Initial Consult: Pneumonia    Brief Hospital Course: The patient is a 82 y.o. male with multiple medical comorbidities including hypertension, hypothyroidism, atrial fibrillation, dementia resented to [facility] ER with worsening shortness of breath. He was hypoxemic on presentation. He was recently treated for pneumonia and supratherapeutic INR and was discharged on oral doxycycline. Chest x-ray showed worsening pulmonary infiltrates patient had leukocytosis. CT chest was negative for PE. His Covid test was negative. He was on cefepime, which has been changed to Unasyn.     Interval History:  21  Patient is currently on nasal cannula O2 Flow Rate (L/min)  Av L/min  Min: 4 L/min  Max: 4 L/min  T-max is 98.6, white count is 8.3 today  He is remains on Unasyn since , he was previously on cefepime  Patient denies any new complaints  No overnight issues reported    Review of Systems:  Review of Systems   Unable to perform ROS: Dementia     OBJECTIVE     VITAL SIGNS:   LAST-  BP (!) 112/58   Pulse 70   Temp 97.7 °F (36.5 °C)   Resp 18   Ht 6' 3\" (1.905 m)   Wt 183 lb 12.8 oz (83.4 kg)   SpO2 96%   BMI 22.97 kg/m²   8-24 HR RANGE-  TEMP Temp  Av.2 °F (36.8 °C)  Min: 97.7 °F (36.5 °C)  Max: 98.6 °F (37 °C)   BP Systolic (02YCS), QTK:293 , Min:111 , UH      Diastolic (37SBH), SEQ:43, Min:57, Max:60     PULSE Pulse  Av  Min: 65  Max: 83   RR Resp  Av.3  Min: 16  Max: 18   O2 SAT SpO2 Av.7 %  Min: 95 %  Max: 96 %   OXYGEN DELIVERY O2 Flow Rate (L/min)  Av L/min  Min: 4 L/min  Max: 4 L/min     Systemic Examination:   General appearance - looks comfortable and in no acute distress  Eyes - pupils equal and reactive, extraocular eye movements intact  Mouth - mucous membranes moist, pharynx normal without lesions  Neck - supple, no significant adenopathy  Chest -breath sounds diminished at bases, occasional crackles  Heart - normal rate, regular rhythm, normal S1, S2, no murmurs, rubs, clicks or gallops  Abdomen - soft, nontender, nondistended, no masses or organomegaly  Neurological -awake and alert, slightly confused, no focal findings or movement disorder noted  Extremities - peripheral pulses normal, no pedal edema, no clubbing or cyanosis  Skin - normal coloration and turgor, no rashes, no suspicious skin lesions noted     DATA REVIEW     Medications:  Scheduled Meds:   potassium chloride  20 mEq Oral Once    warfarin  4 mg Oral Once    neomycin-bacitracin-polymyxin   Topical BID    enoxaparin  1 mg/kg Subcutaneous BID    ampicillin-sulbactam  3 g Intravenous Q6H    dextromethorphan-guaiFENesin  10 mL Oral 4 times per day    warfarin (COUMADIN) daily dosing (placeholder)   Other RX Placeholder    multivitamin  1 tablet Oral Daily    midodrine  5 mg Oral TID WC    sodium chloride flush  10 mL Intravenous 2 times per day    ipratropium-albuterol  1 ampule Inhalation Q4H WA    digoxin  125 mcg Oral Daily    dilTIAZem  120 mg Oral Daily    finasteride  5 mg Oral Daily    levothyroxine  75 mcg Oral Daily    tamsulosin  0.8 mg Oral Nightly     Continuous Infusions:    LABS:-  ABG:   No results for input(s): POCPH, POCPCO2, POCPO2, POCHCO3, HYJX9BBP in the last 72 hours.   CBC:   Recent Labs     21  0626 21  0510 21  0605   WBC 8.6 7.2 8.3   HGB 9.1* 9.1* 8.8*   HCT 31.0* 30.1* 28.9*   MCV 97.5 95.6 94.4    220 205   LYMPHOPCT 21* 25 26   RBC 3.18* 3.15* bases, appearing   worsened in the right base and decreased in the lingula. XR CHEST (2 VW)   Final Result   Similar findings to those seen on recent CT, compatible with pneumonia and   some basilar atelectasis/consolidation. CT CHEST PULMONARY EMBOLISM W CONTRAST   Final Result   No central or segmental pulmonary embolus. Bilateral lower lobe consolidation and scattered bilateral ground-glass   nodular opacities are likely on the basis of pneumonia. Recommend follow-up. XR CHEST PORTABLE   Final Result   Subtle patchy bilateral lung opacities increased from the prior study are   likely on the basis of pneumonia. Echocardiogram:   No results found for this or any previous visit. Cardiac Catheterization:   No results found for this or any previous visit. ASSESSMENT AND PLAN     Assessment:    // Acute hypoxemic respiratory failure, improving  // Bilateral multifocal pneumonia  // Paroxysmal atrial fibrillation  // Dementia  // Essential hypertension  // BPH  // Hypothyroidism    Plan:     Patient remains hemodynamically stable   He has been weaned off high flow nasal cannula, currently at 4 L/min   Continue supplemental oxygen to keep oxygen saturation greater than 92%   Anticoagulation, as per primary service   Encourage incentive spirometry   Continue pulmonary toilet, aspiration precautions and bronchodilators   Continue to monitor I/O with a goal of even/negative fluid balance   Antimicrobials reviewed; continue Unasyn for total of 7 days   DVT and stress ulcer prophylaxis   Physical/occupational therapy; increase activity as tolerated    It was my pleasure to evaluate Terrell Good today. We will continue to follow. I would like to thank you for allowing me to participate in the care of this patient. Please feel free to call with any further questions or concerns. Surjit Arredondo M.D.    Pulmonary and Critical Care Medicine           1/3/2021     Please note that this chart was generated using voice recognition Dragon dictation software. Although every effort was made to ensure the accuracy of this automated transcription, some errors in transcription may have occurred.

## 2021-01-03 NOTE — CONSULTS
[facility]   Consult Note         Today's Date: 1/3/2021  Patient Name: Fabio Wayne  Date of admission: 12/25/2020 11:29 AM  Patient's age: 82 y.o., 1935  Admission Dx: Pneumonia [J18.9]    Reason for Consult:  Cardiac evaluation    Requesting Physician: Jessee Rodriguez DO    REASON FOR CONSULT: Nonsustained first episode of ventricular tachycardia      History Obtained From:  Patient, chart, staff, records    HISTORY OF PRESENT ILLNESS:      The patient is a 82 y.o. male with PMH significant for hypertension, paroxysmal A. fib, CAD. Patient was admitted on 12/25/2020 for management of pneumonia. Patient presented from [facility] ER with shortness of breath, at baseline he is oxygen dependent on 4 L nasal cannula. He is currently being treated with IV Unasyn 3 g every 6 hours. He is on digoxin 125 MCG, Cardizem 120 mg and warfarin daily for [facility] for A. Fib. Last night, patient had 1 run of unsustained ventricular tachycardia, 39 beats. Potassium was 3.6, has been persistently low. Magnesium 2.1. Currently patient is asymptomatic, denies any chest pain, lightheadedness, dizziness. Flat troponins. Past Medical History:   has a past medical history of Atherosclerosis, Benign prostatic hyperplasia, Hypertension, Hypothyroidism, Osteoporosis, Paroxysmal A-fib ([address] Pneumonia, Rhabdomyolysis, Smoking, Spondylolisthesis, and Vitamin D deficiency. Past Surgical History:   has a past surgical history that includes Ankle surgery; hip surgery; and Tonsillectomy and adenoidectomy. Home Medications:    Prior to Admission medications    Medication Sig Start Date End Date Taking?  Authorizing Provider   nystatin (MYCOSTATIN) POWD powder Apply topically 2 times daily groin   Yes Historical Provider, MD   warfarin (COUMADIN) 2 MG tablet Take 1 tablet by mouth daily 12/19 12/20 repeat INR 12/21 12/18/20  Yes VAIBHAV Farr CNP   digoxin (LANOXIN) 125 MCG tablet Take 125 mcg by mouth daily   Yes Historical Provider, MD   dilTIAZem (CARDIZEM LA) 120 MG TB24 extended release tablet Take 120 mg by mouth daily   Yes Historical Provider, MD   ergocalciferol (ERGOCALCIFEROL) 1.25 MG (66497 UT) capsule Take 50,000 Units by mouth once a week Saturday 11/14/20 12/25/20 Yes Historical Provider, MD   finasteride (PROSCAR) 5 MG tablet Take 5 mg by mouth daily   Yes Historical Provider, MD   oxyCODONE-acetaminophen (PERCOCET) 5-325 MG per tablet Take 1-2 tablets by mouth every 6 hours. 11/11/20  Yes Historical Provider, MD   tamsulosin (FLOMAX) 0.4 MG capsule Take 0.8 mg by mouth nightly   Yes Historical Provider, MD   levothyroxine (SYNTHROID) 100 MCG tablet Take 75 mcg by mouth daily   Yes Historical Provider, MD   collagenase 250 UNIT/GM ointment Apply 250 g topically daily 11/11/20  Yes Historical Provider, MD   acetaminophen (TYLENOL) 325 MG tablet Take 650 mg by mouth every 4 hours as needed for Pain   Yes Historical Provider, MD   Cholecalciferol (VITAMIN D3) 125 MCG (5000 UT) TABS Take by mouth daily   Yes Historical Provider, MD       potassium chloride, 20 mEq, Oral, Once    neomycin-bacitracin-polymyxin, , Topical, BID    enoxaparin, 1 mg/kg, Subcutaneous, BID    ampicillin-sulbactam, 3 g, Intravenous, Q6H    dextromethorphan-guaiFENesin, 10 mL, Oral, 4 times per day    warfarin (COUMADIN) daily dosing (placeholder), , Other, RX Placeholder    multivitamin, 1 tablet, Oral, Daily    midodrine, 5 mg, Oral, TID WC    sodium chloride flush, 10 mL, Intravenous, 2 times per day    ipratropium-albuterol, 1 ampule, Inhalation, Q4H WA    digoxin, 125 mcg, Oral, Daily    dilTIAZem, 120 mg, Oral, Daily    finasteride, 5 mg, Oral, Daily    levothyroxine, 75 mcg, Oral, Daily    tamsulosin, 0.8 mg, Oral, Nightly      Allergies:  Patient has no known allergies. Social History:   reports that he has never smoked.  He has never used smokeless tobacco. He reports that he does not drink alcohol or use drugs. Family History: family history is not on file. No h/o sudden cardiac death. REVIEW OF SYSTEMS:    · Constitutional: there has been no unanticipated weight loss. There's been No change in energy level, No change in activity level. · Eyes: No visual changes or diplopia. No scleral icterus. · ENT: No Headaches, hearing loss or vertigo. No mouth sores or sore throat. · Cardiovascular: per HPI  · Respiratory: per HPI  · Gastrointestinal: No abdominal pain, appetite loss, blood in stools. No change in bowel or bladder habits. · Genitourinary: No dysuria, trouble voiding, or hematuria. · Musculoskeletal:  No gait disturbance, No weakness or joint complaints. · Integumentary: No rash or pruritis. · Neurological: No headache, diplopia, change in muscle strength, numbness or tingling. No change in gait, balance, coordination, mood, affect, memory, mentation, behavior. · Psychiatric: No anxiety, or depression. · Endocrine: No temperature intolerance. No excessive thirst, fluid intake, or urination. No tremor. · Hematologic/Lymphatic: No abnormal bruising or bleeding, blood clots or swollen lymph nodes. · Allergic/Immunologic: No nasal congestion or hives. PHYSICAL EXAM:      BP (!) 112/58   Pulse 70   Temp 97.7 °F (36.5 °C)   Resp 18   Ht 6' 3\" (1.905 m)   Wt 183 lb 12.8 oz (83.4 kg)   SpO2 96%   BMI 22.97 kg/m²    Constitutional and General Appearance: alert, cooperative, no distress and appears stated age  HEENT: PERRL, no cervical lymphadenopathy. No masses palpable. Normal oral mucosa  Respiratory:  · Normal excursion and expansion without use of accessory muscles  · Resp Auscultation: Good respiratory effort. No for increased work of breathing.  On auscultation: clear to auscultation bilaterally  Cardiovascular:  · The apical impulse is not displaced  · Heart tones are crisp and normal. regular S1 and S2.  · Jugular venous pulsation Normal  · The carotid upstroke is normal in amplitude and contour without delay or bruit  · Peripheral pulses are symmetrical and full   Abdomen:   · No masses or tenderness  · Bowel sounds present  Extremities:  ·  No Cyanosis or Clubbing  ·  Lower extremity edema: No  ·  Skin: Warm and dry  Neurological:  · Alert and oriented. · Moves all extremities well  · No abnormalities of mood, affect, memory, mentation, or behavior are noted        EKG:    Date: 01/03/21  Reading: EKG showed normal sinus rhythm, NS ST-T abnormality      LAST ECHO:  none      LAST Stress Test:   none    LAST Cardiac Angiography:.  none      Labs:     CBC:   Recent Labs     01/02/21  0510 01/03/21  0605   WBC 7.2 8.3   HGB 9.1* 8.8*   HCT 30.1* 28.9*    205     BMP:   Recent Labs     01/02/21  0510 01/03/21  0605    136   K 3.6* 3.6*   CO2 27 27   BUN 12 13   CREATININE 0.70 0.76   LABGLOM >60 >60   GLUCOSE 89 91     BNP: No results for input(s): BNP in the last 72 hours. PT/INR:   Recent Labs     01/02/21  0510 01/03/21  0605   PROTIME 17.2* 18.0*   INR 1.7 1.8     APTT:No results for input(s): APTT in the last 72 hours. CARDIAC ENZYMES:No results for input(s): CKTOTAL, CKMB, CKMBINDEX, TROPONINI in the last 72 hours.   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)    Hypertension    Hypothyroidism    Benign prostatic hyperplasia    Paroxysmal atrial fibrillation (HCC)    Vitamin D deficiency    Chronic respiratory failure with hypoxia, on home oxygen therapy ([facility]    Pneumonia due to infectious organism    Coagulopathy ([facility]    Community acquired bacterial pneumonia    Chronic anticoagulation    Lactic acidosis    Sepsis ([facility]    Acute respiratory failure with hypoxia (HCC)    Moderate malnutrition ([facility]       IMPRESSION & Recommendations:    - Nonsustained ventricular tachycardia-likely secondary to hypokalemia. - Pneumonia   - PAF, currently in NSR     1.  Echocardiogram ordered, follow-up. 2.  Replace electrolytes: Keep K >4, Mag >2  3. Continue to monitor telemetry; if patient continues to have episodes of V. tach, will start amiodarone. Monitor vitals. Discussed with patient, family, and Nurse. Electronically signed by Marilu Parker MD on 1/3/2021 at 12:32 PM    Marilu Parker MD  PGY-1, [address] [facility]. Attending Physician Statement  I have discussed the care of Terrell Lopez, including pertinent history and exam findings,  with the cardiology fellow/resident. I have seen and examined the patient and the key elements of all parts of the encounter have been performed by me. I agree with the assessment, plan and orders as documented by the resident with additional recommendations as below:     Patient with PAF on [facility] with coumadin and chronic hypoxic resp failure on home oxygen admitted with PNA. Noted to have NSVT (39 beats, 21 seconds ) last night. Currently in NSR. Will obtain TTE. Continue cardizem and po dig. Replace K. If recurrent ectopy or NSVT, will consider starting po amio for short term use.         Radha Olson MD, [address] [facility]

## 2021-01-04 NOTE — PROGRESS NOTES
Comprehensive Nutrition Assessment    Type and Reason for Visit:  Initial    Nutrition Recommendations/Plan: Continue Dental Soft Diet with Nectar Thickened Liquids as tolerated. Continue frozen oral nutrition supplement (Magic Cup) 2x/d as tolerated. Monitor/encourage intakes. Nutrition Assessment:  Patient continues on a Dental Soft Diet with nectar thickened liquids. Writer unable to speak with patient at time of visit as he was receiving care. Per EHR, patient consumed % of meals yesterday and today. Malnutrition Assessment:  Malnutrition Status: Moderate malnutrition    Context:  Acute Illness     Findings of the 6 clinical characteristics of malnutrition:  Energy Intake:  No significant decrease in energy intake  Weight Loss:  7 - Greater than 2% over 1 week     Body Fat Loss:  1 - Mild body fat loss- Orbital, Triceps, Buccal region   Muscle Mass Loss:  1 - Mild muscle mass loss- Temples (temporalis), Clavicles (pectoralis & deltoids), Scapula (trapezius)  Fluid Accumulation:  1 - Mild Extremities, Generalized   Strength:  Not Performed    Estimated Daily Nutrient Needs:  Energy (kcal):  30-32 ~> 4488-6770 kcals/d; Weight Used for Energy Requirements:  Admission     Protein (g):  1.5-1.7 gm/kg ~>125-140 gms/d; Weight Used for Protein Requirements:  Admission          Nutrition Related Findings:  Labs reviewed. Meds reviewed: Synthroid. Last BM 1/3. Non-pitting generalized, +1 non-pitting RLE edema. Wounds:  Multiple, Skin Tears, Pressure Injury       Current Nutrition Therapies:    DIET DENTAL SOFT; Mildly Thick (Nectar)  Dietary Nutrition Supplements: Frozen Oral Supplement    Anthropometric Measures:  · Height: 6' 3\" (190.5 cm)  · Current Body Weight: 183 lb 13.8 oz (83.4 kg)   · Admission Body Weight: 183 lb (83 kg)     · Ideal Body Weight: 196 lbs; % Ideal Body Weight 93.8 %   · BMI: 23  · Adjusted Body Weight:  No Adjustment   · BMI Categories: Normal Weight (BMI 18.5-24. 9) Nutrition Diagnosis:   · Moderate malnutrition, In context of acute illness or injury related to swallowing difficulty, cognitive or neurological impairment(advanced age) as evidenced by intake 51-75%, localized or generalized fluid accumulation, mild loss of subcutaneous fat, mild muscle loss, need for ONS    Nutrition Interventions:   Food and/or Nutrient Delivery:  Continue Current Diet, Continue Oral Nutrition Supplement  Nutrition Education/Counseling:  No recommendation at this time   Coordination of Nutrition Care:  Continue to monitor while inpatient    Goals:  Pt to meet % of est'd needs via PO       Nutrition Monitoring and Evaluation:   Behavioral-Environmental Outcomes:  None Identified   Food/Nutrient Intake Outcomes:  Food and Nutrient Intake, Supplement Intake  Physical Signs/Symptoms Outcomes:  Biochemical Data, Chewing or Swallowing, GI Status, Fluid Status or Edema, Nutrition Focused Physical Findings, Skin, Weight     Discharge Planning:     Too soon to determine     Electronically signed by Marcelina Bolden RD, LD on 1/4/21 at 2:27 PM EST    Contact: [phone]

## 2021-01-04 NOTE — PROGRESS NOTES
Physical Therapy  Facility/Department: [address] ORTHO/MED SURG  Daily Treatment Note  NAME: Lisa Bhakta  : 1935  MRN: [id number]    Date of Service: 2021    Discharge Recommendations:  Patient would benefit from continued therapy after discharge   PT Equipment Recommendations  Other: CTA    Assessment   Body structures, Functions, Activity limitations: Decreased functional mobility ; Decreased strength;Decreased endurance;Decreased balance  Assessment: With max encouragement from therapist pt ambulated a total of 8ft Boogie and use of RW. After a seated rest break, pt declined any further ambulation. Pt adamantly refused standing again to get cleaned up by RN. Pt began stating he cannot stand or walk because his socks are too slippery. Pt very confused. Would benefit from further PT services to regain functional independence. Prognosis: Good  PT Education: PT Role;Plan of Care;General Safety;Goals;Transfer Training;Gait Training;Home Exercise Program;Functional Mobility Training  REQUIRES PT FOLLOW UP: Yes  Activity Tolerance  Activity Tolerance: Patient limited by fatigue;Patient limited by cognitive status; Patient limited by endurance     Patient Diagnosis(es): The primary encounter diagnosis was Pneumonia due to organism. Diagnoses of Warfarin-induced coagulopathy ([address] and Elevated troponin were also pertinent to this visit. has a past medical history of Atherosclerosis, Benign prostatic hyperplasia, Hypertension, Hypothyroidism, Osteoporosis, Paroxysmal A-fib ([address] Pneumonia, Rhabdomyolysis, Smoking, Spondylolisthesis, and Vitamin D deficiency. has a past surgical history that includes Ankle surgery; hip surgery; and Tonsillectomy and adenoidectomy.     Restrictions  Restrictions/Precautions  Restrictions/Precautions: Fall Risk, Up as Tolerated  Required Braces or Orthoses?: No  Position Activity Restriction  Other position/activity restrictions: O2 NC; telesitter  Subjective   General  Chart Reviewed: Yes  Response To Previous Treatment: Patient with no complaints from previous session. Family / Caregiver Present: No  Subjective  Subjective: RN and pt agreeable to PT this morning. Pt sitting up in chair upon arrival on 3L O2 NC and with no c/o pain. Telesitter in room. Pain Screening  Patient Currently in Pain: Denies  Vital Signs  Patient Currently in Pain: Denies       Orientation  Orientation  Overall Orientation Status: Impaired  Orientation Level: Disoriented to time;Disoriented to place; Disoriented to situation;Oriented to person  Cognition   Cognition  Overall Cognitive Status: Exceptions  Arousal/Alertness: Delayed responses to stimuli  Following Commands: Follows one step commands with repetition; Follows one step commands with increased time; Inconsistently follows commands  Attention Span: Unable to maintain attention  Memory: Decreased recall of precautions;Decreased recall of recent events;Decreased short term memory;Decreased recall of biographical Information  Safety Judgement: Decreased awareness of need for assistance;Decreased awareness of need for safety  Problem Solving: Decreased awareness of errors;Assistance required to identify errors made;Assistance required to generate solutions;Assistance required to implement solutions;Assistance required to correct errors made  Insights: Decreased awareness of deficits  Initiation: Requires cues for all  Sequencing: Requires cues for all  Cognition Comment: pt short term memory is approx 30sec-1min  Objective   Bed mobility  Comment: Pt up in chair upon arrival/exit this date. Transfers  Sit to Stand: Minimal Assistance  Stand to sit: Minimal Assistance  Comment: Pt required max VC's for hand placement during tranfers.   Ambulation  Ambulation?: Yes  Ambulation 1  Surface: level tile  Device: Rolling Walker  Other Apparatus: O2(3L)  Assistance: Minimal assistance  Gait Deviations: Slow Chiara;Decreased step length;Decreased step height  Distance: 4ft forwards, 4 ft backwards. Comments: Pt took a seated rest break, and then declined further ambulation. Stairs/Curb  Stairs?: No     Balance  Posture: Fair  Sitting - Static: Good  Sitting - Dynamic: Good;-  Standing - Static: Fair;-  Standing - Dynamic: Fair;-  Comments: standing balance assessed with RW. Exercises:  Seated LE exercise program: [employer], heel/toe raises, and marches. Reps: 15x each LE  Comments: Pt required max VC's for correct sequence of LE exercises.     Goals  Short term goals  Time Frame for Short term goals: 14 visits  Short term goal 1: Pt will be Inessa bed mobility  Short term goal 2: Pt will be Inessa transfers  Short term goal 3: Pt will be Inessa amb 240' RW or least restrictive AD  Short term goal 4: Pt will navigate 2 steps Inessa    Plan    Plan  Times per week: 4- 5x/wk  Times per day: Daily  Current Treatment Recommendations: Strengthening, Endurance Training, Functional Mobility Training, Transfer Training, Gait Training, Balance Training, Stair training, Home Exercise Program, Safety Education & Training, Patient/Caregiver Education & Training, Equipment Evaluation, Education, & procurement  Safety Devices  Type of devices: Call light within reach, Gait belt, All fall risk precautions in place, Nurse notified, Left in chair, Chair alarm in place, Telesitter in use, Patient at risk for falls  Restraints  Initially in place: No     Therapy Time   Individual Concurrent Group Co-treatment   Time In 0908         Time Out 0940         Minutes 32         Timed Code Treatment Minutes: [address] [facility],[address], [facility]

## 2021-01-04 NOTE — PROGRESS NOTES
input(s): LDLCALCU  INR:   Recent Labs     01/02/21  0510 01/03/21  0605 01/04/21  0700   INR 1.7 1.8 1.8       Objective:   Vitals: BP (!) 110/92   Pulse 78   Temp 98.1 °F (36.7 °C) (Oral)   Resp 18   Ht 6' 3\" (1.905 m)   Wt 183 lb 12.8 oz (83.4 kg)   SpO2 98%   BMI 22.97 kg/m²   General appearance: alert and cooperative with exam  HEENT: Head: Normocephalic, no lesions, without obvious abnormality. Neck:no JVD, trachea midline, no adenopathy  Lungs: Clear to auscultation, diminished, upper lobe rhonchi, no rales  Heart: Irregular rate and rhythm, s1/s2 auscultated, no murmurs, Afib 80s  Abdomen: soft, non-tender, bowel sounds active  Extremities: no edema  Neurologic: not done        Assessment / Acute Cardiac Problems:   1. NSVT  2. PAF  3. HTn  4. CAD  5. PNA  6. Chronic resp failure on NC oxygen at home  7. Hypokalemia    Patient Active Problem List:     Elevated INR     Age-related osteoporosis with current pathological fracture     Atherosclerotic peripheral vascular disease (HCC)     Closed nondisplaced longitudinal fracture of right patella     Pathological compression fracture of lumbar vertebra (HCC)     Hypertension     Hypothyroidism     Benign prostatic hyperplasia     Paroxysmal atrial fibrillation (HCC)     Vitamin D deficiency     Chronic respiratory failure with hypoxia, on home oxygen therapy ([address]     Pneumonia due to infectious organism     Coagulopathy ([address]     Community acquired bacterial pneumonia     Chronic anticoagulation     Lactic acidosis     Sepsis ([address]     Acute respiratory failure with hypoxia (HCC)     Moderate malnutrition ([address]      Plan of Treatment:   1. Awaiting echo. Further work-up pending findings  2. No further NSVT noted. Continue to monitor. Continue PO Cardizem & dig. Coumadin  3. Keep K >4 and Mg >2.     Electronically signed by VAIBHAV Willingham CNP on 1/4/2021 at 12:03 PM  [address]  [phone]

## 2021-01-04 NOTE — PROGRESS NOTES
Pharmacy Note  Warfarin Consult follow-up      Recent Labs     01/04/21  0700   INR 1.8     Recent Labs     01/02/21  0510 01/03/21  0605 01/04/21  0700   HGB 9.1* 8.8* 8.8*   HCT 30.1* 28.9* 29.1*    205 222       Significant Drug-Drug Interactions:  New warfarin drug-drug interactions: none  Discontinued drug-drug interactions: none  Current warfarin drug-drug interactions: acetaminophen, Unasyn, enoxaparin, levothyroxine    Date INR Dose   12/25 2.1 None   12/26 2.1 2 mg    12/27 1.4 5 mg   12/28/2020 1.2 3mg   12/29/2020 1.1 3mg   12/30/2020 1.2 3 mg    12/31/2020 1.3 5mg   01/01/21 1.5 4mg   01/02/21 1.7 4mg   01/03/21 1.8 4mg   1/4/2020 1.8 3 mg        Notes:          INR remains subtherapeutic. Will order warfarin 3 mg x 1 which,  is an increase from his home dose of 2 mg daily. Daily PT/INR while inpatient. [address] [place] [address]  Ph., CACP, Clinical Pharmacist  Anticoagulation Services, [address]  1/4/2021  9:14 AM

## 2021-01-04 NOTE — PROGRESS NOTES
Occupational Therapy  Facility/Department: [address] ORTHO/MED SURG  Daily Treatment Note  NAME: Elizabeth Monroe  : 1935  MRN: [id number]    Date of Service: 2021    Discharge Recommendations:  Patient would benefit from continued therapy after discharge in order to increase balance, activity tolerance and independence. Pt req  care is extremely unsafe sec to decreased cognition at increased risk for falls. Assessment   Performance deficits / Impairments: Decreased functional mobility ; Decreased ADL status; Decreased strength;Decreased safe awareness;Decreased cognition;Decreased endurance;Decreased balance;Decreased high-level IADLs  Prognosis: Fair  OT Education: OT Role;Transfer Training;Precautions;Orientation  Patient Education: purpose of therapy; proper hand and foot placement; sequencing; orientation  Barriers to Learning: pt demo P carry over with memory tasks; F carry over with short simple repetitive commands  REQUIRES OT FOLLOW UP: Yes  Activity Tolerance  Activity Tolerance: Treatment limited secondary to decreased cognition  Safety Devices  Safety Devices in place: Yes  Type of devices: Gait belt;Patient at risk for falls; Left in chair;Chair alarm in place;Call light within reach;Nurse notified         Patient Diagnosis(es): The primary encounter diagnosis was Pneumonia due to organism. Diagnoses of Warfarin-induced coagulopathy ([address] and Elevated troponin were also pertinent to this visit. has a past medical history of Atherosclerosis, Benign prostatic hyperplasia, Hypertension, Hypothyroidism, Osteoporosis, Paroxysmal A-fib ([address] Pneumonia, Rhabdomyolysis, Smoking, Spondylolisthesis, and Vitamin D deficiency. has a past surgical history that includes Ankle surgery; hip surgery; and Tonsillectomy and adenoidectomy.     Restrictions  Restrictions/Precautions  Restrictions/Precautions: Fall Risk, Up as Tolerated  Required Braces or Orthoses?: No  Position Activity Restriction  Other position/activity restrictions: O2 NC; telesitter  Subjective   General  Chart Reviewed: Yes  Patient assessed for rehabilitation services?: Yes  Family / Caregiver Present: No  General Comment  Comments: pt intermitently c/o pain in L foot however unable to rate  Vital Signs  Patient Currently in Pain: Yes   Orientation  Orientation  Overall Orientation Status: Impaired  Orientation Level: Oriented to person;Disoriented to place; Disoriented to time;Disoriented to situation  Objective    ADL  Grooming: Stand by assistance;Setup;Verbal cueing; Increased time to complete(SBA for face washing completed seated)  LE Dressing: Moderate assistance(to doff/don socks pt able to adjust)  Additional Comments: Pt short term memory lasting approx 30sec-1min req max redirection and instructions for activities. Oral care attempted pt req max instruction on sequencing and intiation for each step however after max encouragement and increased time pt declining oral care. Pt req simple activities with short simple commands  Balance  Sitting Balance: Contact guard assistance(Pt tolerated approx 2-3 min static sitting at EOB)  Standing Balance: Minimal assistance  Standing Balance  Time: Pt tolerated approx 1 min  Activity: static standing  Comment: utilizing RW  Functional Mobility  Functional - Mobility Device: Rolling Walker  Activity: Other  Assist Level: Minimal assistance  Functional Mobility Comments: EOB>chair taking few small steps  Bed mobility  Supine to Sit: Maximum assistance  Scooting: Maximal assistance  Comment: Max verbal/tactile cues req for sequencing pt following approx <50% of commands  Transfers  Stand Step Transfers: Minimal assistance  Sit to stand: Moderate assistance  Stand to sit: Minimal assistance  Transfer Comments: utilizing RW req max encouragement  Cognition  Overall Cognitive Status: Exceptions  Arousal/Alertness: Delayed responses to stimuli  Following Commands:  Follows one step commands with repetition; Follows one step commands with increased time; Inconsistently follows commands  Attention Span: Unable to maintain attention  Memory: Decreased recall of precautions;Decreased recall of recent events;Decreased short term memory;Decreased recall of biographical Information  Safety Judgement: Decreased awareness of need for assistance;Decreased awareness of need for safety  Problem Solving: Decreased awareness of errors;Assistance required to identify errors made;Assistance required to generate solutions;Assistance required to implement solutions;Assistance required to correct errors made  Insights: Decreased awareness of deficits  Initiation: Requires cues for all  Sequencing: Requires cues for all  Cognition Comment: pt short term memory is approx 30sec-1min     Pt and RN agreeable to therapy this day. ADL tasks of grooming completed see above for LOF. Increased time req sec to pt decreased cognition. At session end pt seated in chair with call light in reach, chair alarm on, tray table in front of pt and telesitter present. RN aware.    Plan   Plan  Times per week: 3-4 x/wk  Current Treatment Recommendations: Balance Training, Functional Mobility Training, Endurance Training, Safety Education & Training, Self-Care / ADL, Equipment Evaluation, Education, & procurement, Patient/Caregiver Education & Training, Cognitive Reorientation   Cont POC    Goals  Short term goals  Time Frame for Short term goals: By discharge, pt will:  Short term goal 1: Demo Min A for functional tranfers and functional mobility with use of LRD PRN  Short term goal 2: Demo I with setup for UB ADLs and grooming tasks  Short term goal 3: Demo CGA with setup for LB ADLs and toileting tasks  Short term goal 4: Demo 10+ minutes dynamic standing task to increase safety and independence with ADLs/IADLs  Short term goal 5: Follow 75% commands to increase participation with ADLs/IADLs  Short term goal 6: Demo good safety awareness throughout

## 2021-01-04 NOTE — PROGRESS NOTES
PULMONARY & CRITICAL CARE MEDICINE PROGRESS  NOTE     Patient:  Ortega Farley  MRN: [id number]  Admit date: 2020  Primary Care Physician: Phillip Bey MD  Consulting Physician: Inna Disla MD  CODE Status: Full Code  LOS: 10    SUBJECTIVE     I personally interviewed/examined the patient, reviewed interval history and interpreted all available radiographic, laboratory data at the time of service. Chief Compliant/Reason for Initial Consult: Pneumonia    Brief Hospital Course: The patient is a 82 y.o. male with multiple medical comorbidities including hypertension, hypothyroidism, atrial fibrillation, dementia resented to \[facility]\"" ER with worsening shortness of breath. He was hypoxemic on presentation. He was recently treated for pneumonia and supratherapeutic INR and was discharged on oral doxycycline. Chest x-ray showed worsening pulmonary infiltrates patient had leukocytosis. CT chest was negative for PE. His Covid test was negative. He was on cefepime, which has been changed to Unasyn. Interval History:  21  Patient seen and examined bedside. Labs and chart reviewed. No acute overnight events. Awake, alert and oriented x3. Lying comfortably in bed. Patient is currently on nasal cannula 2 to 3 L. Denies any new complaints. No leukocytosis. Afebrile. Hemodynamically stable. Continuing on Unasyn for aspiration pneumonia, started on . Plan to continue until 2021 per ID. Continuing on therapeutic dose Lovenox for A. fib. Started bridging with warfarin per primary.     Review of Systems:  Review of Systems   Unable to perform ROS: Dementia     OBJECTIVE     VITAL SIGNS:   LAST-  BP (!) 110/92   Pulse 78   Temp 98.1 °F (36.7 °C) (Oral)   Resp 18   Ht 6' 3\" (1.905 m)   Wt 183 lb 12.8 oz (83.4 kg)   SpO2 98%   BMI 22.97 kg/m²   8-24 HR RANGE-  TEMP Temp  Av °F (36.7 °C)  Min: 97.9 °F (36.6 °C)  Max: 98.1 °F (58.0 °C)   BP Systolic (88EBN), BXH:479 , Min:91 , NWI:228      Diastolic (60AWO), DZS:19, Min:46, Max:92     PULSE Pulse  Av  Min: 76  Max: 86   RR Resp  Av  Min: 18  Max: 18   O2 SAT SpO2  Av %  Min: 98 %  Max: 98 %   OXYGEN DELIVERY No data recorded     Systemic Examination:   General appearance - looks comfortable and in no acute distress  Eyes - pupils equal and reactive, extraocular eye movements intact  Mouth - mucous membranes moist, pharynx normal without lesions  Neck - supple, no significant adenopathy  Chest -breath sounds diminished at bases, occasional crackles  Heart - normal rate, regular rhythm, normal S1, S2, no murmurs, rubs, clicks or gallops  Abdomen - soft, nontender, nondistended, no masses or organomegaly  Neurological -awake and alert, slightly confused, no focal findings or movement disorder noted  Extremities - peripheral pulses normal, no pedal edema, no clubbing or cyanosis  Skin - normal coloration and turgor, no rashes, no suspicious skin lesions noted     DATA REVIEW     Medications:  Scheduled Meds:   warfarin  3 mg Oral Once    potassium chloride  20 mEq Oral Once    neomycin-bacitracin-polymyxin   Topical BID    enoxaparin  1 mg/kg Subcutaneous BID    ampicillin-sulbactam  3 g Intravenous Q6H    dextromethorphan-guaiFENesin  10 mL Oral 4 times per day    warfarin (COUMADIN) daily dosing (placeholder)   Other RX Placeholder    multivitamin  1 tablet Oral Daily    midodrine  5 mg Oral TID     sodium chloride flush  10 mL Intravenous 2 times per day    ipratropium-albuterol  1 ampule Inhalation Q4H WA    digoxin  125 mcg Oral Daily    dilTIAZem  120 mg Oral Daily    finasteride  5 mg Oral Daily    levothyroxine  75 mcg Oral Daily    tamsulosin  0.8 mg Oral Nightly     Continuous Infusions:    LABS:-  ABG:   No results for input(s): POCPH, POCPCO2, POCPO2, POCHCO3, HFPT4HKB in the last 72 hours.   CBC:   Recent Labs     21  0510 01/03/21  0605 01/04/21  0700   WBC 7.2 8.3 7.3   HGB 9.1* 8.8* 8.8*   HCT 30.1* 28.9* 29.1*   MCV 95.6 94.4 94.8    205 222   LYMPHOPCT 25 26 26   RBC 3.15* 3.06* 3.07*   MCH 28.9 28.8 28.7   MCHC 30.2 30.4 30.2   RDW 19.5* 19.5* 19.7*     BMP:   Recent Labs     01/02/21  0510 01/03/21  0605 01/04/21  0700    136 141   K 3.6* 3.6* 3.8    99 103   CO2 27 27 30   BUN 12 13 12   CREATININE 0.70 0.76 0.82   GLUCOSE 89 91 87   PHOS 2.8 3.2 3.1     Liver Function Test:   Recent Labs     01/04/21  0700   LABALBU 2.4*     Amylase/Lipase:  No results for input(s): AMYLASE, LIPASE in the last 72 hours. Coagulation Profile:   Recent Labs     01/02/21  0510 01/03/21  0605 01/04/21  0700   INR 1.7 1.8 1.8   PROTIME 17.2* 18.0* 18.3*     Cardiac Enzymes:  No results for input(s): CKTOTAL, CKMB, CKMBINDEX, TROPONINI in the last 72 hours. Lactic Acid:  No results found for: LACTA  BNP:   No results found for: BNP  D-Dimer:  Lab Results   Component Value Date    DDIMER 1.04 12/25/2020     Others:   No results found for: TSH, L7VZCSY, G9OXCYA, THYROIDAB, FT3, T4FREE  No results found for: BC, RHEUMFACTOR, SEDRATE, CRP  No results found for: LABURIC, URICACID  Lab Results   Component Value Date    IRON 25 (L) 12/28/2020    TIBC 111 (L) 12/28/2020    FERRITIN 837 (H) 12/28/2020     No results found for: SPEP, UPEP  No results found for: PSA, CEA, , GE1990,     Input/Output:    Intake/Output Summary (Last 24 hours) at 1/4/2021 1403  Last data filed at 1/3/2021 2324  Gross per 24 hour   Intake 900 ml   Output 120 ml   Net 780 ml       Microbiology:  No results for input(s): SPECDESC, SPECDESC, SPECIAL, CULTURE, CULTURE, STATUS, ORG, CDIFFTOXPCR, CAMPYLOBPCR, SALMONELLAPC, SHIGAPCR, SHIGELLAPCR, MPNEUG, MPNEUM, LACTOQL in the last 72 hours. Pathology:    Radiology reports:  XR CHEST (SINGLE VIEW FRONTAL)   Final Result   Persistent left lower lobe opacification concerning for pneumonia.   Interval improvement of right basilar infiltrate. Left perihilar atelectasis. XR CHEST PORTABLE   Final Result   Persistent findings of aspiration or pneumonia near the bases, appearing   worsened in the right base and decreased in the lingula. XR CHEST (2 VW)   Final Result   Similar findings to those seen on recent CT, compatible with pneumonia and   some basilar atelectasis/consolidation. CT CHEST PULMONARY EMBOLISM W CONTRAST   Final Result   No central or segmental pulmonary embolus. Bilateral lower lobe consolidation and scattered bilateral ground-glass   nodular opacities are likely on the basis of pneumonia. Recommend follow-up. XR CHEST PORTABLE   Final Result   Subtle patchy bilateral lung opacities increased from the prior study are   likely on the basis of pneumonia. Echocardiogram:   No results found for this or any previous visit. Cardiac Catheterization:   No results found for this or any previous visit. ASSESSMENT AND PLAN     Assessment:    // Acute hypoxemic respiratory failure, improving  // Bilateral multifocal pneumonia  // Paroxysmal atrial fibrillation  // Dementia  // Essential hypertension  // BPH  // Hypothyroidism    Plan:     Patient remains hemodynamically stable   He has been weaned off high flow nasal cannula, currently at 4 L/min   Continue supplemental oxygen to keep oxygen saturation greater than 92%   Agree with anticoagulation with Lovenox and bridging to warfarin. Management of anticoagulation for A. fib per primary.  Antimicrobials reviewed; continue Unasyn for total of 7 days till 1/5/2021. It was my pleasure to evaluate Adam Shoemaker today. We will continue to follow. I would like to thank you for allowing me to participate in the care of this patient. Please feel free to call with any further questions or concerns. Rosalva Rod M.D.   Internal medicine resident, PGY 2  Pulmonary and Critical Care Medicine           1/4/2021   Attending Physician Statement  I have discussed the care of Terrell Lopez, including pertinent history and exam findings,  with the resident. I have seen and examined the patient and the key elements of all parts of the encounter have been performed by me. I agree with the assessment, plan and orders as documented by the resident with additions . Treatment plan Discussed with nursing staff in detail , all questions answered . Electronically signed by Crys Ordaz MD on   1/4/21 at 4:40 PM EST    Please note that this chart was generated using voice recognition Dragon dictation software. Although every effort was made to ensure the accuracy of this automated transcription, some errors in transcription may have occurred. Please note that this chart was generated using voice recognition Dragon dictation software. Although every effort was made to ensure the accuracy of this automated transcription, some errors in transcription may have occurred.

## 2021-01-04 NOTE — PROGRESS NOTES
[facility]  Office: [address], DO, Beaumont Jorge, DO, Edgardo Richard, DO, Kita Oviedo, DO, Gigi Soliman MD, Carlos Kent MD, Bianca Duke MD, Lorie Timmons MD, Sharyle Anon, MD, Giselle Greer MD, Connor Parada MD, Magalys Sethi MD, Leandra Manzano MD, Km Rodriguez, DO, Meryle Schuller, MD, Steff Nj MD, Zachariah Brown, DO, Bhavya Shay MD,  Briana Croft DO, Latrice Brown MD, Jacques Tejada MD, Mynor Navarro, [facility], [facility], CNP, Julieth Rodriguez, CNP, Laith Macias, CNS, Joanne Villatoro, CNP, Jacklyn Kapadia, CNP, Terryann Collet, CNP, Shaila Yanes, CNP, Prasad Samaniego, CNP, RA TrimbleC, Hiren Buckley, [facility], Deidra Maldonado, CNP, Mahad Watts, CNP, Larry Devi, CNP, Jacquie Vee, CNP, Baljit Snider, [address] [place] [address]    Progress Note    1/4/2021    11:23 AM    Name:   Po Agustin  MRN:     [id number]     [place]:      [de-identified]   Room:   [facility] Day:  10  Admit Date:  12/25/2020 11:29 AM    PCP:   Efrain Pastor MD  Code Status:  Full Code    Subjective:     C/C:   Chief Complaint   Patient presents with    Respiratory Distress     Interval History Status: not changed. Patient seen and examined  Patient feels weak patient is poor historian  Patient evaluated by cardiology for Arvilla Markell pending echo  Patient denies fever chest pain   I am seeing the patient for respiratory failure    Brief History: This is an 82 yr old male with AFib (chronically anticoagulated with warfarin), HTN, BPH, hypothyroidism, dementia who presents from [facility] ER with shortness of breath. At baseline, patient is oxygen dependent at [address] EMS was called from patient's nursing facility for increased shortness of breath. Pulse Ox on EMS arrival: 85% on 4L. Patient is a poor historian, thus most information is collected from EHR.   Patient recently discharged from our service on 12/18/2020 after treatment for pneumonia and supra therapeutic INR. Patient was discharged on PO doxycycline. CXR done at [facility] hospital read as worsening pneumonia. WBC elevated at 21.6, Trop: 34-->32, pro-bnp: 488, glucose: 114, HGB: 10.8, INR: 1.9, PT: 19.9, PTT: 37.4, D-Dimer: 1.04. CT Chest negative for PE and COVID-19 swab negative. Patient was given 10mg SC Vitamin K for reported INR: 19.9 (do not see this value represented) and was given 1 dose of IV zosyn and Cipro. Patient is transferred to our facility for admission and continued management of Pneumonia.      Medications: Allergies:  No Known Allergies    Current Meds:   Scheduled Meds:    warfarin  3 mg Oral Once    neomycin-bacitracin-polymyxin   Topical BID    enoxaparin  1 mg/kg Subcutaneous BID    ampicillin-sulbactam  3 g Intravenous Q6H    dextromethorphan-guaiFENesin  10 mL Oral 4 times per day    warfarin (COUMADIN) daily dosing (placeholder)   Other RX Placeholder    multivitamin  1 tablet Oral Daily    midodrine  5 mg Oral TID WC    sodium chloride flush  10 mL Intravenous 2 times per day    ipratropium-albuterol  1 ampule Inhalation Q4H WA    digoxin  125 mcg Oral Daily    dilTIAZem  120 mg Oral Daily    finasteride  5 mg Oral Daily    levothyroxine  75 mcg Oral Daily    tamsulosin  0.8 mg Oral Nightly     Continuous Infusions:   PRN Meds: diphenhydrAMINE, potassium chloride **OR** potassium alternative oral replacement **OR** potassium chloride, nicotine, promethazine **OR** ondansetron, magnesium hydroxide, acetaminophen **OR** acetaminophen, albuterol    Data:     Past Medical History:   has a past medical history of Atherosclerosis, Benign prostatic hyperplasia, Hypertension, Hypothyroidism, Osteoporosis, Paroxysmal A-fib ([address] Pneumonia, Rhabdomyolysis, Smoking, Spondylolisthesis, and Vitamin D deficiency. Social History:   reports that he has never smoked.  He has never used smokeless tobacco. He reports that he does not drink alcohol or use drugs. Family History: History reviewed. No pertinent family history. Vitals:  BP (!) 110/92   Pulse 78   Temp 98.1 °F (36.7 °C) (Oral)   Resp 18   Ht 6' 3\" (1.905 m)   Wt 183 lb 12.8 oz (83.4 kg)   SpO2 98%   BMI 22.97 kg/m²   Temp (24hrs), Av °F (36.7 °C), Min:97.7 °F (36.5 °C), Max:98.1 °F (36.7 °C)    No results for input(s): POCGLU in the last 72 hours. I/O (24Hr): Intake/Output Summary (Last 24 hours) at 2021 1123  Last data filed at 1/3/2021 2324  Gross per 24 hour   Intake 900 ml   Output 370 ml   Net 530 ml       Labs:  Hematology:  Recent Labs     21  0510 01/03/21  0621  0700   WBC 7.2 8.3 7.3   RBC 3.15* 3.06* 3.07*   HGB 9.1* 8.8* 8.8*   HCT 30.1* 28.9* 29.1*   MCV 95.6 94.4 94.8   MCH 28.9 28.8 28.7   MCHC 30.2 30.4 30.2   RDW 19.5* 19.5* 19.7*    205 222   MPV 9.9 10.2 10.5   INR 1.7 1.8 1.8     Chemistry:  Recent Labs     21  0510 01/03/21  0621  0700    136 141   K 3.6* 3.6* 3.8    99 103   CO2 27 27 30   GLUCOSE 89 91 87   BUN 12 13 12   CREATININE 0.70 0.76 0.82   MG 2.2 2.1 2.2   ANIONGAP 12 10 8*   LABGLOM >60 >60 >60   GFRAA >60 >60 >60   CALCIUM 8.4* 8.7 8.8   PHOS 2.8 3.2 3.1     Recent Labs     21  0510 01/03/21  0621  0700   LABALBU 2.2* 2.4* 2.4*     ABG:No results found for: POCPH, PHART, PH, POCPCO2, DIQ0YNS, PCO2, POCPO2, PO2ART, PO2, POCHCO3, MGZ6LNG, HCO3, NBEA, PBEA, BEART, BE, THGBART, THB, JNZ7DAJ, KZZV2OEU, Y8XIJFXA, O2SAT, FIO2  Lab Results   Component Value Date/Time    SPECIAL NOT REPORTED 2020 05:12 PM     Lab Results   Component Value Date/Time    CULTURE PRESUMPTIVE CANDIDA ALBICANS 10 to 50,000 CFU/ML (A) 2020 05:12 PM       Radiology:  Xr Chest (single View Frontal)    Result Date: 2020  Persistent left lower lobe opacification concerning for pneumonia. Interval improvement of right basilar infiltrate. Left perihilar atelectasis. Physical Examination:        General appearance:  Alert   Lungs: Bilateral crackles  Heart:  regular rate and rhythm, no murmur  Abdomen:  soft, nontender, nondistended, normal bowel sounds, no masses, hepatomegaly, splenomegaly  Extremities:  no edema, redness, tenderness in the calves  Skin:  no gross lesions, rashes, induration    Assessment:        Hospital Problems           Last Modified POA    * (Principal) Community acquired bacterial pneumonia 12/26/2020 Yes    Hypertension 12/25/2020 Yes    Hypothyroidism 12/25/2020 Yes    Benign prostatic hyperplasia 12/25/2020 Yes    Paroxysmal atrial fibrillation ([address] 12/25/2020 Yes    Chronic anticoagulation 12/25/2020 Yes    Lactic acidosis 12/26/2020 Yes    Sepsis ([address] 12/26/2020 Yes    Acute respiratory failure with hypoxia ([address] 12/27/2020 Yes    Moderate malnutrition ([address] 12/31/2020 Yes          Plan:        #1 pneumonia probably aspiration pneumonia associated with acute hypoxemic respiratory failure most likely patient was treated with oxygen IV antibiotics  ID recommendation appreciated plan for IV antibiotic until 1/5/2021 Unasyn  Patient will need repeat chest x-ray as outpatient in 4 weeks to confirm resolution    #2 aspiration speech therapy was consulted dental soft diet with nectar thick    #2 acute hypokalemia replaced    #3 abnormal UA positive for Candida most likely colonization      #5 paroxysmal A. fib patient had supratherapeutic INR was elevated with vitamin K currently INR is subtherapeutic currently patient on Coumadin and Lovenox  Continue Cardizem    #6 history of BPH continue Flomax  BladderScan every shift straight cath for urinary retention greater than 250    #7 anemia probably anemia of chronic disease age-appropriate cancer screening and work-up as outpatient transfuse if hemoglobin below 7    #8 episode of V. tach cardiology was consulted echo  Probably related to hypokalemia    #9 hypothyroidism levothyroxine          Mahmud Maliha Arana MD  1/4/2021  11:23 AM

## 2021-01-04 NOTE — PLAN OF CARE
Nutrition Problem #1: Moderate malnutrition, In context of acute illness or injury  Intervention: Food and/or Nutrient Delivery: Continue Current Diet, Continue Oral Nutrition Supplement  Nutritional Goals: Pt to meet % of est'd needs via PO

## 2021-01-04 NOTE — PROGRESS NOTES
Patient was recently discharged from hospital on 12/18/2020 after treatment for pneumonia and supra therapeutic INR. Patient was discharged on PO doxycycline. CXR done at outlying hospital read as worsening pneumonia. WBC elevated at 21.6>20.6, lactic acid 3.5,Trop: 32>30>29, pro-bnp: 488 D-Dimer: 1.04. CT Chest negative for PE and COVID-19 swab negative. Patient was given 1 dose of IV Zosyn and Cipro in the emergency room   Patient was transferred to [facility]. Vs for continued management of Pneumonia.      CURRENT EVALUATION 01/04/21    Afebrile  VS stable    98% O2 saturation on 4L    The patient seen and evaluated at bedside. Patient is better with no new acute issues or concerns today. Patient does not have any fevers, chills, chest pains or palpitations. Patient is awake and alert, but confused. States his breathing is improving and he has a mild non-productive cough. Mild intermittent crackles on chest auscultation. CXR 12/17/20  Subtle patchy bilateral lung opacities increased from the prior study are   likely on the basis of pneumonia     CXR 12/26/20  compatible with pneumonia and   some basilar atelectasis/consolidation. CT chest 12/25/20  Bilateral lower lobe consolidation and scattered bilateral ground-glass   nodular opacities are likely on the basis of pneumonia       Labs, X rays reviewed: 1/4/2021    BUN:25>17>18-->11-->12  Cr:1.16>0.85>0.72-->0.83-->0.82    WBC:21.6>20.6>16.8->14.3-->12.0-->9.2-->--8.6>7.3  Hb:10.8>9>8.5>9.5-->9.6-->9.5-->9.1-->8.8  Plat: 339>250>227>320-->286-->257-->222    Cultures:  Urine:  · Strept pneumonia antigen negative   · Legionella antigen negative    Blood: 12/25/20- no growth for 6 days  ·   Sputum :  ·   Wound:  ·     Discussed with patient, RN        I have personally reviewed the past medical history, past surgical history, medications, social history, and family history, and I have updated the database accordingly. Past Medical History:     Past Medical History:   Diagnosis Date    Atherosclerosis     Benign prostatic hyperplasia     Hypertension     Hypothyroidism     Osteoporosis     Paroxysmal A-fib ([address]     Pneumonia     Rhabdomyolysis     Smoking     Spondylolisthesis     Vitamin D deficiency        Past Surgical  History:     Past Surgical History:   Procedure Laterality Date    ANKLE SURGERY      HIP SURGERY      TONSILLECTOMY AND ADENOIDECTOMY         Medications:      neomycin-bacitracin-polymyxin   Topical BID    enoxaparin  1 mg/kg Subcutaneous BID    ampicillin-sulbactam  3 g Intravenous Q6H    dextromethorphan-guaiFENesin  10 mL Oral 4 times per day    warfarin (COUMADIN) daily dosing (placeholder)   Other RX Placeholder    multivitamin  1 tablet Oral Daily    midodrine  5 mg Oral TID WC    sodium chloride flush  10 mL Intravenous 2 times per day    ipratropium-albuterol  1 ampule Inhalation Q4H WA    digoxin  125 mcg Oral Daily    dilTIAZem  120 mg Oral Daily    finasteride  5 mg Oral Daily    levothyroxine  75 mcg Oral Daily    tamsulosin  0.8 mg Oral Nightly       Social History:     Social History     Socioeconomic History    Marital status:      Spouse name: Not on file    Number of children: Not on file    Years of education: Not on file    Highest education level: Not on file   Occupational History    Not on file   Social Needs    Financial resource strain: Not on file    Food insecurity     Worry: Not on file     Inability: Not on file   [employer] needs     Medical: Not on file     Non-medical: Not on file   Tobacco Use    Smoking status: Never Smoker    Smokeless tobacco: Never Used   Substance and Sexual Activity    Alcohol use: Never     Frequency: Never     Binge frequency: Never    Drug use: Never    Sexual activity: Not on file   Lifestyle    Physical activity     Days per week: Not on file     Minutes per session: Not on file  Stress: Not on file   Relationships    Social connections     Talks on phone: Not on file     Gets together: Not on file     Attends Evangelical service: Not on file     Active member of club or organization: Not on file     Attends meetings of clubs or organizations: Not on file     Relationship status: Not on file    Intimate partner violence     Fear of current or ex partner: Not on file     Emotionally abused: Not on file     Physically abused: Not on file     Forced sexual activity: Not on file   Other Topics Concern    Not on file   Social History Narrative    Not on file       Family History:   History reviewed. No pertinent family history. Allergies:   Patient has no known allergies. Review of Systems:   Unable to obtain much history from patient. Physical Examination :     Patient Vitals for the past 8 hrs:   BP Temp Temp src Pulse SpO2   01/04/21 0625 (!) 110/92 98.1 °F (36.7 °C) Oral 78 98 %     General Appearance: Awake, alert, confused (not oriented to location)  Head:  Normocephalic, no trauma  Eyes: Pupils equal, round, reactive to light and accommodation; extraocular movements intact; sclera anicteric; conjunctivae pink. No embolic phenomena. ENT: Oropharynx clear, without erythema, exudate, or thrush. No tenderness of sinuses. Mouth/throat: mucosa pink and moist. No lesions. Dentition in good repair. Neck:Supple, without lymphadenopathy. Thyroid normal, No bruits. Pulmonary/Chest: Clear to auscultation, has mild intermittent crackles on auscultation (improved compared to yesterday). No dullness to percussion. Cardiovascular: irregular rhythm without murmurs, rubs, or gallops. Abdomen: Soft, non tender. Bowel sounds normal. No organomegaly  All four Extremities: No cyanosis, clubbing, edema, or effusions. Neurologic: No gross sensory or motor deficits. Skin: Warm and dry with good turgor. No signs of peripheral arterial or venous insufficiency. No ulcerations. No open wounds. Medical Decision Making -Laboratory:   I have independently reviewed/ordered the following labs:    CBC with Differential:   Recent Labs     21  0605 21  0700   WBC 8.3 7.3   HGB 8.8* 8.8*   HCT 28.9* 29.1*    222   LYMPHOPCT 26 26   MONOPCT 5 4     BMP:   Recent Labs     21  0605 21  0700    141   K 3.6* 3.8   CL 99 103   CO2 27 30   BUN 13 12   CREATININE 0.76 0.82   MG 2.1 2.2     Hepatic Function Panel:   Recent Labs     21  0605 21  0700   LABALBU 2.4* 2.4*     No results for input(s): RPR in the last 72 hours. No results for input(s): HIV in the last 72 hours. No results for input(s): BC in the last 72 hours. Lab Results   Component Value Date    MUCUS NOT REPORTED 2020    RBC 3.07 2021    TRICHOMONAS NOT REPORTED 2020    WBC 7.3 2021    YEAST NOT REPORTED 2020    TURBIDITY CLEAR 2020     Lab Results   Component Value Date    CREATININE 0.82 2021    GLUCOSE 87 2021       Medical Decision Making-Imagin20    Medical Decision Qhtgyh-Easvmkkl-Pcsaf:       Medical Decision Making-Other:     Note:  · Labs, medications, radiologic studies were reviewed with personal review of films  · Large amounts of data were reviewed  · Discussed with nursing Staff, Discharge planner  · Infection Control and Prevention measures reviewed  · All prior entries were reviewed  · Administer medications as ordered  · Prognosis: Guarded  · Discharge planning reviewed  · Follow up as outpatient. Thank you for allowing us to participate in the care of this patient. Please call with questions.       Nikita Foster    ATTESTATION: I have discussed the case, including pertinent history and exam findings with the residents and students. I have seen and examined the patient and the key elements of the encounter have been performed by me. I was present when the student obtained his information or examined the patient. I have reviewed the laboratory data, other diagnostic studies and discussed them with the residents. I have updated the medical record where necessary. I agree with the assessment, plan and orders as documented by the resident/ student.     Alayna Howard MD.

## 2021-01-04 NOTE — PROGRESS NOTES
[facility] - Progress Note    Today's Date and Time: 1/4/2021, 12:21 PM    Impression :   · AFib (chronically anticoagulated with warfarin)  · HTN  · BPH  · Hypothyroidism  · Dementia   · Pressure wound on coccyx. · Aspiration pneumonia  · Single blood culture with Coagulase negative Staphylococci on 12/15/2020  · Covid tests:  · 12/14/2020 -negative  · 12/25/2020 negative    Recommendations:   · Unasyn 3 g IV q 6hr for aspiration. Stop date 1-5-21  · Monitor clinical response  · Wound care    Medical Decision Making/Summary/Discussion:1/4/2021     ·   Infection Control Recommendations   · [place] Precautions  · Contact Isolation MRSA    Antimicrobial Stewardship Recommendations     · Simplification of therapy    Coordination of Outpatient Care:   · Estimated Length of IV antimicrobials:TBD  · Patient will need Midline Catheter Insertion: No  · Patient will need PICC line Insertion:No  · Patient will need: Home IV , [place],  SNF,  LTAC:TBD  · Patient will need outpatient wound care:Yes    Chief complaint/reason for consultation:   · Pneumonia  · Sacral decubiti    History of Present Illness:   Ignacio Perez is a 82y.o.-year-old white male who was initially admitted on 12/25/2020. Patient seen at the request of Dr. Yady Kimball DO.    INITIAL HISTORY:    This is a patient with AFib (chronically anticoagulated with warfarin), HTN, BPH, hypothyroidism, dementia who [facility] with shortness of breath. Patient is a poor historian, thus most information is collected from EMR. EMS was called from patient's nursing facility because of increased shortness of breath. Patient uses 4 L oxygen at home,  he was saturating  85% on 4L. Patient was recently discharged from hospital on 12/18/2020 after treatment for pneumonia and supra therapeutic INR. Patient was discharged on PO doxycycline.   CXR done at outlying hospital read as worsening pneumonia. WBC elevated at 21.6>20.6, lactic acid 3.5,Trop: 32>30>29, pro-bnp: 488 D-Dimer: 1.04. CT Chest negative for PE and COVID-19 swab negative. Patient was given 1 dose of IV Zosyn and Cipro in the emergency room   Patient was transferred to [facility]. Vs for continued management of Pneumonia.      CURRENT EVALUATION 01/04/21    Afebrile  VS stable    RR 18  On 4 L NC  02 sat 98    The patient seen and evaluated at bedside. Remains confused  Patient is better with no new acute issues or concerns today. Patient does not have any fevers, chills, chest pains or palpitations. He reports some residual SOB and cough. Mild intermittent crackles on chest auscultation. CXR:   · 12/17/20: Subtle opacities from pneumonia  · 12/26/20 Pneumonia with basilar consolidation or atelectasis       CT chest 12/25/20  Bilateral lower lobe consolidation and scattered bilateral ground-glass   nodular opacities are likely on the basis of pneumonia       Labs, X rays reviewed: 1/4/2021    BUN:25>17>18-->11-->12  Cr:1.16>0.85>0.72-->0.83-->0.82    WBC: 12.0-->9.2-->--8.6>7.3  Hb: 9.5-->9.6-->9.5-->9.1-->8.8  Plat: 320-->286-->257-->222    Cultures:  Urine:  · Strept pneumonia antigen negative   · Legionella antigen negative    Blood:   · 12/25/20- no growth for 6 days  ·   Sputum :  ·   Wound:  ·     Discussed with patient, RN        I have personally reviewed the past medical history, past surgical history, medications, social history, and family history, and I have updated the database accordingly.   Past Medical History:     Past Medical History:   Diagnosis Date    Atherosclerosis     Benign prostatic hyperplasia     Hypertension     Hypothyroidism     Osteoporosis     Paroxysmal A-fib ([address]     Pneumonia     Rhabdomyolysis     Smoking     Spondylolisthesis     Vitamin D deficiency        Past Surgical  History:     Past Surgical History:   Procedure Laterality Date    ANKLE SURGERY      HIP SURGERY      TONSILLECTOMY AND ADENOIDECTOMY         Medications:      warfarin  3 mg Oral Once    potassium chloride  20 mEq Oral Once    neomycin-bacitracin-polymyxin   Topical BID    enoxaparin  1 mg/kg Subcutaneous BID    ampicillin-sulbactam  3 g Intravenous Q6H    dextromethorphan-guaiFENesin  10 mL Oral 4 times per day    warfarin (COUMADIN) daily dosing (placeholder)   Other RX Placeholder    multivitamin  1 tablet Oral Daily    midodrine  5 mg Oral TID WC    sodium chloride flush  10 mL Intravenous 2 times per day    ipratropium-albuterol  1 ampule Inhalation Q4H WA    digoxin  125 mcg Oral Daily    dilTIAZem  120 mg Oral Daily    finasteride  5 mg Oral Daily    levothyroxine  75 mcg Oral Daily    tamsulosin  0.8 mg Oral Nightly       Social History:     Social History     Socioeconomic History    Marital status:      Spouse name: Not on file    Number of children: Not on file    Years of education: Not on file    Highest education level: Not on file   Occupational History    Not on file   Social Needs    Financial resource strain: Not on file    Food insecurity     Worry: Not on file     Inability: Not on file    Transportation needs     Medical: Not on file     Non-medical: Not on file   Tobacco Use    Smoking status: Never Smoker    Smokeless tobacco: Never Used   Substance and Sexual Activity    Alcohol use: Never     Frequency: Never     Binge frequency: Never    Drug use: Never    Sexual activity: Not on file   Lifestyle    Physical activity     Days per week: Not on file     Minutes per session: Not on file    Stress: Not on file   Relationships    Social connections     Talks on phone: Not on file     Gets together: Not on file     Attends Pentecostalism service: Not on file     Active member of club or organization: Not on file     Attends meetings of clubs or organizations: Not on file     Relationship status: Not on file    Intimate partner violence     Fear of current or ex partner: Not on file     Emotionally abused: Not on file     Physically abused: Not on file     Forced sexual activity: Not on file   Other Topics Concern    Not on file   Social History Narrative    Not on file       Family History:   History reviewed. No pertinent family history. Allergies:   Patient has no known allergies. Review of Systems:   Unable to obtain much history from patient. Physical Examination :     Patient Vitals for the past 8 hrs:   BP Temp Temp src Pulse Resp SpO2   01/04/21 1013 -- -- -- -- 18 --   01/04/21 0625 (!) 110/92 98.1 °F (36.7 °C) Oral 78 -- 98 %     General Appearance: Awake, alert, confused (not oriented to location)  Head:  Normocephalic, no trauma  Eyes: Pupils equal, round, reactive to light and accommodation; extraocular movements intact; sclera anicteric; conjunctivae pink. No embolic phenomena. ENT: Oropharynx clear, without erythema, exudate, or thrush. No tenderness of sinuses. Mouth/throat: mucosa pink and moist. No lesions. Dentition in good repair. Neck:Supple, without lymphadenopathy. Thyroid normal, No bruits. Pulmonary/Chest: Clear to auscultation, has mild intermittent crackles on auscultation (improved compared to yesterday). No dullness to percussion. Cardiovascular: irregular rhythm without murmurs, rubs, or gallops. Abdomen: Soft, non tender. Bowel sounds normal. No organomegaly  All four Extremities: No cyanosis, clubbing, edema, or effusions. Neurologic: No gross sensory or motor deficits. Skin: Warm and dry with good turgor. No signs of peripheral arterial or venous insufficiency. No ulcerations. No open wounds.     Medical Decision Making -Laboratory:   I have independently reviewed/ordered the following labs:    CBC with Differential:   Recent Labs     01/03/21  0605 01/04/21  0700   WBC 8.3 7.3   HGB 8.8* 8.8*   HCT 28.9* 29.1*    222   LYMPHOPCT 26 26   MONOPCT 5 4     BMP:   Recent Labs     01/03/21  0605 01/04/21  0700   NA 136 141   K 3.6* 3.8   CL 99 103   CO2 27 30   BUN 13 12   CREATININE 0.76 0.82   MG 2.1 2.2     Hepatic Function Panel:   Recent Labs     21  0605 21  0700   LABALBU 2.4* 2.4*     No results for input(s): RPR in the last 72 hours. No results for input(s): HIV in the last 72 hours. No results for input(s): BC in the last 72 hours. Lab Results   Component Value Date    MUCUS NOT REPORTED 2020    RBC 3.07 2021    TRICHOMONAS NOT REPORTED 2020    WBC 7.3 2021    YEAST NOT REPORTED 2020    TURBIDITY CLEAR 2020     Lab Results   Component Value Date    CREATININE 0.82 2021    GLUCOSE 87 2021       Medical Decision Making-Imagin20    Medical Decision Hhkwar-Gwfoqfgh-Ecsdy:       Medical Decision Making-Other:     Note:  · Labs, medications, radiologic studies were reviewed with personal review of films  · Large amounts of data were reviewed  · Discussed with nursing Staff, Discharge planner  · Infection Control and Prevention measures reviewed  · All prior entries were reviewed  · Administer medications as ordered  · Prognosis: Guarded  · Discharge planning reviewed  · Follow up as outpatient. Thank you for allowing us to participate in the care of this patient. Please call with questions. Marcus Shaikh, MS4 participated in the evaluation of this patient under my direct supervision.       Denys Klinefelter, MD

## 2021-01-05 NOTE — PROGRESS NOTES
treatment for pneumonia and supra therapeutic INR. Patient was discharged on PO doxycycline. [facility]. WBC elevated at 21.6, Trop: 34-->32, pro-bnp: 488, glucose: 114, HGB: 10.8, INR: 1.9, PT: 19.9, PTT: 37.4, D-Dimer: 1.04. CT Chest negative for PE and COVID-19 swab negative. Patient was given 10mg SC Vitamin K for reported INR: 19.9 (do not see this value represented) and was given 1 dose of IV zosyn and Cipro. Patient is transferred to our facility for admission and continued management of Pneumonia.      Medications: Allergies:  No Known Allergies    Current Meds:   Scheduled Meds:    warfarin  5 mg Oral Once    potassium chloride  20 mEq Oral Once    neomycin-bacitracin-polymyxin   Topical BID    enoxaparin  1 mg/kg Subcutaneous BID    ampicillin-sulbactam  3 g Intravenous Q6H    dextromethorphan-guaiFENesin  10 mL Oral 4 times per day    warfarin (COUMADIN) daily dosing (placeholder)   Other RX Placeholder    multivitamin  1 tablet Oral Daily    midodrine  5 mg Oral TID     sodium chloride flush  10 mL Intravenous 2 times per day    ipratropium-albuterol  1 ampule Inhalation Q4H WA    digoxin  125 mcg Oral Daily    dilTIAZem  120 mg Oral Daily    finasteride  5 mg Oral Daily    levothyroxine  75 mcg Oral Daily    tamsulosin  0.8 mg Oral Nightly     Continuous Infusions:   PRN Meds: diphenhydrAMINE, potassium chloride **OR** potassium alternative oral replacement **OR** potassium chloride, nicotine, promethazine **OR** ondansetron, magnesium hydroxide, acetaminophen **OR** acetaminophen, albuterol    Data:     Past Medical History:   has a past medical history of Atherosclerosis, Benign prostatic hyperplasia, Hypertension, Hypothyroidism, Osteoporosis, Paroxysmal A-fib ([address] Pneumonia, Rhabdomyolysis, Smoking, Spondylolisthesis, and Vitamin D deficiency. Social History:   reports that he has never smoked.  He has never used smokeless tobacco. He reports that he does not drink alcohol or use drugs. Family History: History reviewed. No pertinent family history. Vitals:  /61   Pulse 71   Temp 98 °F (36.7 °C) (Oral)   Resp 21   Ht 6' 3\" (1.905 m)   Wt 183 lb 12.8 oz (83.4 kg)   SpO2 93%   BMI 22.97 kg/m²   Temp (24hrs), Av.9 °F (36.6 °C), Min:97.5 °F (36.4 °C), Max:98.2 °F (36.8 °C)    Recent Labs     21  0636   POCGLU 93       I/O (24Hr): No intake or output data in the 24 hours ending 21 1039    Labs:  Hematology:  Recent Labs     21  0721  0546   WBC 8.3 7.3 9.4   RBC 3.06* 3.07* 3.05*   HGB 8.8* 8.8* 8.7*   HCT 28.9* 29.1* 29.4*   MCV 94.4 94.8 96.4   MCH 28.8 28.7 28.5   MCHC 30.4 30.2 29.6   RDW 19.5* 19.7* 19.9*    222 236   MPV 10.2 10.5 10.3   INR 1.8 1.8 1.5     Chemistry:  Recent Labs     21  0621  0721  0546    141 137   K 3.6* 3.8 4.0   CL 99 103 102   CO2 27 30 27   GLUCOSE 91 87 91   BUN 13 12 16   CREATININE 0.76 0.82 0.92   MG 2.1 2.2 2.3   ANIONGAP 10 8* 8*   LABGLOM >60 >60 >60   GFRAA >60 >60 >60   CALCIUM 8.7 8.8 8.7   PHOS 3.2 3.1 3.1     Recent Labs     21  0621  0721  0546 21  0636   LABALBU 2.4* 2.4* 2.3*  --    POCGLU  --   --   --  93     ABG:No results found for: POCPH, PHART, PH, POCPCO2, GCN2SDX, PCO2, POCPO2, PO2ART, PO2, POCHCO3, JWT1GSX, HCO3, NBEA, PBEA, BEART, BE, THGBART, THB, MBW5AAP, VNSO3UXI, J7TPKXBL, O2SAT, FIO2  Lab Results   Component Value Date/Time    SPECIAL NOT REPORTED 2020 05:12 PM     Lab Results   Component Value Date/Time    CULTURE PRESUMPTIVE CANDIDA ALBICANS 10 to 50,000 CFU/ML (A) 2020 05:12 PM       Radiology:  Xr Chest (single View Frontal)    Result Date: 2020  Persistent left lower lobe opacification concerning for pneumonia. Interval improvement of right basilar infiltrate. Left perihilar atelectasis.        Physical Examination: General appearance:  Alert   Lungs: Bilateral crackles  Heart:  regular rate and rhythm, no murmur  Abdomen:  soft, nontender, nondistended, normal bowel sounds, no masses, hepatomegaly, splenomegaly  Extremities:  no edema, redness, tenderness in the calves  Skin:  no gross lesions, rashes, induration    Assessment:        Hospital Problems           Last Modified POA    * (Principal) Community acquired bacterial pneumonia 12/26/2020 Yes    Hypertension 12/25/2020 Yes    Hypothyroidism 12/25/2020 Yes    Benign prostatic hyperplasia 12/25/2020 Yes    Paroxysmal atrial fibrillation ([address] 12/25/2020 Yes    Chronic anticoagulation 12/25/2020 Yes    Lactic acidosis 12/26/2020 Yes    Sepsis ([address] 12/26/2020 Yes    Acute respiratory failure with hypoxia ([address] 12/27/2020 Yes    Moderate malnutrition ([address] 12/31/2020 Yes          Plan:        #1 pneumonia probably aspiration pneumonia associated with acute hypoxemic respiratory failure most likely patient was treated with oxygen IV antibiotics  ID recommendation appreciated plan for IV antibiotic until 1/5/2021 Unasyn  Patient still short of breath complaining of cough on oxygen will get chest x-ray    #2 aspiration speech therapy was consulted dental soft diet with nectar thick    #2 acute hypokalemia replaced    #3 abnormal UA positive for Candida most likely colonization      #5 paroxysmal A. fib patient had supratherapeutic INR was elevated with vitamin K currently INR is subtherapeutic currently patient on Coumadin and Lovenox  Continue Cardizem  Lab Results   Component Value Date    INR 1.5 01/05/2021    INR 1.8 01/04/2021    INR 1.8 01/03/2021    PROTIME 15.8 (H) 01/05/2021    PROTIME 18.3 (H) 01/04/2021    PROTIME 18.0 (H) 01/03/2021       #6 history of BPH continue Flomax  BladderScan every shift straight cath for urinary retention greater than 250    #7 anemia probably anemia of chronic disease age-appropriate cancer screening and work-up as outpatient transfuse if hemoglobin below 7    #8 episode of V. tach cardiology was consulted echo  Probably related to hypokalemia    #9 hypothyroidism levothyroxine      #10 anticipate discharge to rehab in the morning          Hakeem Mace MD  1/5/2021  10:39 AM

## 2021-01-05 NOTE — CARE COORDINATION
Transitional Planning  Referral sent to [facility] per dtr request called spoke with Lamont Claros. Need Covid 24-48 prior to admit  Need clarification when tele sitter removed  Sitter removed yesterday 1/4 2:51pm    Faxed updated insurance card to [address] [place] [address]  As [employer] has changed. Faxed updated information about sitter removed yesterday. 16:15  Called [facility] they would like to review again tomorrow his therapy notes before they can accept as pt has been declining therapy.   Called pt's dtr Jacinda Reyna at [phone] and let her know I need more choices she will look at list and provide them in am.

## 2021-01-05 NOTE — PROGRESS NOTES
Disaster charting initiated at 0700. Notified team about lack of IV access and no availability to receive alternate line at this time.

## 2021-01-05 NOTE — FLOWSHEET NOTE
DATE: 2021    NAME: Ortega Farley  MRN: [id number]   : 1935    Patient not seen this date for Physical Therapy due to:  [] Blood transfusion in progress  [] Hemodialysis  [x] Patient Declined: Pt initially agreed to PT this morning, when writer attempted to take blankets off pt he adamantly refused therapy and stated he does not want to get better. Pt very confused. [] Spine Precautions   [] Strict Bedrest  [] Surgery/ Procedure  [] Testing      [] Other        [] PT is being discontinued at this time. Patient independent. No further needs. [] PT is being discontinued at this time due to declining physical/ medical status. Therapy is not appropriate at this time.     Mega Arora, PTA

## 2021-01-05 NOTE — PROGRESS NOTES
WBC elevated at 21.6>20.6, lactic acid 3.5,Trop: 32>30>29, pro-bnp: 488 D-Dimer: 1.04. CT Chest negative for PE and COVID-19 swab negative. Patient was given 1 dose of IV Zosyn and Cipro in the emergency room   Patient was transferred to [facility]. Vs for continued management of Pneumonia.      CURRENT EVALUATION 01/05/21    Afebrile  VS stable    RR 18  On 2 L NC  02 sat 97    The patient seen and evaluated at bedside. Patient is better with no new acute issues or concerns today. Patient does not have any fevers, chills, cough, shortness of breath, chest pains or palpitations. He remains somewhat confused    He reports some SOB and cough. Expiratory wheezing on lung auscultation. 7 day course of unasyn completed 1/5/21    CXR:   · 12/17/20: Subtle opacities from pneumonia  · 12/26/20 Pneumonia with basilar consolidation or atelectasis       CT chest 12/25/20  Bilateral lower lobe consolidation and scattered bilateral ground-glass   nodular opacities are likely on the basis of pneumonia       Labs, X rays reviewed: 1/5/2021    BUN:25>17>18-->11-->12-->16  Cr:1.16>0.85>0.72-->0.83-->0.82-->0.92    WBC: 12.0-->9.2-->--8.6>7.3-->9.4  Hb: 9.5-->9.6-->9.5-->9.1-->8.8-->8.7  Plat: 320-->286-->257-->222-->236    Cultures:  Urine:  · Strept pneumonia antigen negative   · Legionella antigen negative    Blood:   · 12/25/20- no growth for 6 days  ·   Sputum :  ·   Wound:  ·     Discussed with patient, RN        I have personally reviewed the past medical history, past surgical history, medications, social history, and family history, and I have updated the database accordingly.   Past Medical History:     Past Medical History:   Diagnosis Date    Atherosclerosis     Benign prostatic hyperplasia     Hypertension     Hypothyroidism     Osteoporosis     Paroxysmal A-fib ([address]     Pneumonia     Rhabdomyolysis     Smoking     Spondylolisthesis     Vitamin D deficiency        Past Surgical  History:     Past Surgical History:   Procedure Laterality Date    ANKLE SURGERY      HIP SURGERY      TONSILLECTOMY AND ADENOIDECTOMY         Medications:      potassium chloride  20 mEq Oral Once    neomycin-bacitracin-polymyxin   Topical BID    enoxaparin  1 mg/kg Subcutaneous BID    ampicillin-sulbactam  3 g Intravenous Q6H    dextromethorphan-guaiFENesin  10 mL Oral 4 times per day    warfarin (COUMADIN) daily dosing (placeholder)   Other RX Placeholder    multivitamin  1 tablet Oral Daily    midodrine  5 mg Oral TID WC    sodium chloride flush  10 mL Intravenous 2 times per day    ipratropium-albuterol  1 ampule Inhalation Q4H WA    digoxin  125 mcg Oral Daily    dilTIAZem  120 mg Oral Daily    finasteride  5 mg Oral Daily    levothyroxine  75 mcg Oral Daily    tamsulosin  0.8 mg Oral Nightly       Social History:     Social History     Socioeconomic History    Marital status:      Spouse name: Not on file    Number of children: Not on file    Years of education: Not on file    Highest education level: Not on file   Occupational History    Not on file   Social Needs    Financial resource strain: Not on file    Food insecurity     Worry: Not on file     Inability: Not on file    Transportation needs     Medical: Not on file     Non-medical: Not on file   Tobacco Use    Smoking status: Never Smoker    Smokeless tobacco: Never Used   Substance and Sexual Activity    Alcohol use: Never     Frequency: Never     Binge frequency: Never    Drug use: Never    Sexual activity: Not on file   Lifestyle    Physical activity     Days per week: Not on file     Minutes per session: Not on file    Stress: Not on file   Relationships    Social connections     Talks on phone: Not on file     Gets together: Not on file     Attends Catholic service: Not on file     Active member of club or organization: Not on file     Attends meetings of clubs or organizations: Not on file     Relationship status: Not on file   [place] Intimate partner violence     Fear of current or ex partner: Not on file     Emotionally abused: Not on file     Physically abused: Not on file     Forced sexual activity: Not on file   Other Topics Concern    Not on file   Social History Narrative    Not on file       Family History:   History reviewed. No pertinent family history. Allergies:   Patient has no known allergies. Review of Systems:   Unable to obtain much history from patient. Physical Examination :     Patient Vitals for the past 8 hrs:   BP Temp Temp src Pulse Resp SpO2   01/05/21 1615 (!) 99/49 98 °F (36.7 °C) Oral 68 16 95 %   01/05/21 1530 -- -- -- 70 -- 93 %   01/05/21 1515 -- -- -- 72 -- 93 %   01/05/21 1510 -- -- -- 72 -- 93 %   01/05/21 1123 117/64 98.2 °F (36.8 °C) Oral 65 -- 98 %   01/05/21 1040 -- -- -- 64 -- --     General Appearance: Awake, alert, confused (not oriented to location)  Head:  Normocephalic, no trauma  Eyes: Pupils equal, round, reactive to light and accommodation; extraocular movements intact; sclera anicteric; conjunctivae pink. No embolic phenomena. ENT: Oropharynx clear, without erythema, exudate, or thrush. No tenderness of sinuses. Mouth/throat: mucosa pink and moist. No lesions. Dentition in good repair. Neck:Supple, without lymphadenopathy. Thyroid normal, No bruits. Pulmonary/Chest: expiratory wheezing. No dullness to percussion. Cardiovascular: irregular rhythm without murmurs, rubs, or gallops. Abdomen: Soft, non tender. Bowel sounds normal. No organomegaly  All four Extremities: No cyanosis, clubbing, edema, or effusions. Neurologic: No gross sensory or motor deficits. Skin: Warm and dry with good turgor. No signs of peripheral arterial or venous insufficiency. No ulcerations. No open wounds.     Medical Decision Making -Laboratory:   I have independently reviewed/ordered the following labs:    CBC with Differential:   Recent Labs     01/04/21  0700 01/05/21  0546   WBC 7.3 9.4   HGB 8.8* 8.7* HCT 29.1* 29.4*    236   LYMPHOPCT 26 24   MONOPCT 4 4     BMP:   Recent Labs     21  0700 21  0546    137   K 3.8 4.0    102   CO2 30 27   BUN 12 16   CREATININE 0.82 0.92   MG 2.2 2.3     Hepatic Function Panel:   Recent Labs     21  0700 21  0546   LABALBU 2.4* 2.3*     No results for input(s): RPR in the last 72 hours. No results for input(s): HIV in the last 72 hours. No results for input(s): BC in the last 72 hours. Lab Results   Component Value Date    MUCUS NOT REPORTED 2020    RBC 3.05 2021    TRICHOMONAS NOT REPORTED 2020    WBC 9.4 2021    YEAST NOT REPORTED 2020    TURBIDITY CLEAR 2020     Lab Results   Component Value Date    CREATININE 0.92 2021    GLUCOSE 91 2021       Medical Decision Making-Imagin20    Medical Decision [id number]:       Medical Decision Making-Other:     Note:  · Labs, medications, radiologic studies were reviewed with personal review of films  · Large amounts of data were reviewed  · Discussed with nursing Staff, Discharge planner  · Infection Control and Prevention measures reviewed  · All prior entries were reviewed  · Administer medications as ordered  · Prognosis: Guarded  · Discharge planning reviewed  · Follow up as outpatient. Thank you for allowing us to participate in the care of this patient. Please call with questions. Timmy Bunch, MS4 participated in the evaluation of this patient under my direct supervision.     Arlette Greer MD

## 2021-01-05 NOTE — PLAN OF CARE
Problem: Pain:  Goal: Pain level will decrease  Description: Pain level will decrease  Outcome: Ongoing  Goal: Control of acute pain  Description: Control of acute pain  Outcome: Ongoing  Goal: Control of chronic pain  Description: Control of chronic pain  Outcome: Ongoing     Problem: Falls - Risk of:  Goal: Will remain free from falls  Description: Will remain free from falls  Outcome: Ongoing  Goal: Absence of physical injury  Description: Absence of physical injury  Outcome: Ongoing     Problem: Nutrition  Goal: Optimal nutrition therapy  Description: Nutrition Problem #1: Moderate malnutrition, In context of acute illness or injury  Intervention: Food and/or Nutrient Delivery: Continue Current Diet, Start Oral Nutrition Supplement  Nutritional Goals: Pt to meet % of est'd needs via PO     1/4/2021 1948 by Mechele Boeck, RN  Outcome: Ongoing  1/4/2021 1429 by Bryn Curtis RD, LD  Note: Nutrition Problem #1: Moderate malnutrition, In context of acute illness or injury  Intervention: Food and/or Nutrient Delivery: Continue Current Diet, Continue Oral Nutrition Supplement  Nutritional Goals: Pt to meet % of est'd needs via PO      Problem: Skin Integrity:  Goal: Will show no infection signs and symptoms  Description: Will show no infection signs and symptoms  Outcome: Ongoing  Goal: Absence of new skin breakdown  Description: Absence of new skin breakdown  Outcome: Ongoing

## 2021-01-05 NOTE — PROGRESS NOTES
[facility]  Progress Note                   Date:   1/5/2021  Patient name: Kaylee Amador  Date of admission:  12/25/2020 11:29 AM  MRN:   [id number]  YOB: 1935  PCP: Sylvester Chavez MD    Reason for Admission: Pneumonia [J18.9]    Subjective:       Clinical Changes /Abnormalities: Seen & examined in room with RN. Denies any chest pain or SOB presently. States when he does get some he has \"just a little pressure. \" Tele reviewed- no further NSVT noted. Labs and vitals reviewed. Presently RN trying to get new IV access. Review of Systems    Medications:   Scheduled Meds:   warfarin  5 mg Oral Once    potassium chloride  20 mEq Oral Once    neomycin-bacitracin-polymyxin   Topical BID    enoxaparin  1 mg/kg Subcutaneous BID    ampicillin-sulbactam  3 g Intravenous Q6H    dextromethorphan-guaiFENesin  10 mL Oral 4 times per day    warfarin (COUMADIN) daily dosing (placeholder)   Other RX Placeholder    multivitamin  1 tablet Oral Daily    midodrine  5 mg Oral TID WC    sodium chloride flush  10 mL Intravenous 2 times per day    ipratropium-albuterol  1 ampule Inhalation Q4H WA    digoxin  125 mcg Oral Daily    dilTIAZem  120 mg Oral Daily    finasteride  5 mg Oral Daily    levothyroxine  75 mcg Oral Daily    tamsulosin  0.8 mg Oral Nightly     Continuous Infusions:  CBC:   Recent Labs     01/03/21  0605 01/04/21  0700 01/05/21  0546   WBC 8.3 7.3 9.4   HGB 8.8* 8.8* 8.7*    222 236     BMP:    Recent Labs     01/03/21  0605 01/04/21  0700 01/05/21  0546    141 137   K 3.6* 3.8 4.0   CL 99 103 102   CO2 27 30 27   BUN 13 12 16   CREATININE 0.76 0.82 0.92   GLUCOSE 91 87 91     Hepatic:No results for input(s): AST, ALT, ALB, BILITOT, ALKPHOS in the last 72 hours. Troponin: No results for input(s): TROPHS in the last 72 hours. BNP: No results for input(s): BNP in the last 72 hours. Lipids: No results for input(s): CHOL, HDL in the last 72 hours.     Invalid input(s): LDLCALCU  INR:   Recent Labs     01/03/21  0605 01/04/21  0700 01/05/21  0546   INR 1.8 1.8 1.5       Objective:   Vitals: /64   Pulse 65   Temp 98.2 °F (36.8 °C) (Oral)   Resp 16   Ht 6' 3\" (1.905 m)   Wt 183 lb 12.8 oz (83.4 kg)   SpO2 98%   BMI 22.97 kg/m²   General appearance: alert and cooperative with exam  HEENT: Head: Normocephalic, no lesions, without obvious abnormality. Neck:no JVD, trachea midline, no adenopathy  Lungs: Clear to auscultation, diminished, upper lobe rhonchi, no rales  Heart: regular rate and rhythm, s1/s2 auscultated, no murmurs SB   Abdomen: soft, non-tender, bowel sounds active  Extremities: no edema  Neurologic: not done        Assessment / Acute Cardiac Problems:   1. NSVT  2. PAF  3. HTn  4. CAD  5. PNA  6. Chronic resp failure on NC oxygen at home  7. Hypokalemia    Patient Active Problem List:     Elevated INR     Age-related osteoporosis with current pathological fracture     Atherosclerotic peripheral vascular disease (HCC)     Closed nondisplaced longitudinal fracture of right patella     Pathological compression fracture of lumbar vertebra (HCC)     Hypertension     Hypothyroidism     Benign prostatic hyperplasia     Paroxysmal atrial fibrillation (HCC)     Vitamin D deficiency     Chronic respiratory failure with hypoxia, on home oxygen therapy ([address]     Pneumonia due to infectious organism     Coagulopathy ([address]     Community acquired bacterial pneumonia     Chronic anticoagulation     Lactic acidosis     Sepsis ([address]     Acute respiratory failure with hypoxia (HCC)     Moderate malnutrition ([address]      Plan of Treatment:   1. Awaiting echo. Further work-up pending findings. Spoke with lab and will be done today. 2. SB currently . No further NSVT noted. Continue to monitor. Continue PO Cardizem & dig. Coumadin  3. Keep K >4 and Mg >2.     Electronically signed by VAIBHAV Fernandez CNP on 1/5/2021 at 12:13 PM  [facility]

## 2021-01-05 NOTE — PROGRESS NOTES
PULMONARY & CRITICAL CARE MEDICINE PROGRESS  NOTE     Patient:  Fabio Wayne  MRN: [id number]  Admit date: 12/25/2020  Primary Care Physician: Tico Hwang MD  Consulting Physician: Genesis Ng MD  CODE Status: Full Code  LOS: 11    SUBJECTIVE     I personally interviewed/examined the patient, reviewed interval history and interpreted all available radiographic, laboratory data at the time of service. Chief Compliant/Reason for Initial Consult: Pneumonia    Brief Hospital Course: The patient is a 82 y.o. male with multiple medical comorbidities including hypertension, hypothyroidism, atrial fibrillation, dementia resented to [facility] ER with worsening shortness of breath. He was hypoxemic on presentation. He was recently treated for pneumonia and supratherapeutic INR and was discharged on oral doxycycline. Chest x-ray showed worsening pulmonary infiltrates patient had leukocytosis. CT chest was negative for PE. His Covid test was negative. He was on cefepime, which has been changed to Unasyn. Interval History:  01/05/21  Patient seen and examined bedside. Labs and chart reviewed. No acute overnight events. Awake, alert and oriented x3. Lying comfortably in bed. Continues to complain of mild shortness of breath. Patient is currently maintaining saturations on nasal cannula 3-4 L. Denies any new complaints. No leukocytosis. Afebrile. Patient was on home oxygen 4 L. Hemodynamically stable. Working with PT. Completed course of Unasyn today for aspiration pneumonia. Continuing on therapeutic dose Lovenox for A. fib. Started bridging with warfarin per primary.     Review of Systems:  Review of Systems   Unable to perform ROS: Dementia     OBJECTIVE     VITAL SIGNS:   LAST-  /64   Pulse 65   Temp 98.2 °F (36.8 °C) (Oral)   Resp 16   Ht 6' 3\" (1.905 m)   Wt 183 lb 12.8 oz (83.4 kg)   SpO2 98%   BMI 22.97 kg/m²   8-24 HR RANGE-  TEMP Temp  Av °F (36.7 °C)  Min: 97.5 °F (36.4 °C)  Max: 98.2 °F (89.7 °C)   BP Systolic (87VNA), GDS:005 , Min:112 , LYP:467      Diastolic (44WAH), PCS:62, Min:61, Max:64     PULSE Pulse  Av.5  Min: 64  Max: 83   RR Resp  Av  Min: 16  Max: 16   O2 SAT SpO2  Av.8 %  Min: 93 %  Max: 98 %   OXYGEN DELIVERY O2 Flow Rate (L/min)  Avg: 3 L/min  Min: 3 L/min  Max: 3 L/min     Systemic Examination:   General appearance - looks comfortable and in no acute distress  Eyes - pupils equal and reactive, extraocular eye movements intact  Mouth - mucous membranes moist, pharynx normal without lesions  Neck - supple, no significant adenopathy  Chest -breath sounds diminished at bases, occasional crackles  Heart - normal rate, regular rhythm, normal S1, S2, no murmurs, rubs, clicks or gallops  Abdomen - soft, nontender, nondistended, no masses or organomegaly  Neurological -awake and alert, slightly confused, no focal findings or movement disorder noted  Extremities - peripheral pulses normal, no pedal edema, no clubbing or cyanosis  Skin - normal coloration and turgor, no rashes, no suspicious skin lesions noted     DATA REVIEW     Medications:  Scheduled Meds:   warfarin  5 mg Oral Once    potassium chloride  20 mEq Oral Once    neomycin-bacitracin-polymyxin   Topical BID    enoxaparin  1 mg/kg Subcutaneous BID    ampicillin-sulbactam  3 g Intravenous Q6H    dextromethorphan-guaiFENesin  10 mL Oral 4 times per day    warfarin (COUMADIN) daily dosing (placeholder)   Other RX Placeholder    multivitamin  1 tablet Oral Daily    midodrine  5 mg Oral TID WC    sodium chloride flush  10 mL Intravenous 2 times per day    ipratropium-albuterol  1 ampule Inhalation Q4H WA    digoxin  125 mcg Oral Daily    dilTIAZem  120 mg Oral Daily    finasteride  5 mg Oral Daily    levothyroxine  75 mcg Oral Daily    tamsulosin  0.8 mg Oral Nightly     Continuous Infusions:    LABS:-  ABG: No results for input(s): POCPH, POCPCO2, POCPO2, POCHCO3, KVPA1XVE in the last 72 hours. CBC:   Recent Labs     01/03/21  0605 01/04/21  0700 01/05/21  0546   WBC 8.3 7.3 9.4   HGB 8.8* 8.8* 8.7*   HCT 28.9* 29.1* 29.4*   MCV 94.4 94.8 96.4    222 236   LYMPHOPCT 26 26 24   RBC 3.06* 3.07* 3.05*   MCH 28.8 28.7 28.5   MCHC 30.4 30.2 29.6   RDW 19.5* 19.7* 19.9*     BMP:   Recent Labs     01/03/21  0605 01/04/21  0700 01/05/21  0546    141 137   K 3.6* 3.8 4.0   CL 99 103 102   CO2 27 30 27   BUN 13 12 16   CREATININE 0.76 0.82 0.92   GLUCOSE 91 87 91   PHOS 3.2 3.1 3.1     Liver Function Test:   Recent Labs     01/05/21  0546   LABALBU 2.3*     Amylase/Lipase:  No results for input(s): AMYLASE, LIPASE in the last 72 hours. Coagulation Profile:   Recent Labs     01/03/21  0605 01/04/21  0700 01/05/21  0546   INR 1.8 1.8 1.5   PROTIME 18.0* 18.3* 15.8*     Cardiac Enzymes:  No results for input(s): CKTOTAL, CKMB, CKMBINDEX, TROPONINI in the last 72 hours. Lactic Acid:  No results found for: LACTA  BNP:   No results found for: BNP  D-Dimer:  Lab Results   Component Value Date    DDIMER 1.04 12/25/2020     Others:   No results found for: TSH, I3NHHIX, P6NFCAA, THYROIDAB, FT3, T4FREE  No results found for: BC, RHEUMFACTOR, SEDRATE, CRP  No results found for: Brian Arturo  Lab Results   Component Value Date    IRON 25 (L) 12/28/2020    TIBC 111 (L) 12/28/2020    FERRITIN 837 (H) 12/28/2020     No results found for: SPEP, UPEP  No results found for: PSA, CEA, , DA1894,     Input/Output:  No intake or output data in the 24 hours ending 01/05/21 1317    Microbiology:  No results for input(s): SPECDESC, SPECDESC, SPECIAL, CULTURE, CULTURE, STATUS, ORG, CDIFFTOXPCR, CAMPYLOBPCR, SALMONELLAPC, SHIGAPCR, SHIGELLAPCR, MPNEUG, MPNEUM, LACTOQL in the last 72 hours.     Pathology:    Radiology reports:  XR CHEST (SINGLE VIEW FRONTAL)   Final Result   Persistent left lower lobe opacification concerning for pneumonia. Interval   improvement of right basilar infiltrate. Left perihilar atelectasis. XR CHEST PORTABLE   Final Result   Persistent findings of aspiration or pneumonia near the bases, appearing   worsened in the right base and decreased in the lingula. XR CHEST (2 VW)   Final Result   Similar findings to those seen on recent CT, compatible with pneumonia and   some basilar atelectasis/consolidation. CT CHEST PULMONARY EMBOLISM W CONTRAST   Final Result   No central or segmental pulmonary embolus. Bilateral lower lobe consolidation and scattered bilateral ground-glass   nodular opacities are likely on the basis of pneumonia. Recommend follow-up. XR CHEST PORTABLE   Final Result   Subtle patchy bilateral lung opacities increased from the prior study are   likely on the basis of pneumonia. XR CHEST PORTABLE    (Results Pending)       Echocardiogram:   No results found for this or any previous visit. Cardiac Catheterization:   No results found for this or any previous visit. ASSESSMENT AND PLAN     Assessment:    // Acute hypoxemic respiratory failure, improving  // Bilateral multifocal pneumonia  // Paroxysmal atrial fibrillation  // Dementia  // Essential hypertension  // BPH  // Hypothyroidism    Plan:     Patient remains hemodynamically stable   He has been weaned off high flow nasal cannula, currently at 4 L/min which is his home oxygen.  Continue supplemental oxygen to keep oxygen saturation greater than 92%   Agree with anticoagulation with Lovenox and bridging to warfarin. Management of anticoagulation for A. fib per primary.  Completed course of Unasyn today for aspiration pneumonia. Monitor off antibiotics.  Okay to be discharged from pulmonary standpoint. It was my pleasure to evaluate Leyla Baugh today. We will continue to follow. I would like to thank you for allowing me to participate in the care of this patient. Please feel free to call with any further questions or concerns. Asha Rao M.D. Internal medicine resident, PGY 2  Pulmonary and Critical Care Medicine           1/5/2021   Attending Physician Statement  I have discussed the care of Terrell Lopez, including pertinent history and exam findings,  with the resident. I have seen and examined the patient and the key elements of all parts of the encounter have been performed by me. I agree with the assessment, plan and orders as documented by the resident with additions . Treatment plan Discussed with nursing staff in detail , all questions answered . Electronically signed by Ry Monsalve MD on   1/5/21 at 3:15 PM EST    Please note that this chart was generated using voice recognition Dragon dictation software. Although every effort was made to ensure the accuracy of this automated transcription, some errors in transcription may have occurred.

## 2021-01-05 NOTE — PROGRESS NOTES
WBC elevated at 21.6>20.6, lactic acid 3.5,Trop: 32>30>29, pro-bnp: 488 D-Dimer: 1.04. CT Chest negative for PE and COVID-19 swab negative. Patient was given 1 dose of IV Zosyn and Cipro in the emergency room   Patient was transferred to [facility]. Vs for continued management of Pneumonia.      CURRENT EVALUATION 01/05/21    Afebrile  VS stable    RR 18  On 2 L NC  02 sat 97    The patient seen and evaluated at bedside. Remains confused  Patient is better with no new acute issues or concerns today. Patient does not have any fevers, chills, chest pains or palpitations. He reports some SOB and cough. Expiratory wheezing on lung auscultation. 7 day course of unasyn completed 1/5/21    CXR:   · 12/17/20: Subtle opacities from pneumonia  · 12/26/20 Pneumonia with basilar consolidation or atelectasis       CT chest 12/25/20  Bilateral lower lobe consolidation and scattered bilateral ground-glass   nodular opacities are likely on the basis of pneumonia       Labs, X rays reviewed: 1/5/2021    BUN:25>17>18-->11-->12-->16  Cr:1.16>0.85>0.72-->0.83-->0.82-->0.92    WBC: 12.0-->9.2-->--8.6>7.3-->9.4  Hb: 9.5-->9.6-->9.5-->9.1-->8.8-->8.7  Plat: 320-->286-->257-->222-->236    Cultures:  Urine:  · Strept pneumonia antigen negative   · Legionella antigen negative    Blood:   · 12/25/20- no growth for 6 days  ·   Sputum :  ·   Wound:  ·     Discussed with patient, RN        I have personally reviewed the past medical history, past surgical history, medications, social history, and family history, and I have updated the database accordingly.   Past Medical History:     Past Medical History:   Diagnosis Date    Atherosclerosis     Benign prostatic hyperplasia     Hypertension     Hypothyroidism     Osteoporosis     Paroxysmal A-fib ([address]     Pneumonia     Rhabdomyolysis     Smoking     Spondylolisthesis     Vitamin D deficiency        Past Surgical  History:     Past Surgical History:   Procedure Laterality Date  ANKLE SURGERY      HIP SURGERY      TONSILLECTOMY AND ADENOIDECTOMY         Medications:      potassium chloride  20 mEq Oral Once    neomycin-bacitracin-polymyxin   Topical BID    enoxaparin  1 mg/kg Subcutaneous BID    ampicillin-sulbactam  3 g Intravenous Q6H    dextromethorphan-guaiFENesin  10 mL Oral 4 times per day    warfarin (COUMADIN) daily dosing (placeholder)   Other RX Placeholder    multivitamin  1 tablet Oral Daily    midodrine  5 mg Oral TID WC    sodium chloride flush  10 mL Intravenous 2 times per day    ipratropium-albuterol  1 ampule Inhalation Q4H WA    digoxin  125 mcg Oral Daily    dilTIAZem  120 mg Oral Daily    finasteride  5 mg Oral Daily    levothyroxine  75 mcg Oral Daily    tamsulosin  0.8 mg Oral Nightly       Social History:     Social History     Socioeconomic History    Marital status:      Spouse name: Not on file    Number of children: Not on file    Years of education: Not on file    Highest education level: Not on file   Occupational History    Not on file   Social Needs    Financial resource strain: Not on file    Food insecurity     Worry: Not on file     Inability: Not on file    Transportation needs     Medical: Not on file     Non-medical: Not on file   Tobacco Use    Smoking status: Never Smoker    Smokeless tobacco: Never Used   Substance and Sexual Activity    Alcohol use: Never     Frequency: Never     Binge frequency: Never    Drug use: Never    Sexual activity: Not on file   Lifestyle    Physical activity     Days per week: Not on file     Minutes per session: Not on file    Stress: Not on file   Relationships    Social connections     Talks on phone: Not on file     Gets together: Not on file     Attends Druze service: Not on file     Active member of club or organization: Not on file     Attends meetings of clubs or organizations: Not on file     Relationship status: Not on file    Intimate partner violence Fear of current or ex partner: Not on file     Emotionally abused: Not on file     Physically abused: Not on file     Forced sexual activity: Not on file   Other Topics Concern    Not on file   Social History Narrative    Not on file       Family History:   History reviewed. No pertinent family history. Allergies:   Patient has no known allergies. Review of Systems:   Unable to obtain much history from patient. Physical Examination :     Patient Vitals for the past 8 hrs:   BP Temp Temp src Pulse SpO2   01/05/21 0633 115/61 98 °F (36.7 °C) Oral 71 97 %     General Appearance: Awake, alert, confused (not oriented to location)  Head:  Normocephalic, no trauma  Eyes: Pupils equal, round, reactive to light and accommodation; extraocular movements intact; sclera anicteric; conjunctivae pink. No embolic phenomena. ENT: Oropharynx clear, without erythema, exudate, or thrush. No tenderness of sinuses. Mouth/throat: mucosa pink and moist. No lesions. Dentition in good repair. Neck:Supple, without lymphadenopathy. Thyroid normal, No bruits. Pulmonary/Chest: expiratory wheezing. No dullness to percussion. Cardiovascular: irregular rhythm without murmurs, rubs, or gallops. Abdomen: Soft, non tender. Bowel sounds normal. No organomegaly  All four Extremities: No cyanosis, clubbing, edema, or effusions. Neurologic: No gross sensory or motor deficits. Skin: Warm and dry with good turgor. No signs of peripheral arterial or venous insufficiency. No ulcerations. No open wounds.     Medical Decision Making -Laboratory:   I have independently reviewed/ordered the following labs:    CBC with Differential:   Recent Labs     01/04/21  0700 01/05/21  0546   WBC 7.3 9.4   HGB 8.8* 8.7*   HCT 29.1* 29.4*    236   LYMPHOPCT 26 24   MONOPCT 4 4     BMP:   Recent Labs     01/04/21  0700 01/05/21  0546    137   K 3.8 4.0    102   CO2 30 27   BUN 12 16   CREATININE 0.82 0.92   MG 2.2 2.3 Hepatic Function Panel:   Recent Labs     21  0700 21  0546   LABALBU 2.4* 2.3*     No results for input(s): RPR in the last 72 hours. No results for input(s): HIV in the last 72 hours. No results for input(s): BC in the last 72 hours. Lab Results   Component Value Date    MUCUS NOT REPORTED 2020    RBC 3.05 2021    TRICHOMONAS NOT REPORTED 2020    WBC 9.4 2021    YEAST NOT REPORTED 2020    TURBIDITY CLEAR 2020     Lab Results   Component Value Date    CREATININE 0.92 2021    GLUCOSE 91 2021       Medical Decision Making-Imagin20    Medical Decision Dqpmee-[id number]-Qmpbf:       Medical Decision Making-Other:     Note:  · Labs, medications, radiologic studies were reviewed with personal review of films  · Large amounts of data were reviewed  · Discussed with nursing Staff, Discharge planner  · Infection Control and Prevention measures reviewed  · All prior entries were reviewed  · Administer medications as ordered  · Prognosis: Guarded  · Discharge planning reviewed  · Follow up as outpatient. Thank you for allowing us to participate in the care of this patient. Please call with questions. Milton Gomes    ATTESTATION:    I have discussed the case, including pertinent history and exam findings with the residents and students. I have seen and examined the patient and the key elements of the encounter have been performed by me. I was present when the student obtained his information or examined the patient. I have reviewed the laboratory data, other diagnostic studies and discussed them with the residents. I have updated the medical record where necessary. I agree with the assessment, plan and orders as documented by the resident/ student.     Jolie Cowart MD.

## 2021-01-05 NOTE — PROGRESS NOTES
Pharmacy Note  Warfarin Consult follow-up      Recent Labs     01/05/21  0546   INR 1.5     Recent Labs     01/03/21  0605 01/04/21  0700 01/05/21  0546   HGB 8.8* 8.8* 8.7*   HCT 28.9* 29.1* 29.4*    222 236       Significant Drug-Drug Interactions:  New warfarin drug-drug interactions: none  Discontinued drug-drug interactions: none  Current warfarin drug-drug interactions: acetaminophen, Unasyn, enoxaparin, levothyroxine    Date INR Dose   12/25 2.1 None   12/26 2.1 2 mg    12/27 1.4 5 mg   12/28/2020 1.2 3mg   12/29/2020 1.1 3mg   12/30/2020 1.2 3 mg    12/31/2020 1.3 5mg   01/01/21 1.5 4mg   01/02/21 1.7 4mg   01/03/21 1.8 4mg   1/4/2021 1.8 3 mg    1/5/2021 1.5 5 mg        Notes:       INR dropped with the 3 mg dose of warfarin given. Will increase warfarin to 5 mg for today. Daily PT/INR while inpatient. [address] [place] [address]  Ph., CACP, Clinical Pharmacist  Anticoagulation Services, [facility]  1/5/2021  10:06 AM

## 2021-01-06 NOTE — PROGRESS NOTES
Pharmacy Note  Warfarin Consult follow-up      Recent Labs     01/06/21  0651   INR 1.5     Recent Labs     01/04/21  0700 01/05/21  0546 01/06/21  0651   HGB 8.8* 8.7* 9.5*   HCT 29.1* 29.4* 31.5*    236 280     Current warfarin drug-drug interactions: acetaminophen, Unasyn, enoxaparin, levothyroxine    Date INR Dose   12/25 2.1 None   12/26 2.1 2 mg    12/27 1.4 5 mg   12/28/2020 1.2 3mg   12/29/2020 1.1 3mg   12/30/2020 1.2 3 mg    12/31/2020 1.3 5mg   01/01/21 1.5 4mg   01/02/21 1.7 4mg   01/03/21 1.8 4mg   1/4/2021 1.8 3 mg    1/5/2021 1.5 5 mg    1/6/2021 1.5 5mg     Notes:  Give warfarin 5mg today on 1/6/2021. Daily PT/INR while inpatient.      Jing Richard, Neela, CACP  Clinical Pharmacist Medication Management  1/6/2021  8:01 AM

## 2021-01-06 NOTE — PROGRESS NOTES
[facility]  Progress Note                   Date:   1/6/2021  Patient name: Heena Castro  Date of admission:  12/25/2020 11:29 AM  MRN:   [id number]  YOB: 1935  PCP: Sofy Toribio MD    Reason for Admission: Pneumonia [J18.9]    Subjective:       Clinical Changes /Abnormalities: Seen & examined in room with RN. Denies any chest pain or SOB presently. Tele reviewed- no further NSVT noted. Labs and vitals reviewed. Review of Systems    Medications:   Scheduled Meds:   warfarin  5 mg Oral Once    potassium chloride  20 mEq Oral Once    neomycin-bacitracin-polymyxin   Topical BID    enoxaparin  1 mg/kg Subcutaneous BID    ampicillin-sulbactam  3 g Intravenous Q6H    dextromethorphan-guaiFENesin  10 mL Oral 4 times per day    warfarin (COUMADIN) daily dosing (placeholder)   Other RX Placeholder    multivitamin  1 tablet Oral Daily    midodrine  5 mg Oral TID WC    sodium chloride flush  10 mL Intravenous 2 times per day    ipratropium-albuterol  1 ampule Inhalation Q4H WA    digoxin  125 mcg Oral Daily    dilTIAZem  120 mg Oral Daily    finasteride  5 mg Oral Daily    levothyroxine  75 mcg Oral Daily    tamsulosin  0.8 mg Oral Nightly     Continuous Infusions:  CBC:   Recent Labs     01/04/21  0700 01/05/21  0546 01/06/21  0651   WBC 7.3 9.4 10.8   HGB 8.8* 8.7* 9.5*    236 280     BMP:    Recent Labs     01/04/21  0700 01/05/21  0546 01/06/21  0651    137 137   K 3.8 4.0 3.5*    102 101   CO2 30 27 26   BUN 12 16 16   CREATININE 0.82 0.92 0.72   GLUCOSE 87 91 89     Hepatic:No results for input(s): AST, ALT, ALB, BILITOT, ALKPHOS in the last 72 hours. Troponin: No results for input(s): TROPHS in the last 72 hours. BNP: No results for input(s): BNP in the last 72 hours. Lipids: No results for input(s): CHOL, HDL in the last 72 hours.     Invalid input(s): LDLCALCU  INR:   Recent Labs     01/04/21  0700 01/05/21  0546 01/06/21  0651   INR 1.8 1.5 1.5       Objective:   Vitals: /66   Pulse 87   Temp 97.8 °F (36.6 °C) (Oral)   Resp 16   Ht 6' 3\" (1.905 m)   Wt 183 lb 12.8 oz (83.4 kg)   SpO2 97%   BMI 22.97 kg/m²   General appearance: alert and cooperative with exam  HEENT: Head: Normocephalic, no lesions, without obvious abnormality. Neck:no JVD, trachea midline, no adenopathy  Lungs: Clear to auscultation, diminished, upper lobe rhonchi, no rales  Heart: regular rate and rhythm, s1/s2 auscultated, no murmurs SB   Abdomen: soft, non-tender, bowel sounds active  Extremities: no edema  Neurologic: not done        Assessment / Acute Cardiac Problems:   1. NSVT - resolved  2. PAF   3. HTn  4. CAD  5. PNA  6. Chronic resp failure on NC oxygen at home  7. Hypokalemia    Patient Active Problem List:     Elevated INR     Age-related osteoporosis with current pathological fracture     Atherosclerotic peripheral vascular disease (HCC)     Closed nondisplaced longitudinal fracture of right patella     Pathological compression fracture of lumbar vertebra (HCC)     Hypertension     Hypothyroidism     Benign prostatic hyperplasia     Paroxysmal atrial fibrillation (HCC)     Vitamin D deficiency     Chronic respiratory failure with hypoxia, on home oxygen therapy ([address]     Pneumonia due to infectious organism     Coagulopathy ([address]     Community acquired bacterial pneumonia     Chronic anticoagulation     Lactic acidosis     Sepsis ([address]     Acute respiratory failure with hypoxia (HCC)     Moderate malnutrition ([address]      Plan of Treatment:   1. Echo reviewed. Very limited images. Pt. Denies any pain or SOB. No further arrhythmias noted. He does not want further testing at this time. 2. SR currently . No further NSVT noted. Continue to monitor. Continue PO Cardizem & dig. Coumadin  3. Keep K >4 and Mg >2.  4. No objection to discharge from CV standpoint.  Will f/u in clinic in 1-2 weeks after discharge- as scheduled    Electronically signed by Dino Hedrick Nga Schafer - CNP on 1/6/2021 at [address] [place] [address]  [phone]

## 2021-01-06 NOTE — PROGRESS NOTES
Physical Therapy  Facility/Department: [address] ORTHO/MED SURG  Daily Treatment Note  NAME: Kyung Tyler  : 1935  MRN: [id number]    Date of Service: 2021    Discharge Recommendations:  Patient would benefit from continued therapy after discharge   PT Equipment Recommendations  Equipment Needed: Yes  Mobility Devices: Lark Rhonda: Rolling    Assessment   Body structures, Functions, Activity limitations: Decreased functional mobility ; Decreased strength;Decreased endurance;Decreased balance  Assessment: Pt performs bed mobility CGA, and all other functional mobility with Jeannie from therapist. Pt ambulated ~50ft with RW and Jeannie. Per RN, okay to walk pt off O2, and SPO2 dropped to 83% during ambulation. Returned pt to chair and put him back on 3L O2 NC. Would recommend continued PT services to address deficits and to return to PLOF. Prognosis: Good  PT Education: PT Role;Plan of Care;General Safety;Goals;Transfer Training;Gait Training;Home Exercise Program;Functional Mobility Training  REQUIRES PT FOLLOW UP: Yes  Activity Tolerance  Activity Tolerance: Patient limited by fatigue;Patient limited by cognitive status; Patient limited by endurance     Patient Diagnosis(es): The primary encounter diagnosis was Pneumonia due to organism. Diagnoses of Warfarin-induced coagulopathy ([address] and Elevated troponin were also pertinent to this visit. has a past medical history of Atherosclerosis, Benign prostatic hyperplasia, Hypertension, Hypothyroidism, Osteoporosis, Paroxysmal A-fib ([address] Pneumonia, Rhabdomyolysis, Smoking, Spondylolisthesis, and Vitamin D deficiency. has a past surgical history that includes Ankle surgery; hip surgery; and Tonsillectomy and adenoidectomy.     Restrictions  Restrictions/Precautions  Restrictions/Precautions: Fall Risk, Up as Tolerated  Required Braces or Orthoses?: No  Position Activity Restriction  Other position/activity restrictions: 3L O2 NC  Subjective   General  Chart Reviewed: Yes  Response To Previous Treatment: Patient with no complaints from previous session. Family / Caregiver Present: No  Subjective  Subjective: RN and pt agreeable to PT this morning. Pt supine in bed upon arrival on 3L O2 NC and with no c/o pain. Pt less confused today, and eager to participate in therapy. Pain Screening  Patient Currently in Pain: Denies  Vital Signs  Patient Currently in Pain: Denies       Orientation  Orientation  Overall Orientation Status: Impaired  Orientation Level: Disoriented to time;Disoriented to place; Disoriented to situation;Oriented to person  Cognition   Cognition  Overall Cognitive Status: Exceptions  Arousal/Alertness: Delayed responses to stimuli  Following Commands: Follows one step commands with repetition; Follows one step commands with increased time  Memory: Decreased recall of precautions;Decreased recall of recent events;Decreased short term memory;Decreased recall of biographical Information  Safety Judgement: Decreased awareness of need for assistance;Decreased awareness of need for safety  Problem Solving: Decreased awareness of errors;Assistance required to identify errors made;Assistance required to generate solutions;Assistance required to implement solutions;Assistance required to correct errors made  Insights: Decreased awareness of deficits  Initiation: Requires cues for all  Sequencing: Requires cues for all  Objective   Bed mobility  Supine to Sit: Contact guard assistance  Scooting: Contact guard assistance  Comment: Pt retired to chair at end of session. Transfers  Sit to Stand: Minimal Assistance  Stand to sit: Minimal Assistance  Comment: Pt required VC's for hand placement during transfer with good return. Ambulation  Ambulation?: Yes  Ambulation 1  Surface: level tile  Device: Rolling Walker  Assistance: Minimal assistance  Quality of Gait: LLE turns inward during gait.   Gait Deviations: Slow Chiara;Decreased step length;Decreased step height  Distance: ~50ft total  Comments: Per RN okay to walk pt off O2, during ambulation pts SPO2 dropped to 83%. Stairs/Curb  Stairs?: No     Balance  Posture: Fair  Sitting - Static: Good  Sitting - Dynamic: Good;-  Standing - Static: Fair;-  Standing - Dynamic: Fair;-  Comments: standing balance assessed with RW. Exercises:  Seated LE exercise program: Long Arc Quads, hip abduction/adduction, heel/toe raises, and marches.  Reps: 15x each LE    Goals  Short term goals  Time Frame for Short term goals: 14 visits  Short term goal 1: Pt will be Inessa bed mobility  Short term goal 2: Pt will be Inessa transfers  Short term goal 3: Pt will be Inessa amb 240' RW or least restrictive AD  Short term goal 4: Pt will navigate 2 steps Inessa    Plan    Plan  Times per week: 4- 5x/wk  Times per day: Daily  Current Treatment Recommendations: Strengthening, Endurance Training, Functional Mobility Training, Transfer Training, Gait Training, Balance Training, Stair training, Home Exercise Program, Safety Education & Training, Patient/Caregiver Education & Training, Equipment Evaluation, Education, & procurement  Safety Devices  Type of devices: Call light within reach, Gait belt, All fall risk precautions in place, Nurse notified, Left in chair, Chair alarm in place, Patient at risk for falls  Restraints  Initially in place: No     Therapy Time   Individual Concurrent Group Co-treatment   Time In 0954         Time Out 1017         Minutes 23         Timed Code Treatment Minutes: [address] [facility]

## 2021-01-06 NOTE — DISCHARGE INSTR - COC
Continuity of Care Form    Patient Name: Gloria Sylvester   :  1935  MRN:  [id number]    Admit date:  2020  Discharge date:  2021    Code Status Order: Full Code   Advance Directives:      Admitting Physician:  Sherman Thornton MD  PCP: Iveth Zuñiga MD    Discharging Nurse: [facility] Unit/Room#: 2567/9504-58  Discharging Unit Phone Number: [phone]    Emergency Contact:   Extended Emergency Contact Information  Primary Emergency Contact: mariam claudio  Home Phone: [phone]  Relation: Child    Past Surgical History:  Past Surgical History:   Procedure Laterality Date    ANKLE SURGERY      HIP SURGERY      TONSILLECTOMY AND ADENOIDECTOMY         Immunization History: There is no immunization history on file for this patient.     Active Problems:  Patient Active Problem List   Diagnosis Code    Elevated INR R79.1    Age-related osteoporosis with current pathological fracture M80.00XA    Atherosclerotic peripheral vascular disease ([facility]) I70.209    Closed nondisplaced longitudinal fracture of right patella S82.024A    Pathological compression fracture of lumbar vertebra ([facility]) M48.56XA    Hypertension I10    Hypothyroidism E03.9    Benign prostatic hyperplasia N40.0    Paroxysmal atrial fibrillation (HCC) I48.0    Vitamin D deficiency E55.9    Chronic respiratory failure with hypoxia, on home oxygen therapy ([facility] [address] J96.11, Z99.81    Pneumonia due to infectious organism J18.9    Coagulopathy ([address] D68.9    Community acquired bacterial pneumonia J15.9    Chronic anticoagulation Z79.01    Lactic acidosis E87.2    Sepsis ([facility]) A41.9    Acute respiratory failure with hypoxia ([facility]) J96.01    Moderate malnutrition ([facility]) E44.0       Isolation/Infection:   Isolation            Contact          Patient Infection Status       Infection Onset Added Last Indicated Last Indicated By Review Planned Expiration Resolved Resolved By    [facility] 20 Elisha Henry Lanie Martinez RN        Added from external infection. Resolved    COVID-19 Rule Out 12/26/20 12/26/20 12/26/20 Respiratory Panel, Molecular, with COVID-19 (Ordered)   12/28/20 Gerhardt Pollock, RN    COVID negative    COVID-19 Rule Out 12/25/20 12/25/20 12/25/20 COVID-19 (Ordered)   12/25/20 Rule-Out Test Resulted            Nurse Assessment:  Last Vital Signs: BP (!) 115/57   Pulse 95   Temp 97.6 °F (36.4 °C) (Oral)   Resp 16   Ht 6' 3\" (1.905 m)   Wt 183 lb 12.8 oz (83.4 kg)   SpO2 97%   BMI 22.97 kg/m²     Last documented pain score (0-10 scale): Pain Level: 0  Last Weight:   Wt Readings from Last 1 Encounters:   01/01/21 183 lb 12.8 oz (83.4 kg)     Mental Status:  alert and able to concentrate and follow conversation but get confused at times    IV Access:  - None    Nursing Mobility/ADLs:  Walking   Assisted  Transfer  Assisted  Bathing  Assisted  Dressing  Assisted  Toileting  Dependent  Feeding  Independent  Med Admin  Independent  Med Delivery   whole    Wound Care Documentation and Therapy:  Wound 12/14/20 Arm (Active)   Wound Etiology Skin Tear 01/01/21 0726   Dressing Status Clean;Dry; Intact 01/05/21 1940   Wound Cleansed Irrigated with saline 12/26/20 0710   Dressing/Treatment Foam 01/03/21 2000   Wound Assessment Dry 01/03/21 2000   Drainage Amount Small 01/03/21 2000   Drainage Description Sanguinous 01/03/21 2000   Number of days: 22       Wound 12/14/20 Hip Outer;Right dark and light purple bruising/ hematoma  (Active)   Wound Etiology Other 12/14/20 2030   Dressing Status Clean;Dry; Intact 01/05/21 1940   Wound Length (cm) 34 cm 12/14/20 2030   Wound Width (cm) 18 cm 12/14/20 2030   Wound Depth (cm) 0 cm 12/14/20 2030   Wound Surface Area (cm^2) 612 cm^2 12/14/20 2030   Wound Volume (cm^3) 0 cm^3 12/14/20 2030   Wound Assessment Purple/maroon 12/31/20 2000   Drainage Amount None 01/03/21 2000   Odor None 01/03/21 2000   Priyanka-wound Assessment Ecchymosis 01/03/21 2000   Number of days: 22 Wound 12/15/20 Coccyx Right stage 2 (Active)   Dressing Status Clean;Dry; Intact 01/05/21 1940   Dressing/Treatment Other (comment) 01/03/21 2000   Drainage Amount None 01/03/21 2000   Drainage Description Serosanguinous 12/27/20 0705   Odor None 01/03/21 2000   Wound Thickness Description not for Pressure Injury Full thickness 12/26/20 0710   Number of days: 22       Wound 12/31/20 Pelvis Anterior; Left skin tear in crease where abdomen and pelvis meet, approximately 4 inches in length (Active)   Wound Etiology Skin Tear 01/05/21 0720   Dressing Status Clean;Dry; Intact 01/05/21 1940   Wound Cleansed Cleansed with saline 01/05/21 0720   Dressing/Treatment Dry dressing 01/05/21 0720   Wound Assessment Bleeding;Pink/red 01/02/21 0800   Drainage Amount Small 01/03/21 2000   Drainage Description Sanguinous 01/03/21 2000   Odor None 01/03/21 2000   Priyanka-wound Assessment Intact 12/31/20 1642   Number of days: 6        Elimination:  Continence:   · Bowel: yes  · Bladder: No  Urinary Catheter: None   Colostomy/Ileostomy/Ileal Conduit: No       Date of Last BM: 1/8/2012    Intake/Output Summary (Last 24 hours) at 1/6/2021 1206  Last data filed at 1/6/2021 0604  Gross per 24 hour   Intake --   Output 603 ml   Net -603 ml     I/O last 3 completed shifts:  In: -   Out: 603 [[facility]:462]    Safety Concerns:      At Risk for Falls    Impairments/Disabilities:      None    Nutrition Therapy:  Current Nutrition Therapy:   - Oral Diet:  Dental Soft    Routes of Feeding: Oral  Liquids: thickened  Daily Fluid Restriction: no  Last Modified Barium Swallow with Video (Video Swallowing Test): not done    Treatments at the Time of Hospital Discharge:   Respiratory Treatments: IS  Oxygen Therapy:  Oxygen per nc at 4 liters  Ventilator:    - No ventilator support    Rehab Therapies: Physical Therapy and Occupational Therapy  Weight Bearing Status/Restrictions: No weight bearing restirctions  Other Medical Equipment (for information only, NOT a DME order):  walker  Other Treatments: wound care per ecf policy    Patient's personal belongings (please select all that are sent with patient):  None    RN SIGNATURE:  Electronically signed by Meryl Fatima RN on 1/7/21 at 9:39 AM EST    CASE MANAGEMENT/SOCIAL WORK SECTION    Inpatient Status Date: ***    Readmission Risk Assessment Score:  Readmission Risk              Risk of Unplanned Readmission:        21           Discharging to Facility/ Agency   Name:   [id number] Details  FAX            [address] [address]       Phone: [phone]       Fax: [phone]        ·   · Address:  · Phone:  · Fax:    Dialysis Facility (if applicable)   · Name:  · Address:  · Dialysis Schedule:  · Phone:  · Fax:    / signature: Electronically signed by Gabriella Dotson RN on 1/7/21 at 5:07 PM EST    PHYSICIAN SECTION    Prognosis: Fair    Condition at Discharge: Stable    Rehab Potential (if transferring to Rehab): Good    Recommended Labs or Other Treatments After Discharge: CBC and CMP in 3 days INR in 2 days    Physician Certification: I certify the above information and transfer of Ignacio Perez  is necessary for the continuing treatment of the diagnosis listed and that he requires [facility] for less 30 days.      Update Admission H&P: No change in H&P    PHYSICIAN SIGNATURE:  Electronically signed by Jose Pena MD on 1/8/21 at 11:06 PM EST

## 2021-01-06 NOTE — PROGRESS NOTES
Av.8 °F (36.6 °C)  Min: 97.6 °F (36.4 °C)  Max: 98 °F (25.5 °C)   BP Systolic (97QND), UIM:161 , Min:99 , JLK:646      Diastolic (78YUE), HAI:48, Min:49, Max:66     PULSE Pulse  Av.6  Min: 66  Max: 95   RR Resp  Av  Min: 16  Max: 16   O2 SAT SpO2  Av %  Min: 97 %  Max: 97 %   OXYGEN DELIVERY O2 Flow Rate (L/min)  Avg: 3 L/min  Min: 3 L/min  Max: 3 L/min     Systemic Examination:   General appearance - looks comfortable and in no acute distress  Eyes - pupils equal and reactive, extraocular eye movements intact  Mouth - mucous membranes moist, pharynx normal without lesions  Neck - supple, no significant adenopathy  Chest -breath sounds diminished at bases, occasional crackles  Heart - normal rate, regular rhythm, normal S1, S2, no murmurs, rubs, clicks or gallops  Abdomen - soft, nontender, nondistended, no masses or organomegaly  Neurological -awake and alert, slightly confused, no focal findings or movement disorder noted  Extremities - peripheral pulses normal, no pedal edema, no clubbing or cyanosis  Skin - normal coloration and turgor, no rashes, no suspicious skin lesions noted     DATA REVIEW     Medications:  Scheduled Meds:   warfarin  5 mg Oral Once    potassium chloride  20 mEq Oral Once    neomycin-bacitracin-polymyxin   Topical BID    enoxaparin  1 mg/kg Subcutaneous BID    ampicillin-sulbactam  3 g Intravenous Q6H    dextromethorphan-guaiFENesin  10 mL Oral 4 times per day    warfarin (COUMADIN) daily dosing (placeholder)   Other RX Placeholder    multivitamin  1 tablet Oral Daily    midodrine  5 mg Oral TID     sodium chloride flush  10 mL Intravenous 2 times per day    ipratropium-albuterol  1 ampule Inhalation Q4H WA    digoxin  125 mcg Oral Daily    dilTIAZem  120 mg Oral Daily    finasteride  5 mg Oral Daily    levothyroxine  75 mcg Oral Daily    tamsulosin  0.8 mg Oral Nightly     Continuous Infusions:    LABS:-  ABG:   No results for input(s): POCPH, POCPCO2, POCPO2, POCHCO3, UFJH2LIG in the last 72 hours. CBC:   Recent Labs     01/04/21  0700 01/05/21  0546 01/06/21  0651   WBC 7.3 9.4 10.8   HGB 8.8* 8.7* 9.5*   HCT 29.1* 29.4* 31.5*   MCV 94.8 96.4 95.7    236 280   LYMPHOPCT 26 24 16*   RBC 3.07* 3.05* 3.29*   MCH 28.7 28.5 28.9   MCHC 30.2 29.6 30.2   RDW 19.7* 19.9* 19.8*     BMP:   Recent Labs     01/04/21  0700 01/05/21  0546 01/06/21  0651    137 137   K 3.8 4.0 3.5*    102 101   CO2 30 27 26   BUN 12 16 16   CREATININE 0.82 0.92 0.72   GLUCOSE 87 91 89   PHOS 3.1 3.1 2.9     Liver Function Test:   Recent Labs     01/06/21  0651   LABALBU 2.6*     Amylase/Lipase:  No results for input(s): AMYLASE, LIPASE in the last 72 hours. Coagulation Profile:   Recent Labs     01/04/21  0700 01/05/21  0546 01/06/21  0651   INR 1.8 1.5 1.5   PROTIME 18.3* 15.8* 15.1*     Cardiac Enzymes:  No results for input(s): CKTOTAL, CKMB, CKMBINDEX, TROPONINI in the last 72 hours. Lactic Acid:  No results found for: LACTA  BNP:   No results found for: BNP  D-Dimer:  Lab Results   Component Value Date    DDIMER 1.04 12/25/2020     Others:   No results found for: TSH, R7BOEBH, G4WPNBG, THYROIDAB, FT3, T4FREE  No results found for: BC, RHEUMFACTOR, SEDRATE, CRP  No results found for: LABURIC, URICACID  Lab Results   Component Value Date    IRON 25 (L) 12/28/2020    TIBC 111 (L) 12/28/2020    FERRITIN 837 (H) 12/28/2020     No results found for: SPEP, UPEP  No results found for: PSA, CEA, , KA2723,     Input/Output:    Intake/Output Summary (Last 24 hours) at 1/6/2021 1247  Last data filed at 1/6/2021 0604  Gross per 24 hour   Intake --   Output 603 ml   Net -603 ml       Microbiology:  No results for input(s): SPECDESC, SPECDESC, SPECIAL, CULTURE, CULTURE, STATUS, ORG, CDIFFTOXPCR, CAMPYLOBPCR, SALMONELLAPC, SHIGAPCR, SHIGELLAPCR, MPNEUG, MPNEUM, LACTOQL in the last 72 hours.     Pathology:    Radiology reports:  XR CHEST PORTABLE   Final Result Stable chest      Moderate left perihilar left basilar pleuroparenchymal process      Mild streaky right perihilar/right basilar atelectasis         XR CHEST (SINGLE VIEW FRONTAL)   Final Result   Persistent left lower lobe opacification concerning for pneumonia. Interval   improvement of right basilar infiltrate. Left perihilar atelectasis. XR CHEST PORTABLE   Final Result   Persistent findings of aspiration or pneumonia near the bases, appearing   worsened in the right base and decreased in the lingula. XR CHEST (2 VW)   Final Result   Similar findings to those seen on recent CT, compatible with pneumonia and   some basilar atelectasis/consolidation. CT CHEST PULMONARY EMBOLISM W CONTRAST   Final Result   No central or segmental pulmonary embolus. Bilateral lower lobe consolidation and scattered bilateral ground-glass   nodular opacities are likely on the basis of pneumonia. Recommend follow-up. XR CHEST PORTABLE   Final Result   Subtle patchy bilateral lung opacities increased from the prior study are   likely on the basis of pneumonia. Echocardiogram:   No results found for this or any previous visit. Cardiac Catheterization:   No results found for this or any previous visit. ASSESSMENT AND PLAN     Assessment:    // Acute hypoxemic respiratory failure, improving  // Bilateral multifocal pneumonia  // Paroxysmal atrial fibrillation  // Dementia  // Essential hypertension  // BPH  // Hypothyroidism    Plan:     Patient remains hemodynamically stable   He has been weaned off high flow nasal cannula, currently at 3 L/min which is his home oxygen.  Continue supplemental oxygen to keep oxygen saturation greater than 92%   Agree with anticoagulation with Lovenox and bridging to warfarin. Management of anticoagulation for A. fib per primary.  Completed course of Unasyn today for aspiration pneumonia. Monitor off antibiotics.    Okay to be discharged from pulmonary standpoint after achieving therapeutic INR. It was my pleasure to evaluate Justyna Whitehead today. We will continue to follow. I would like to thank you for allowing me to participate in the care of this patient. Please feel free to call with any further questions or concerns. Niya Rome M.D. Internal medicine resident, PGY 2  Pulmonary and Critical Care Medicine           1/6/2021   Attending Physician Statement  I have discussed the care of Terrell Lopez, including pertinent history and exam findings,  with the resident. I have seen and examined the patient and the key elements of all parts of the encounter have been performed by me. I agree with the assessment, plan and orders as documented by the resident with additions . Treatment plan Discussed with nursing staff in detail , all questions answered . Electronically signed by Cornell Gamino MD on   1/6/21 at 5:33 PM EST    Please note that this chart was generated using voice recognition Dragon dictation software. Although every effort was made to ensure the accuracy of this automated transcription, some errors in transcription may have occurred.

## 2021-01-06 NOTE — PROGRESS NOTES
[facility] - Progress Note    Today's Date and Time: 1/6/2021, 8:53 AM    Impression :   · AFib (chronically anticoagulated with warfarin)  · HTN  · BPH  · Hypothyroidism  · Dementia   · Pressure wound on coccyx. · Aspiration pneumonia  · Single blood culture with Coagulase negative Staphylococci on 12/15/2020  · Covid tests:  · 12/14/2020 -negative  · 12/25/2020 negative    Recommendations:   · Monitor off antibiotics:  · Completed 7 days of unasyn 1/5/21  · Monitor clinical response off antibiotics  · Wound care    Medical Decision Making/Summary/Discussion:1/6/2021     ·   Infection Control Recommendations   · [place] Precautions  · Contact Isolation MRSA    Antimicrobial Stewardship Recommendations     · Simplification of therapy    Coordination of Outpatient Care:   · Estimated Length of IV antimicrobials:TBD  · Patient will need Midline Catheter Insertion: No  · Patient will need PICC line Insertion:No  · Patient will need: Home IV , [place],  SNF,  LTAC:TBD  · Patient will need outpatient wound care:Yes    Chief complaint/reason for consultation:   · Pneumonia  · Sacral decubiti    History of Present Illness:   Fuentes Shoemaker is a 82y.o.-year-old white male who was initially admitted on 12/25/2020. Patient seen at the request of Dr. Albertina Gramajo DO.    INITIAL HISTORY:    This is a patient with AFib (chronically anticoagulated with warfarin), HTN, BPH, hypothyroidism, dementia who presents from [facility] ER with shortness of breath. Patient is a poor historian, thus most information is collected from EMR. EMS was called from patient's nursing facility because of increased shortness of breath. Patient uses 4 L oxygen at home,  he was saturating  85% on 4L. Patient was recently discharged from hospital on 12/18/2020 after treatment for pneumonia and supra therapeutic INR. Patient was discharged on PO doxycycline. CXR done at outlying hospital read as worsening pneumonia. WBC elevated at 21.6>20.6, lactic acid 3.5,Trop: 32>30>29, pro-bnp: 488 D-Dimer: 1.04. CT Chest negative for PE and COVID-19 swab negative. Patient was given 1 dose of IV Zosyn and Cipro in the emergency room   Patient was transferred to [facility]. Vs for continued management of Pneumonia.      CURRENT EVALUATION 01/06/21    Afebrile  VS stable    RR 16  On 3 L NC  02 sat 97    The patient seen and evaluated at bedside. Patient is better with no new acute issues or concerns today. Patient does not have any fevers, chills,chest pains or palpitations. He reports some SOB and cough. He remains somewhat confused    Expiratory wheezing on lung auscultation. 7 day course of unasyn completed 1/5/21    CXR:   · 12/17/20: Subtle opacities from pneumonia  · 12/26/20 Pneumonia with basilar consolidation or atelectasis       CT chest 12/25/20  Bilateral lower lobe consolidation and scattered bilateral ground-glass   nodular opacities are likely on the basis of pneumonia       Labs, X rays reviewed: 1/6/2021    BUN:25>17>18-->11-->12-->16  Cr:1.16>0.85>0.72-->0.83-->0.82-->0.92    WBC: 12.0-->9.2-->--8.6>7.3-->9.4-->10.8  Hb: 9.5-->9.6-->9.5-->9.1-->8.8-->8.7-->9.5  Plat: 320-->286-->257-->222-->236-->280    Cultures:  Urine:  · Strept pneumonia antigen negative   · Legionella antigen negative    Blood:   · 12/25/20- no growth for 6 days  ·   Sputum :  ·   Wound:  ·     Discussed with patient, RN        I have personally reviewed the past medical history, past surgical history, medications, social history, and family history, and I have updated the database accordingly.   Past Medical History:     Past Medical History:   Diagnosis Date    Atherosclerosis     Benign prostatic hyperplasia  Hypertension     Hypothyroidism     Osteoporosis     Paroxysmal A-fib ([facility] [address]     Pneumonia     Rhabdomyolysis     Smoking     Spondylolisthesis     Vitamin D deficiency        Past Surgical  History:     Past Surgical History:   Procedure Laterality Date    ANKLE SURGERY      HIP SURGERY      TONSILLECTOMY AND ADENOIDECTOMY         Medications:      warfarin  5 mg Oral Once    potassium chloride  20 mEq Oral Once    neomycin-bacitracin-polymyxin   Topical BID    enoxaparin  1 mg/kg Subcutaneous BID    ampicillin-sulbactam  3 g Intravenous Q6H    dextromethorphan-guaiFENesin  10 mL Oral 4 times per day    warfarin (COUMADIN) daily dosing (placeholder)   Other RX Placeholder    multivitamin  1 tablet Oral Daily    midodrine  5 mg Oral TID WC    sodium chloride flush  10 mL Intravenous 2 times per day    ipratropium-albuterol  1 ampule Inhalation Q4H WA    digoxin  125 mcg Oral Daily    dilTIAZem  120 mg Oral Daily    finasteride  5 mg Oral Daily    levothyroxine  75 mcg Oral Daily    tamsulosin  0.8 mg Oral Nightly       Social History:     Social History     Socioeconomic History    Marital status:      Spouse name: Not on file    Number of children: Not on file    Years of education: Not on file    Highest education level: Not on file   Occupational History    Not on file   Social Needs    Financial resource strain: Not on file    Food insecurity     Worry: Not on file     Inability: Not on file   [employer] needs     Medical: Not on file     Non-medical: Not on file   Tobacco Use    Smoking status: Never Smoker    Smokeless tobacco: Never Used   Substance and Sexual Activity    Alcohol use: Never     Frequency: Never     Binge frequency: Never    Drug use: Never    Sexual activity: Not on file   Lifestyle    Physical activity     Days per week: Not on file     Minutes per session: Not on file    Stress: Not on file   Relationships    Social connections Talks on phone: Not on file     Gets together: Not on file     Attends Congregational service: Not on file     Active member of club or organization: Not on file     Attends meetings of clubs or organizations: Not on file     Relationship status: Not on file    Intimate partner violence     Fear of current or ex partner: Not on file     Emotionally abused: Not on file     Physically abused: Not on file     Forced sexual activity: Not on file   Other Topics Concern    Not on file   Social History Narrative    Not on file       Family History:   History reviewed. No pertinent family history. Allergies:   Patient has no known allergies. Review of Systems:   Unable to obtain much history from patient. Physical Examination :     Patient Vitals for the past 8 hrs:   BP Temp Temp src Pulse Resp SpO2   01/06/21 0828    87 16    01/06/21 0638 116/66 97.8 °F (36.6 °C) Oral 74  97 %     General Appearance: Awake, alert, confused (not oriented to location)  Head:  Normocephalic, no trauma  Eyes: Pupils equal, round, reactive to light and accommodation; extraocular movements intact; sclera anicteric; conjunctivae pink. No embolic phenomena. ENT: Oropharynx clear, without erythema, exudate, or thrush. No tenderness of sinuses. Mouth/throat: mucosa pink and moist. No lesions. Dentition in good repair. Neck:Supple, without lymphadenopathy. Thyroid normal, No bruits. Pulmonary/Chest: expiratory wheezing. No dullness to percussion. Cardiovascular: irregular rhythm without murmurs, rubs, or gallops. Abdomen: Soft, non tender. Bowel sounds normal. No organomegaly  All four Extremities: No cyanosis, clubbing, edema, or effusions. Neurologic: No gross sensory or motor deficits. Skin: Warm and dry with good turgor. No signs of peripheral arterial or venous insufficiency. No ulcerations. No open wounds.     Medical Decision Making -Laboratory:   I have independently reviewed/ordered the following labs: CBC with Differential:   Recent Labs     21  0546 21  0651   WBC 9.4 10.8   HGB 8.7* 9.5*   HCT 29.4* 31.5*    280   LYMPHOPCT 24 16*   MONOPCT 4 3     BMP:   Recent Labs     21  0546 21  0651    137   K 4.0 3.5*    101   CO2 27 26   BUN 16 16   CREATININE 0.92 0.72   MG 2.3 2.3     Hepatic Function Panel:   Recent Labs     21  0546 21  0651   LABALBU 2.3* 2.6*     No results for input(s): RPR in the last 72 hours. No results for input(s): HIV in the last 72 hours. No results for input(s): BC in the last 72 hours. Lab Results   Component Value Date    MUCUS NOT REPORTED 2020    RBC 3.29 2021    TRICHOMONAS NOT REPORTED 2020    WBC 10.8 2021    YEAST NOT REPORTED 2020    TURBIDITY CLEAR 2020     Lab Results   Component Value Date    CREATININE 0.72 2021    GLUCOSE 89 2021       Medical Decision Making-Imagin20    Medical Decision Avqbin-Jwtlusbq-Ezaon:       Medical Decision Making-Other:     Note:  · Labs, medications, radiologic studies were reviewed with personal review of films  · Large amounts of data were reviewed  · Discussed with nursing Staff, Discharge planner  · Infection Control and Prevention measures reviewed  · All prior entries were reviewed  · Administer medications as ordered  · Prognosis: Guarded  · Discharge planning reviewed  · Follow up as outpatient. Deepa Plasencia    ATTESTATION:    I have discussed the case, including pertinent history and exam findings with the residents and students. I have seen and examined the patient and the key elements of the encounter have been performed by me. I was present when the student obtained his information or examined the patient. I have reviewed the laboratory data, other diagnostic studies and discussed them with the residents. I have updated the medical record where necessary. I agree with the assessment, plan and orders as documented by the resident/ student.     Carlos Rasmussen MD.

## 2021-01-06 NOTE — DISCHARGE SUMMARY
[facility]  Office: Danny Rosado, DO, Yaneth Fall DO, Raymon Hines, DO, Maurilio De La Torre Blood, DO, Kavon Sanchez MD, Jacobo Padilla MD, Konrad Flores MD, Danilo Mcwilliams MD, Lalita Romeo MD, Margarita Tracey MD, Chantel Duke MD, Hiren Son MD, Leandra Meza MD, Stephanie Manuel DO, Gibson Yousif MD, Sid Schmitz MD, Wayne Brisk, DO, Charlee Altman MD,  Roger Brown DO, Michael Chen MD, Asia Brown MD, Javid Rangel, [facility], [facility] Louann, CNP, Brittany Rivers, CNP, Turner Lai, CNS, Misael Quintero, CNP, June Mack, CNP, Crista Ren, CNP, Jono Hopson, CNP, Mary Ellen José, CNP, Colt Alvarez PA-C, Bob Arrington, [facility], Evette Duke, CNP, Alesha Jones, CNP, Zully Pollack, CNP, Adela Alcantara, CNP, Alivia Noe, [address] [place] [address]    Discharge Summary     Patient ID: Libra Nolasco  :  1935   MRN: [id number]     ACCOUNT:  [de-identified]   Patient's PCP: Hannah Mehta MD  Admit Date: 2020   Discharge Date: 2021     Length of Stay: 12  Code Status:  Full Code  Admitting Physician: Gibson Yousif MD  Discharge Physician: Gibson Yousif MD     Active Discharge Diagnoses:     Hospital Problem Lists:  Principal Problem:    Community acquired bacterial pneumonia  Active Problems:    Hypertension    Hypothyroidism    Benign prostatic hyperplasia    Paroxysmal atrial fibrillation (HCC)    Chronic anticoagulation    Lactic acidosis    Sepsis ([address]    Acute respiratory failure with hypoxia ([address]    Moderate malnutrition ([address]  Resolved Problems:    * No resolved hospital problems.  *      Admission Condition:  poor     Discharged Condition: fair    Hospital Stay:     Hospital Course:  Libra Nolasco is a 82 y.o. male who was admitted for the management of   Community acquired bacterial pneumonia , presented to ER with Respiratory Distress    #1 pneumonia probably aspiration pneumonia associated CREATININE 0.72 01/06/2021    BUNCRER NOT REPORTED 01/06/2021    CALCIUM 8.9 01/06/2021    LABGLOM >60 01/06/2021    GFRAA >60 01/06/2021    GFR      01/06/2021    GFR NOT REPORTED 01/06/2021     HFP:    Lab Results   Component Value Date    PROT 6.4 12/25/2020     CMP:    Lab Results   Component Value Date    GLUCOSE 89 01/06/2021     01/06/2021    K 3.5 01/06/2021     01/06/2021    CO2 26 01/06/2021    BUN 16 01/06/2021    CREATININE 0.72 01/06/2021    ANIONGAP 10 01/06/2021    ALKPHOS 90 12/25/2020    ALT 28 12/25/2020    AST 20 12/25/2020    BILITOT 0.63 12/25/2020    LABALBU 2.6 01/06/2021    ALBUMIN 0.9 12/25/2020    LABGLOM >60 01/06/2021    GFRAA >60 01/06/2021    GFR      01/06/2021    GFR NOT REPORTED 01/06/2021    PROT 6.4 12/25/2020    CALCIUM 8.9 01/06/2021     PT/INR:    Lab Results   Component Value Date    PROTIME 15.1 01/06/2021    INR 1.5 01/06/2021     PTT:   Lab Results   Component Value Date    APTT 37.4 12/25/2020     FLP:  No results found for: CHOL, TRIG, HDL  U/A:    Lab Results   Component Value Date    COLORU YELLOW 12/14/2020    TURBIDITY CLEAR 12/14/2020    SPECGRAV 1.024 12/14/2020    HGBUR SMALL 12/14/2020    PHUR 5.5 12/14/2020    PROTEINU NEGATIVE 12/14/2020    GLUCOSEU NEGATIVE 12/14/2020    KETUA NEGATIVE 12/14/2020    BILIRUBINUR NEGATIVE 12/14/2020    UROBILINOGEN Normal 12/14/2020    NITRU NEGATIVE 12/14/2020    LEUKOCYTESUR NEGATIVE 12/14/2020     TSH:  No results found for: TSH     Radiology:  Xr Chest (single View Frontal)    Result Date: 12/30/2020  Persistent left lower lobe opacification concerning for pneumonia. Interval improvement of right basilar infiltrate. Left perihilar atelectasis.      Xr Chest Portable    Result Date: 1/5/2021  Stable chest Moderate left perihilar left basilar pleuroparenchymal process Mild streaky right perihilar/right basilar atelectasis       Consultations:    Consults:     Final Specialist Recommendations/Findings:   PHARMACY TO DOSE WARFARIN  IP CONSULT TO INFECTIOUS DISEASES  IP CONSULT TO PULMONOLOGY  IP CONSULT TO IV TEAM  IP CONSULT TO CARDIOLOGY      The patient was seen and examined on day of discharge and this discharge summary is in conjunction with any daily progress note from day of discharge. Discharge plan:     Disposition: Rehab    Physician Follow Up: [facility]  [address]  [address] [facility]  On 1/19/2021  at 1:30pm for hospital follow-up with cardiology    Hannah Mehta MD  [address] [facility]  [phone]    In 1 week         Requiring Further Evaluation/Follow Up POST HOSPITALIZATION/Incidental Findings:     Diet: Continue current diet as above    Activity: As tolerated    Instructions to Patient:     Discharge Medications:      Medication List      START taking these medications    albuterol (2.5 MG/3ML) 0.083% nebulizer solution  Commonly known as: PROVENTIL  Take 3 mLs by nebulization every 2 hours as needed for Wheezing     enoxaparin 80 MG/0.8ML injection  Commonly known as: LOVENOX  Inject 0.8 mLs into the skin 2 times daily     ipratropium-albuterol 0.5-2.5 (3) MG/3ML Soln nebulizer solution  Commonly known as: DUONEB  Inhale 3 mLs into the lungs every 4 hours as needed for Shortness of Breath     midodrine 5 MG tablet  Commonly known as: PROAMATINE  Take 1 tablet by mouth 3 times daily (with meals)        CONTINUE taking these medications    acetaminophen 325 MG tablet  Commonly known as: TYLENOL     collagenase 250 UNIT/GM ointment     digoxin 125 MCG tablet  Commonly known as: LANOXIN     dilTIAZem 120 MG Tb24 extended release tablet  Commonly known as: CARDIZEM LA     ergocalciferol 1.25 MG (28659 UT) capsule  Commonly known as: ERGOCALCIFEROL     finasteride 5 MG tablet  Commonly known as: PROSCAR     levothyroxine 100 MCG tablet  Commonly known as: SYNTHROID     nystatin Powd powder  Commonly known as: MYCOSTATIN     tamsulosin 0.4 MG capsule  Commonly known as: FLOMAX     Vitamin D3 125 MCG (5000 UT) Tabs     warfarin 2 MG tablet  Commonly known as: Coumadin  Take 1 tablet by mouth daily 12/19 12/20 repeat INR 12/21        STOP taking these medications    doxycycline hyclate 100 MG tablet  Commonly known as: VIBRA-TABS     oxyCODONE-acetaminophen 5-325 MG per tablet  Commonly known as: PERCOCET           Where to Get Your Medications      These medications were sent to  [facility] [phone] - [place] [address]    Phone: [phone]   · albuterol (2.5 MG/3ML) 0.083% nebulizer solution  · enoxaparin 80 MG/0.8ML injection  · ipratropium-albuterol 0.5-2.5 (3) MG/3ML Soln nebulizer solution  · midodrine 5 MG tablet         Discharge Procedure Orders   Protime-INR   Standing Status: Future Standing Exp. Date: 01/15/21     Order Specific Question Answer Comments   Daily Coumadin Dose? For A. fib        Time Spent on discharge is  35 mins in patient examination, evaluation, counseling as well as medication reconciliation, prescriptions for required medications, discharge plan and follow up. Electronically signed by   Lucila Dumont MD  1/6/2021  11:44 AM      Thank you Dr. Patty Castro MD for the opportunity to be involved in this patient's care.

## 2021-01-06 NOTE — PROGRESS NOTES
[facility] - Progress Note    Today's Date and Time: 1/6/2021, 12:11 PM    Impression :   · AFib (chronically anticoagulated with warfarin)  · HTN  · BPH  · Hypothyroidism  · Dementia   · Pressure wound on coccyx. · Aspiration pneumonia  · Single blood culture with Coagulase negative Staphylococci on 12/15/2020  · Covid tests:  · 12/14/2020 -negative  · 12/25/2020 negative    Recommendations:   · Monitor off antibiotics:  · Completed 7 days of unasyn 1/5/21  · Monitor clinical response off antibiotics  · Wound care    Medical Decision Making/Summary/Discussion:1/6/2021     ·   Infection Control Recommendations   · [place] Precautions  · Contact Isolation MRSA    Antimicrobial Stewardship Recommendations     · Simplification of therapy    Coordination of Outpatient Care:   · Estimated Length of IV antimicrobials:TBD  · Patient will need Midline Catheter Insertion: No  · Patient will need PICC line Insertion:No  · Patient will need: Home IV , [place],  SNF,  LTAC:TBD  · Patient will need outpatient wound care:Yes    Chief complaint/reason for consultation:   · Pneumonia  · Sacral decubiti    History of Present Illness:   Lisa Bhakta is a 82y.o.-year-old white male who was initially admitted on 12/25/2020. Patient seen at the request of Dr. Bal Little DO.    INITIAL HISTORY:    This is a patient with AFib (chronically anticoagulated with warfarin), HTN, BPH, hypothyroidism, dementia who presents from [facility] ER with shortness of breath. Patient is a poor historian, thus most information is collected from EMR. EMS was called from patient's nursing facility because of increased shortness of breath. Patient uses 4 L oxygen at home,  he was saturating  85% on 4L. Patient was recently discharged from hospital on 12/18/2020 after treatment for pneumonia and supra therapeutic INR. Patient was discharged on PO doxycycline.   CXR done at [facility] read as worsening pneumonia. WBC elevated at 21.6>20.6, lactic acid 3.5,Trop: 32>30>29, pro-bnp: 488 D-Dimer: 1.04. CT Chest negative for PE and COVID-19 swab negative. Patient was given 1 dose of IV Zosyn and Cipro in the emergency room   Patient was transferred to [facility] for continued management of Pneumonia.      CURRENT EVALUATION 01/06/21    Afebrile  VS stable    The patient seen and evaluated at bedside. Patient is better with no new acute issues or concerns today. Patient does not have any fevers, chills, chest pains or palpitations. He describes cough, shortness of breath. He remains somewhat confused    RR 16  On 3 L NC  02 sat 97    Expiratory wheezing on lung auscultation. Monitor off antibiotics  · 7 day course of unasyn completed 1/5/21    CXR:   · 12/17/20: Subtle opacities from pneumonia  · 12/26/20 Pneumonia with basilar consolidation or atelectasis       CT chest 12/25/20  Bilateral lower lobe consolidation and scattered bilateral ground-glass   nodular opacities are likely on the basis of pneumonia       Labs, X rays reviewed: 1/6/2021    BUN:25>17>18-->11-->12-->16  Cr:1.16>0.85>0.72-->0.83-->0.82-->0.92    WBC: 12.0-->9.2-->--8.6>7.3-->9.4-->10.8  Hb: 9.5-->9.6-->9.5-->9.1-->8.8-->8.7-->9.5  Plat: 320-->286-->257-->222-->236-->280    Cultures:  Urine:  · Strept pneumonia antigen negative   · Legionella antigen negative    Blood:   · 12/25/20- no growth for 6 days  ·   Sputum :  ·   Wound:  ·     Discussed with patient, RN        I have personally reviewed the past medical history, past surgical history, medications, social history, and family history, and I have updated the database accordingly.   Past Medical History:     Past Medical History:   Diagnosis Date    Atherosclerosis     Benign prostatic hyperplasia     Hypertension     Hypothyroidism     Osteoporosis     Paroxysmal A-fib ([address]     Pneumonia     Rhabdomyolysis     Smoking     Spondylolisthesis     Vitamin D deficiency        Past Surgical  History:     Past Surgical History:   Procedure Laterality Date    ANKLE SURGERY      HIP SURGERY      TONSILLECTOMY AND ADENOIDECTOMY         Medications:      warfarin  5 mg Oral Once    potassium chloride  20 mEq Oral Once    neomycin-bacitracin-polymyxin   Topical BID    enoxaparin  1 mg/kg Subcutaneous BID    ampicillin-sulbactam  3 g Intravenous Q6H    dextromethorphan-guaiFENesin  10 mL Oral 4 times per day    warfarin (COUMADIN) daily dosing (placeholder)   Other RX Placeholder    multivitamin  1 tablet Oral Daily    midodrine  5 mg Oral TID WC    sodium chloride flush  10 mL Intravenous 2 times per day    ipratropium-albuterol  1 ampule Inhalation Q4H WA    digoxin  125 mcg Oral Daily    dilTIAZem  120 mg Oral Daily    finasteride  5 mg Oral Daily    levothyroxine  75 mcg Oral Daily    tamsulosin  0.8 mg Oral Nightly       Social History:     Social History     Socioeconomic History    Marital status:      Spouse name: Not on file    Number of children: Not on file    Years of education: Not on file    Highest education level: Not on file   Occupational History    Not on file   Social Needs    Financial resource strain: Not on file    Food insecurity     Worry: Not on file     Inability: Not on file    Transportation needs     Medical: Not on file     Non-medical: Not on file   Tobacco Use    Smoking status: Never Smoker    Smokeless tobacco: Never Used   Substance and Sexual Activity    Alcohol use: Never     Frequency: Never     Binge frequency: Never    Drug use: Never    Sexual activity: Not on file   Lifestyle    Physical activity     Days per week: Not on file     Minutes per session: Not on file    Stress: Not on file   Relationships    Social connections     Talks on phone: Not on file     Gets together: Not on file     Attends [employer] service: Not on file     Active member of club or organization: Not on file     Attends 31.5*    280   LYMPHOPCT 24 16*   MONOPCT 4 3     BMP:   Recent Labs     21  0546 21  0651    137   K 4.0 3.5*    101   CO2 27 26   BUN 16 16   CREATININE 0.92 0.72   MG 2.3 2.3     Hepatic Function Panel:   Recent Labs     21  0546 21  0651   LABALBU 2.3* 2.6*     No results for input(s): RPR in the last 72 hours. No results for input(s): HIV in the last 72 hours. No results for input(s): BC in the last 72 hours. Lab Results   Component Value Date    MUCUS NOT REPORTED 2020    RBC 3.29 2021    TRICHOMONAS NOT REPORTED 2020    WBC 10.8 2021    YEAST NOT REPORTED 2020    TURBIDITY CLEAR 2020     Lab Results   Component Value Date    CREATININE 0.72 2021    GLUCOSE 89 2021       Medical Decision Making-Imagin20    Medical Decision Bfeusj-Ofusasrs-Thziv:       Medical Decision Making-Other:     Note:  · Labs, medications, radiologic studies were reviewed with personal review of films  · Large amounts of data were reviewed  · Discussed with nursing Staff, Discharge planner  · Infection Control and Prevention measures reviewed  · All prior entries were reviewed  · Administer medications as ordered  · Prognosis: Guarded  · Discharge planning reviewed  · Follow up as outpatient. Horacio De Leon MS4 participated in the evaluation of this patient under my direct supervision.       Rayne Murry MD

## 2021-01-07 NOTE — PROGRESS NOTES
(1.905 m)   Wt 183 lb 12.8 oz (83.4 kg)   SpO2 98%   BMI 22.97 kg/m²   8-24 HR RANGE-  TEMP Temp  Av.9 °F (36.6 °C)  Min: 97.6 °F (36.4 °C)  Max: 98.4 °F (16.3 °C)   BP Systolic (69ONQ), KFB:98 , Min:81 , KYR:530      Diastolic (47ACX), KNS:91, Min:47, Max:74     PULSE Pulse  Av.9  Min: 77  Max: 95   RR No data recorded   O2 SAT SpO2  Av %  Min: 98 %  Max: 98 %   OXYGEN DELIVERY O2 Flow Rate (L/min)  Av L/min  Min: 4 L/min  Max: 4 L/min     Systemic Examination:   General appearance - looks comfortable and in no acute distress  Eyes - pupils equal and reactive, extraocular eye movements intact  Mouth - mucous membranes moist, pharynx normal without lesions  Neck - supple, no significant adenopathy  Chest -breath sounds diminished at bases, occasional crackles  Heart - normal rate, regular rhythm, normal S1, S2, no murmurs, rubs, clicks or gallops  Abdomen - soft, nontender, nondistended, no masses or organomegaly  Neurological -awake and alert, slightly confused, no focal findings or movement disorder noted  Extremities - peripheral pulses normal, no pedal edema, no clubbing or cyanosis  Skin - normal coloration and turgor, no rashes, no suspicious skin lesions noted     DATA REVIEW     Medications:  Scheduled Meds:   warfarin  5 mg Oral Once    potassium chloride  20 mEq Oral Once    neomycin-bacitracin-polymyxin   Topical BID    enoxaparin  1 mg/kg Subcutaneous BID    dextromethorphan-guaiFENesin  10 mL Oral 4 times per day    warfarin (COUMADIN) daily dosing (placeholder)   Other RX Placeholder    multivitamin  1 tablet Oral Daily    midodrine  5 mg Oral TID WC    sodium chloride flush  10 mL Intravenous 2 times per day    ipratropium-albuterol  1 ampule Inhalation Q4H WA    digoxin  125 mcg Oral Daily    dilTIAZem  120 mg Oral Daily    finasteride  5 mg Oral Daily    levothyroxine  75 mcg Oral Daily    tamsulosin  0.8 mg Oral Nightly     Continuous Infusions:    LABS:-  ABG: No results for input(s): POCPH, POCPCO2, POCPO2, POCHCO3, MSYB4KWO in the last 72 hours. CBC:   Recent Labs     01/05/21  0546 01/06/21  0651 01/07/21  0631   WBC 9.4 10.8 11.0   HGB 8.7* 9.5* 8.7*   HCT 29.4* 31.5* 28.2*   MCV 96.4 95.7 94.6    280 316   LYMPHOPCT 24 16* 18*   RBC 3.05* 3.29* 2.98*   MCH 28.5 28.9 29.2   MCHC 29.6 30.2 30.9   RDW 19.9* 19.8* 20.5*     BMP:   Recent Labs     01/05/21  0546 01/06/21  0651 01/07/21  0631    137 139   K 4.0 3.5* 3.8    101 103   CO2 27 26 27   BUN 16 16 19   CREATININE 0.92 0.72 0.87   GLUCOSE 91 89 92   PHOS 3.1 2.9 3.1     Liver Function Test:   Recent Labs     01/07/21  0631   LABALBU 2.8*     Amylase/Lipase:  No results for input(s): AMYLASE, LIPASE in the last 72 hours. Coagulation Profile:   Recent Labs     01/05/21  0546 01/06/21  0651 01/07/21  0631   INR 1.5 1.5 1.6   PROTIME 15.8* 15.1* 16.5*     Cardiac Enzymes:  No results for input(s): CKTOTAL, CKMB, CKMBINDEX, TROPONINI in the last 72 hours. Lactic Acid:  No results found for: LACTA  BNP:   No results found for: BNP  D-Dimer:  Lab Results   Component Value Date    DDIMER 1.04 12/25/2020     Others:   No results found for: TSH, L6SASVF, G6FTOZC, THYROIDAB, FT3, T4FREE  No results found for: BC, RHEUMFACTOR, SEDRATE, CRP  No results found for: Erin Mcclellan  Lab Results   Component Value Date    IRON 25 (L) 12/28/2020    TIBC 111 (L) 12/28/2020    FERRITIN 837 (H) 12/28/2020     No results found for: SPEP, UPEP  No results found for: PSA, CEA, , WO0556,     Input/Output:  No intake or output data in the 24 hours ending 01/07/21 0956    Microbiology:  No results for input(s): SPECDESC, SPECDESC, SPECIAL, CULTURE, CULTURE, STATUS, ORG, CDIFFTOXPCR, CAMPYLOBPCR, SALMONELLAPC, SHIGAPCR, SHIGELLAPCR, MPNEUG, MPNEUM, LACTOQL in the last 72 hours.     Pathology:    Radiology reports:  XR CHEST PORTABLE   Final Result   Stable chest      Moderate left perihilar left basilar pleuroparenchymal process      Mild streaky right perihilar/right basilar atelectasis         XR CHEST (SINGLE VIEW FRONTAL)   Final Result   Persistent left lower lobe opacification concerning for pneumonia. Interval   improvement of right basilar infiltrate. Left perihilar atelectasis. XR CHEST PORTABLE   Final Result   Persistent findings of aspiration or pneumonia near the bases, appearing   worsened in the right base and decreased in the lingula. XR CHEST (2 VW)   Final Result   Similar findings to those seen on recent CT, compatible with pneumonia and   some basilar atelectasis/consolidation. CT CHEST PULMONARY EMBOLISM W CONTRAST   Final Result   No central or segmental pulmonary embolus. Bilateral lower lobe consolidation and scattered bilateral ground-glass   nodular opacities are likely on the basis of pneumonia. Recommend follow-up. XR CHEST PORTABLE   Final Result   Subtle patchy bilateral lung opacities increased from the prior study are   likely on the basis of pneumonia. Echocardiogram:   No results found for this or any previous visit. Cardiac Catheterization:   No results found for this or any previous visit. ASSESSMENT AND PLAN     Assessment:    // Acute hypoxemic respiratory failure, improving  // Bilateral multifocal pneumonia  // Paroxysmal atrial fibrillation  // Dementia  // Essential hypertension  // BPH  // Hypothyroidism    Plan:     Patient slightly hypotensive and heart rate in 90s. Baseline heart rate in 60s. Appears slightly dehydrated. Will give 500 mL normal saline bolus.  He has been weaned off high flow nasal cannula, currently at 3 L/min which is his home oxygen.  Continue supplemental oxygen to keep oxygen saturation greater than 92%   Agree with anticoagulation with Lovenox and bridging to warfarin. Management of anticoagulation for A. fib per primary. Pharmacy to dose warfarin.    Completed course of Unasyn today for aspiration pneumonia. Monitor off antibiotics.  Okay to be discharged from pulmonary standpoint after achieving therapeutic INR.  Follow-up with pulmonology in the office in 4 weeks. It was my pleasure to evaluate Po Agustin today. We will continue to follow. I would like to thank you for allowing me to participate in the care of this patient. Please feel free to call with any further questions or concerns. Deedee Maldonado M.D. Internal medicine resident, PGY 2  Pulmonary and Critical Care Medicine           1/7/2021   Attending Physician Statement  I have discussed the care of Terrell Lopez, including pertinent history and exam findings,  with the resident. I have seen and examined the patient and the key elements of all parts of the encounter have been performed by me. I agree with the assessment, plan and orders as documented by the resident with additions . Treatment plan Discussed with nursing staff in detail , all questions answered . Electronically signed by Ria Omalley MD on   1/7/21 at 5:11 PM EST    Please note that this chart was generated using voice recognition Dragon dictation software. Although every effort was made to ensure the accuracy of this automated transcription, some errors in transcription may have occurred.

## 2021-01-07 NOTE — PROGRESS NOTES
Pharmacy Note  Warfarin Consult follow-up      Recent Labs     01/07/21  0631   INR 1.6     Recent Labs     01/05/21  0546 01/06/21  0651 01/07/21  0631   HGB 8.7* 9.5* 8.7*   HCT 29.4* 31.5* 28.2*    280 316     Discontinued drug-drug interactions: Unasyn  Current warfarin drug-drug interactions: acetaminophen, enoxaparin, levothyroxine    Date INR Dose   12/25 2.1 None   12/26 2.1 2 mg    12/27 1.4 5 mg   12/28/2020 1.2 3mg   12/29/2020 1.1 3mg   12/30/2020 1.2 3 mg    12/31/2020 1.3 5mg   01/01/21 1.5 4mg   01/02/21 1.7 4mg   01/03/21 1.8 4mg   1/4/2021 1.8 3 mg    1/5/2021 1.5 5 mg    1/6/2021 1.5 5mg   1/7/2021 1.6 5mg     Notes:  Give warfarin 5mg today on 1/7/2021. Daily PT/INR while inpatient.      Octaviano Solo RPh, NISHA  Clinical Pharmacist Medication Management  1/7/2021  9:45 AM

## 2021-01-07 NOTE — CARE COORDINATION
Transitional Planning  Pt's dtr Debra left vm with two choices  E-referral sent   [facility] of [facility] [facility] to check status of referral state they did not receive. Check system and fax failed  Refaxed referrals   [facility] called back they cannot take [facility] spoke with Court Kin they need copy of pt new insurance card  [employer] card  Await call back  Will review clinicals    17:10  Manolo Gomes at [facility] they can accept; They will run Insurance call her in am after they have verified Insurance to set up time  Called pt's daughter Clarence Moss [phone]  updated on plan for discharge tomorrow [address] would like to know how many paid days he will have in SNF check with [facility] in am.  Call Dtr if no answer leave voicemail to let her know how many days he has left.

## 2021-01-07 NOTE — PROGRESS NOTES
[facility]  Office: [address], DO, Josette Wilson, DO, Carmen Maker, DO, Jennifer Sizeuma Blood, DO, Kenia Reyes MD, Cassi Rowland MD, Renetta Harrison MD, Andrew Morrell MD, Bryan Reza MD, Randy Del Rosario MD, Genaro Graham MD, June Eller MD, Leandra Frazier MD, Jeff Chaudhary DO, Alex Hoff MD, Britany Meraz MD, Elan Maynard DO, Yaya Trimble MD,  Cyril Mckay DO, Fox Al MD, Raphael Jacobson MD, Ravin Mcwilliams CNP, [facility], CNP, Joanne Constantino CNP, Ortega Condon, CNS, Tamika Torres, CNP, Tommy Ferreira, CNP, Hiwot Nava, CNP, Elvis Chery, CNP, Trina Smith, CNP, Zack Fletcher PA-C, Jelani Gonzalez, [facility], Juvencio Bass, CNP, Neto Villegas, CNP, Rianna Horne, CNP, Osiel Mckay, [facility], Aung Zazueta, [address] [place] [address]    Progress Note    1/7/2021    11:14 AM    Name:   Elizabeth Monroe  MRN:     [id number]     Acct:      [de-identified]   Room:   [facility] Day:  13  Admit Date:  12/25/2020 11:29 AM    PCP:   Adrian Cintron MD  Code Status:  Full Code    Subjective:     C/C:   Chief Complaint   Patient presents with    Respiratory Distress     Interval History Status: not changed. Patient seen and examined  Patient feels weak patient is poor historian  Patient evaluated by cardiology for V. tach  echo  Patient denies fever chest pain   I am seeing the patient for respiratory failure    Brief History: This is an 82 yr old male with AFib (chronically anticoagulated with warfarin), HTN, BPH, hypothyroidism, dementia who presents from [facility] ER with shortness of breath. At baseline, patient is oxygen dependent at [address] EMS was called from patient's nursing facility for increased shortness of breath. Pulse Ox on EMS arrival: 85% on 4L. Patient is a poor historian, thus most information is collected from EHR.   Patient recently discharged from our service on 12/18/2020 after treatment for pneumonia and supra therapeutic INR. Patient was discharged on PO doxycycline. [facility]. WBC elevated at 21.6, Trop: 34-->32, pro-bnp: 488, glucose: 114, HGB: 10.8, INR: 1.9, PT: 19.9, PTT: 37.4, D-Dimer: 1.04. CT Chest negative for PE and COVID-19 swab negative. Patient was given 10mg SC Vitamin K for reported INR: 19.9 (do not see this value represented) and was given 1 dose of IV zosyn and Cipro. Patient is transferred to our facility for admission and continued management of Pneumonia.      Medications: Allergies:  No Known Allergies    Current Meds:   Scheduled Meds:    warfarin  5 mg Oral Once    furosemide  20 mg Intravenous Once    potassium chloride  20 mEq Oral Once    neomycin-bacitracin-polymyxin   Topical BID    enoxaparin  1 mg/kg Subcutaneous BID    dextromethorphan-guaiFENesin  10 mL Oral 4 times per day    warfarin (COUMADIN) daily dosing (placeholder)   Other RX Placeholder    multivitamin  1 tablet Oral Daily    midodrine  5 mg Oral TID WC    sodium chloride flush  10 mL Intravenous 2 times per day    ipratropium-albuterol  1 ampule Inhalation Q4H WA    digoxin  125 mcg Oral Daily    dilTIAZem  120 mg Oral Daily    finasteride  5 mg Oral Daily    levothyroxine  75 mcg Oral Daily    tamsulosin  0.8 mg Oral Nightly     Continuous Infusions:   PRN Meds: diphenhydrAMINE, potassium chloride **OR** potassium alternative oral replacement **OR** potassium chloride, nicotine, promethazine **OR** ondansetron, magnesium hydroxide, acetaminophen **OR** acetaminophen, albuterol    Data:     Past Medical History:   has a past medical history of Atherosclerosis, Benign prostatic hyperplasia, Hypertension, Hypothyroidism, Osteoporosis, Paroxysmal A-fib ([address] Pneumonia, Rhabdomyolysis, Smoking, Spondylolisthesis, and Vitamin D deficiency. Social History:   reports that he has never smoked.  He has never used smokeless General appearance:  Alert   Lungs: Bilateral crackles  Heart:  regular rate and rhythm, no murmur  Abdomen:  soft, nontender, nondistended, normal bowel sounds, no masses, hepatomegaly, splenomegaly  Extremities:  no edema, redness, tenderness in the calves  Skin:  no gross lesions, rashes, induration    Assessment:        Hospital Problems           Last Modified POA    * (Principal) Community acquired bacterial pneumonia 12/26/2020 Yes    Hypertension 12/25/2020 Yes    Hypothyroidism 12/25/2020 Yes    Benign prostatic hyperplasia 12/25/2020 Yes    Paroxysmal atrial fibrillation ([address] 12/25/2020 Yes    Chronic anticoagulation 12/25/2020 Yes    Lactic acidosis 12/26/2020 Yes    Sepsis ([address] 12/26/2020 Yes    Acute respiratory failure with hypoxia ([address] 12/27/2020 Yes    Moderate malnutrition ([address] 12/31/2020 Yes          Plan:        #1 pneumonia probably aspiration pneumonia associated with acute hypoxemic respiratory failure most likely patient was treated with oxygen IV antibiotics  ID recommendation appreciated plan for IV antibiotic until 1/5/2021 Unasyn      #2 aspiration speech therapy was consulted dental soft diet with nectar thick    #2 acute hypokalemia replaced    #3 abnormal UA positive for Candida most likely colonization      #5 paroxysmal A. fib patient had supratherapeutic INR was elevated with vitamin K currently INR is subtherapeutic currently patient on Coumadin and Lovenox  Continue Cardizem  Lab Results   Component Value Date    INR 1.6 01/07/2021    INR 1.5 01/06/2021    INR 1.5 01/05/2021    PROTIME 16.5 (H) 01/07/2021    PROTIME 15.1 (H) 01/06/2021    PROTIME 15.8 (H) 01/05/2021       #6 history of BPH continue Flomax  BladderScan every shift straight cath for urinary retention greater than 250    #7 anemia probably anemia of chronic disease age-appropriate cancer screening and work-up as outpatient transfuse if hemoglobin below 7    #8 episode of V. tach cardiology was consulted

## 2021-01-07 NOTE — PROGRESS NOTES
read as worsening pneumonia. WBC elevated at 21.6>20.6, lactic acid 3.5,Trop: 32>30>29, pro-bnp: 488 D-Dimer: 1.04. CT Chest negative for PE and COVID-19 swab negative. Patient was given 1 dose of IV Zosyn and Cipro in the emergency room   Patient was transferred to [facility]. Vs for continued management of Pneumonia.      CURRENT EVALUATION 01/07/21    Afebrile  VS stable    The patient seen and evaluated at bedside. Patient is better with no new acute issues or concerns today. Patient does not have any fevers, chills, chest pains or palpitations. He notes mild cough and some shortness of breath. He remains somewhat confused. RR 20  On 4 L NC  02 sat 98    Expiratory wheezing on lung auscultation. Monitor off antibiotics  · 7 day course of unasyn completed 1/5/21      Pulmonary cleared pt for discharge. CXR:   · 12/17/20: Subtle opacities from pneumonia  · 12/26/20 Pneumonia with basilar consolidation or atelectasis       CT chest 12/25/20  Bilateral lower lobe consolidation and scattered bilateral ground-glass   nodular opacities are likely on the basis of pneumonia       Labs, X rays reviewed: 1/7/2021    BUN:25>17>18-->11-->12-->16-->19  Cr:1.16>0.85>0.72-->0.83-->0.82-->0.92-->0.87    WBC: 12.0-->9.2-->--8.6>7.3-->9.4-->10.8-->11.0  Hb: 9.5-->9.6-->9.5-->9.1-->8.8-->8.7-->9.5-->8.7  Plat: 320-->286-->257-->222-->236-->280-->316    Cultures:  Urine:  · Strept pneumonia antigen negative   · Legionella antigen negative    Blood:   · 12/25/20- no growth for 6 days  ·   Sputum :  ·   Wound:  ·     Discussed with patient, RN        I have personally reviewed the past medical history, past surgical history, medications, social history, and family history, and I have updated the database accordingly.   Past Medical History:     Past Medical History:   Diagnosis Date    Atherosclerosis     Benign prostatic hyperplasia     Hypertension     Hypothyroidism     Osteoporosis     Parox[facility]mal A[facility]fib ([facility]     organization: Not on file     Attends meetings of clubs or organizations: Not on file     Relationship status: Not on file    Intimate partner violence     Fear of current or ex partner: Not on file     Emotionally abused: Not on file     Physically abused: Not on file     Forced sexual activity: Not on file   Other Topics Concern    Not on file   Social History Narrative    Not on file       Family History:   History reviewed. No pertinent family history. Allergies:   Patient has no known allergies. Review of Systems:   Unable to obtain much history from patient. Physical Examination :     No data found. General Appearance: Awake, alert, confused (not oriented to location)  Head:  Normocephalic, no trauma  Eyes: Pupils equal, round, reactive to light and accommodation; extraocular movements intact; sclera anicteric; conjunctivae pink. No embolic phenomena. ENT: Oropharynx clear, without erythema, exudate, or thrush. No tenderness of sinuses. Mouth/throat: mucosa pink and moist. No lesions. Dentition in good repair. Neck:Supple, without lymphadenopathy. Thyroid normal, No bruits. Pulmonary/Chest: expiratory wheezing. No dullness to percussion. Cardiovascular: irregular rhythm without murmurs, rubs, or gallops. Abdomen: Soft, non tender. Bowel sounds normal. No organomegaly  All four Extremities: No cyanosis, clubbing, edema, or effusions. Neurologic: No gross sensory or motor deficits. Skin: Warm and dry with good turgor. No signs of peripheral arterial or venous insufficiency. No ulcerations. No open wounds.     Medical Decision Making -Laboratory:   I have independently reviewed/ordered the following labs:    CBC with Differential:   Recent Labs     01/06/21  0651 01/07/21  0631   WBC 10.8 11.0   HGB 9.5* 8.7*   HCT 31.5* 28.2*    316   LYMPHOPCT 16* 18*   MONOPCT 3 4     BMP:   Recent Labs     01/06/21  0651 01/07/21  0631    139   K 3.5* 3.8    103   CO2 26 27   BUN 16 19 CREATININE 0.72 0.87   MG 2.3 2.1     Hepatic Function Panel:   Recent Labs     21  0651 21  0631   LABALBU 2.6* 2.8*     No results for input(s): RPR in the last 72 hours. No results for input(s): HIV in the last 72 hours. No results for input(s): BC in the last 72 hours. Lab Results   Component Value Date    MUCUS NOT REPORTED 2020    RBC 2.98 2021    TRICHOMONAS NOT REPORTED 2020    WBC 11.0 2021    YEAST NOT REPORTED 2020    TURBIDITY CLEAR 2020     Lab Results   Component Value Date    CREATININE 0.87 2021    GLUCOSE 92 2021       Medical Decision Making-Imagin20    Medical Decision Fysoqr-Cbvospvz-Aytqx:       Medical Decision Making-Other:     Note:  · Labs, medications, radiologic studies were reviewed with personal review of films  · Large amounts of data were reviewed  · Discussed with nursing Staff, Discharge planner  · Infection Control and Prevention measures reviewed  · All prior entries were reviewed  · Administer medications as ordered  · Prognosis: Guarded  · Discharge planning reviewed  · Follow up as outpatient. Danilo Mccloud, MS4 participated in the evaluation of this patient under my direct supervision.       Sukhwinder Anderson MD

## 2021-01-07 NOTE — FLOWSHEET NOTE
DATE: 2021    NAME: Lisa Bhakta  MRN: [id number]   : 1935    Patient not seen this date for Physical Therapy due to:  [] Blood transfusion in progress  [] Hemodialysis  [x] Patient Declined: Pt states he is too tired for therapy right now. Will check back if time allows. [] Spine Precautions   [] Strict Bedrest  [] Surgery/ Procedure  [] Testing      [] Other        [] PT is being discontinued at this time. Patient independent. No further needs. [] PT is being discontinued at this time due to declining physical/ medical status. Therapy is not appropriate at this time.     Dwight Gomez, PTA

## 2021-01-07 NOTE — PROGRESS NOTES
[facility] - Progress Note    Today's Date and Time: 1/7/2021, 9:12 AM    Impression :   · AFib (chronically anticoagulated with warfarin)  · HTN  · BPH  · Hypothyroidism  · Dementia   · Pressure wound on coccyx. · Aspiration pneumonia  · Single blood culture with Coagulase negative Staphylococci on 12/15/2020  · Covid tests:  · 12/14/2020 -negative  · 12/25/2020 negative    Recommendations:   · Monitor off antibiotics:  · Completed 7 days of unasyn 1/5/21  · Monitor clinical response off antibiotics  · Wound care    Medical Decision Making/Summary/Discussion:1/7/2021     ·   Infection Control Recommendations   · [place] Precautions  · Contact Isolation MRSA    Antimicrobial Stewardship Recommendations     · Simplification of therapy    Coordination of Outpatient Care:   · Estimated Length of IV antimicrobials:TBD  · Patient will need Midline Catheter Insertion: No  · Patient will need PICC line Insertion:No  · Patient will need: Home IV , [employer],  SNF,  LTAC:TBD  · Patient will need outpatient wound care:Yes    Chief complaint/reason for consultation:   · Pneumonia  · Sacral decubiti    History of Present Illness:   Leanna Zamudio is a 82y.o.-year-old white male who was initially admitted on 12/25/2020. Patient seen at the request of Dr. Aurora Ho DO.    INITIAL HISTORY:    This is a patient with AFib (chronically anticoagulated with warfarin), HTN, BPH, hypothyroidism, dementia who presents from [facility] ER with shortness of breath. Patient is a poor historian, thus most information is collected from EMR. EMS was called from patient's nursing facility because of increased shortness of breath. Patient uses 4 L oxygen at home,  he was saturating  85% on 4L. Past Medical History:     Past Medical History:   Diagnosis Date    Atherosclerosis     Benign prostatic hyperplasia     Hypertension     Hypothyroidism     Osteoporosis     Paroxysmal A-fib ([address]     Pneumonia     Rhabdomyolysis     Smoking     Spondylolisthesis     Vitamin D deficiency        Past Surgical  History:     Past Surgical History:   Procedure Laterality Date    ANKLE SURGERY      HIP SURGERY      TONSILLECTOMY AND ADENOIDECTOMY         Medications:      sodium chloride  500 mL Intravenous Once    potassium chloride  20 mEq Oral Once    neomycin-bacitracin-polymyxin   Topical BID    enoxaparin  1 mg/kg Subcutaneous BID    dextromethorphan-guaiFENesin  10 mL Oral 4 times per day    warfarin (COUMADIN) daily dosing (placeholder)   Other RX Placeholder    multivitamin  1 tablet Oral Daily    midodrine  5 mg Oral TID WC    sodium chloride flush  10 mL Intravenous 2 times per day    ipratropium-albuterol  1 ampule Inhalation Q4H WA    digoxin  125 mcg Oral Daily    dilTIAZem  120 mg Oral Daily    finasteride  5 mg Oral Daily    levothyroxine  75 mcg Oral Daily    tamsulosin  0.8 mg Oral Nightly       Social History:     Social History     Socioeconomic History    Marital status:      Spouse name: Not on file    Number of children: Not on file    Years of education: Not on file    Highest education level: Not on file   Occupational History    Not on file   Social Needs    Financial resource strain: Not on file    Food insecurity     Worry: Not on file     Inability: Not on file   [employer] needs     Medical: Not on file     Non-medical: Not on file   Tobacco Use    Smoking status: Never Smoker    Smokeless tobacco: Never Used   Substance and Sexual Activity    Alcohol use: Never     Frequency: Never     Binge frequency: Never    Drug use: Never    Sexual activity: Not on file   Lifestyle    Physical activity     Days per week: Not on file Minutes per session: Not on file    Stress: Not on file   Relationships    Social connections     Talks on phone: Not on file     Gets together: Not on file     Attends [employer]ism service: Not on file     Active member of club or organization: Not on file     Attends meetings of clubs or organizations: Not on file     Relationship status: Not on file    Intimate partner violence     Fear of current or ex partner: Not on file     Emotionally abused: Not on file     Physically abused: Not on file     Forced sexual activity: Not on file   Other Topics Concern    Not on file   Social History Narrative    Not on file       Family History:   History reviewed. No pertinent family history. Allergies:   Patient has no known allergies. Review of Systems:   Unable to obtain much history from patient. Physical Examination :     Patient Vitals for the past 8 hrs:   SpO2   01/07/21 0905 98 %     General Appearance: Awake, alert, confused (not oriented to location)  Head:  Normocephalic, no trauma  Eyes: Pupils equal, round, reactive to light and accommodation; extraocular movements intact; sclera anicteric; conjunctivae pink. No embolic phenomena. ENT: Oropharynx clear, without erythema, exudate, or thrush. No tenderness of sinuses. Mouth/throat: mucosa pink and moist. No lesions. Dentition in good repair. Neck:Supple, without lymphadenopathy. Thyroid normal, No bruits. Pulmonary/Chest: expiratory wheezing. No dullness to percussion. Cardiovascular: irregular rhythm without murmurs, rubs, or gallops. Abdomen: Soft, non tender. Bowel sounds normal. No organomegaly  All four Extremities: No cyanosis, clubbing, edema, or effusions. Neurologic: No gross sensory or motor deficits. Skin: Warm and dry with good turgor. No signs of peripheral arterial or venous insufficiency. No ulcerations. No open wounds.     Medical Decision Making -Laboratory:   I have independently reviewed/ordered the following labs: I agree with the assessment, plan and orders as documented by the resident/ student.     Cristela Elizabeth MD.

## 2021-01-08 NOTE — PROGRESS NOTES
[facility] - Progress Note    Today's Date and Time: 1/8/2021, 4:19 PM    Impression :   · AFib (chronically anticoagulated with warfarin)  · HTN  · BPH  · Hypothyroidism  · Dementia   · Pressure wound on coccyx. · Aspiration pneumonia  · Single blood culture with Coagulase negative Staphylococci on 12/15/2020  · Covid tests:  · 12/14/2020 -negative  · 12/25/2020 negative    Recommendations:   · Monitor off antibiotics:  · Completed 7 days of unasyn 1/5/21  · Monitor clinical response off antibiotics  · Wound care    Medical Decision Making/Summary/Discussion:1/8/2021     ·   Infection Control Recommendations   · [place] Precautions  · Contact Isolation MRSA    Antimicrobial Stewardship Recommendations     · Simplification of therapy    Coordination of Outpatient Care:   · Estimated Length of IV antimicrobials:TBD  · Patient will need Midline Catheter Insertion: No  · Patient will need PICC line Insertion:No  · Patient will need: Home IV , [place],  SNF,  LTAC:TBD  · Patient will need outpatient wound care:Yes    Chief complaint/reason for consultation:   · Pneumonia  · Sacral decubiti    History of Present Illness:   Lisa Bhakta is a 82y.o.-year-old white male who was initially admitted on 12/25/2020. Patient seen at the request of Dr. Bal Little DO.    INITIAL HISTORY:    This is a patient with AFib (chronically anticoagulated with warfarin), HTN, BPH, hypothyroidism, dementia who presents from [facility] ER with shortness of breath. Patient is a poor historian, thus most information is collected from EMR. EMS was called from patient's nursing facility because of increased shortness of breath. Patient uses 4 L oxygen at home,  he was saturating  85% on 4L. Patient was recently discharged from hospital on 12/18/2020 after treatment for pneumonia and supra therapeutic INR. Patient was discharged on PO doxycycline.   CXR done at [facility]  Pneumonia     Rhabdomyolysis     Smoking     Spondylolisthesis     Vitamin D deficiency        Past Surgical  History:     Past Surgical History:   Procedure Laterality Date    ANKLE SURGERY      HIP SURGERY      TONSILLECTOMY AND ADENOIDECTOMY         Medications:      warfarin  7.5 mg Oral Once    furosemide  40 mg Oral Daily    potassium chloride  20 mEq Oral Once    neomycin-bacitracin-polymyxin   Topical BID    enoxaparin  1 mg/kg Subcutaneous BID    dextromethorphan-guaiFENesin  10 mL Oral 4 times per day    warfarin (COUMADIN) daily dosing (placeholder)   Other RX Placeholder    multivitamin  1 tablet Oral Daily    midodrine  5 mg Oral TID WC    sodium chloride flush  10 mL Intravenous 2 times per day    ipratropium-albuterol  1 ampule Inhalation Q4H WA    digoxin  125 mcg Oral Daily    dilTIAZem  120 mg Oral Daily    finasteride  5 mg Oral Daily    levothyroxine  75 mcg Oral Daily    tamsulosin  0.8 mg Oral Nightly       Social History:     Social History     Socioeconomic History    Marital status:      Spouse name: Not on file    Number of children: Not on file    Years of education: Not on file    Highest education level: Not on file   Occupational History    Not on file   Social Needs    Financial resource strain: Not on file    Food insecurity     Worry: Not on file     Inability: Not on file    Transportation needs     Medical: Not on file     Non-medical: Not on file   Tobacco Use    Smoking status: Never Smoker    Smokeless tobacco: Never Used   Substance and Sexual Activity    Alcohol use: Never     Frequency: Never     Binge frequency: Never    Drug use: Never    Sexual activity: Not on file   Lifestyle    Physical activity     Days per week: Not on file     Minutes per session: Not on file    Stress: Not on file   Relationships    Social connections     Talks on phone: Not on file     Gets together: Not on file     Attends Mu-ism service: Not on file     Active member of club or organization: Not on file     Attends meetings of clubs or organizations: Not on file     Relationship status: Not on file    Intimate partner violence     Fear of current or ex partner: Not on file     Emotionally abused: Not on file     Physically abused: Not on file     Forced sexual activity: Not on file   Other Topics Concern    Not on file   Social History Narrative    Not on file       Family History:   History reviewed. No pertinent family history. Allergies:   Patient has no known allergies. Review of Systems:   Unable to obtain much history from patient. Physical Examination :     Patient Vitals for the past 8 hrs:   BP Temp Temp src Pulse Resp SpO2 Height   01/08/21 1127 113/60 97.3 °F (36.3 °C) Oral 88 15 97 % --   01/08/21 1103 -- -- -- -- -- -- 6' 3\" (1.905 m)   01/08/21 0926 -- -- -- -- 20 100 % --   01/08/21 0821 97/79 97.5 °F (36.4 °C) Oral 78 15 94 % --   01/08/21 0820 -- -- -- 71 -- 95 % --     General Appearance: Awake, alert, confused (not oriented to location)  Head:  Normocephalic, no trauma  Eyes: Pupils equal, round, reactive to light and accommodation; extraocular movements intact; sclera anicteric; conjunctivae pink. No embolic phenomena. ENT: Oropharynx clear, without erythema, exudate, or thrush. No tenderness of sinuses. Mouth/throat: mucosa pink and moist. No lesions. Dentition in good repair. Neck:Supple, without lymphadenopathy. Thyroid normal, No bruits. Pulmonary/Chest: expiratory wheezing. No dullness to percussion. Cardiovascular: irregular rhythm without murmurs, rubs, or gallops. Abdomen: Soft, non tender. Bowel sounds normal. No organomegaly  All four Extremities: No cyanosis, clubbing, edema, or effusions. Neurologic: No gross sensory or motor deficits. Skin: Warm and dry with good turgor. No signs of peripheral arterial or venous insufficiency. No ulcerations. No open wounds.     Medical Decision Making -Laboratory: I have independently reviewed/ordered the following labs:    CBC with Differential:   Recent Labs     21  0631 21  0555   WBC 11.0 8.2   HGB 8.7* 8.9*   HCT 28.2* 30.4*    314   LYMPHOPCT 18* 16*   MONOPCT 4 3     BMP:   Recent Labs     21  0631 21  0555    138   K 3.8 3.9    103   CO2 27 28   BUN 19 19   CREATININE 0.87 0.79   MG 2.1 2.1     Hepatic Function Panel:   Recent Labs     21  0631 21  0555   LABALBU 2.8* 2.9*     No results for input(s): RPR in the last 72 hours. No results for input(s): HIV in the last 72 hours. No results for input(s): BC in the last 72 hours. Lab Results   Component Value Date    MUCUS NOT REPORTED 2020    RBC 3.13 2021    TRICHOMONAS NOT REPORTED 2020    WBC 8.2 2021    YEAST NOT REPORTED 2020    TURBIDITY CLEAR 2020     Lab Results   Component Value Date    CREATININE 0.79 2021    GLUCOSE 91 2021       Medical Decision Making-Imagin20    Medical Decision Idngwq-Dkvzbroe-Pohci:       Medical Decision Making-Other:     Note:  · Labs, medications, radiologic studies were reviewed with personal review of films  · Large amounts of data were reviewed  · Discussed with nursing Staff, Discharge planner  · Infection Control and Prevention measures reviewed  · All prior entries were reviewed  · Administer medications as ordered  · Prognosis: Guarded  · Discharge planning reviewed  · Follow up as outpatient.     Lyssa Bell MD

## 2021-01-08 NOTE — PLAN OF CARE
Problem: Respiratory  Intervention: Respiratory assessment  Note: BRONCHOSPASM/BRONCHOCONSTRICTION     [x]         IMPROVE AERATION/BREATH SOUNDS  [x]   ADMINISTER BRONCHODILATOR THERAPY AS APPROPRIATE  [x]   ASSESS BREATH SOUNDS  [x]   IMPLEMENT AEROSOL/MDI PROTOCOL  [x]   PATIENT EDUCATION AS NEEDED        Problem: Skin Integrity:  Goal: Will show no infection signs and symptoms  Description: Will show no infection signs and symptoms  Outcome: Ongoing  Goal: Absence of new skin breakdown  Description: Absence of new skin breakdown  Outcome: Ongoing

## 2021-01-08 NOTE — PLAN OF CARE
Nutrition Problem #1: Moderate malnutrition, In context of acute illness or injury  Intervention: Food and/or Nutrient Delivery: Continue Oral Nutrition Supplement, Continue Current Diet  Nutritional Goals: PO intake to meet % of estimated nutrition needs

## 2021-01-08 NOTE — DISCHARGE SUMMARY
[facility]  Office: [address], DO, María George, DO, Praneeth Arroyo, DO, Dionisio Sharpesheldon Oviedo, DO, Kathi Kim MD, Karol Wei MD, John Acevedo MD, Ramirez Carroll MD, Danis Vazquez MD, Rudi Henderson MD, Eze Best MD, Emperatriz Scherer MD, Leandra Davidson MD, Emily Casey, DO, Scout Roland MD, Juvencio Sage MD, Era Hernandez, DO, Ryan Calderón MD,  Jed White DO, Felix Bright MD, Genaro Mosley MD, Diane Montgomery, [facility], [facility], CNP, [facility] Glenna, CNP, Divya Flores, CNS, Duane Bottcher, CNP, Landry Martinez, CNP, Joni Gomez, CNP, Rocky Herring, CNP, Latrice Romero, CNP, Antonio Flores PA-C, Dearl Teena, [facility], Jessica James, CNP, Teri Wooten, CNP, Shawn Madrigal, CNP, Isatu Kohler, [facility], [facility], [address] [place] [address]    Discharge Summary     Patient ID: Robyn Mcleod  :  1935   MRN: [id number]     ACCOUNT:  [de-identified]   Patient's PCP: Giovani Ta MD  Admit Date: 2020   Discharge Date: 2021     Length of Stay: 14  Code Status:  Full Code  Admitting Physician: Scout Roland MD  Discharge Physician: Scout Roland MD     Active Discharge Diagnoses:     Hospital Problem Lists:  Principal Problem:    Community acquired bacterial pneumonia  Active Problems:    Hypertension    Hypothyroidism    Benign prostatic hyperplasia    Paroxysmal atrial fibrillation (HCC)    Chronic anticoagulation    Lactic acidosis    Sepsis ([address]    Acute respiratory failure with hypoxia ([address]    Moderate malnutrition ([facility]  Resolved Problems:    * No resolved hospital problems.  *      Admission Condition:  poor     Discharged Condition: fair    Hospital Stay:     Hospital Course:  Robyn Mcleod is a 82 y.o. male who was admitted for the management of   Community acquired bacterial pneumonia , presented to ER with Respiratory Distress    #1 pneumonia probably aspiration pneumonia associated with acute hypoxemic respiratory failure most likely patient was treated with oxygen IV antibiotics  ID recommendation appreciated plan for IV antibiotic until 1/5/2021 Unasyn  Shortness of breath has significantly improved     #2 aspiration speech therapy was consulted dental soft diet with nectar thick     #2 acute hypokalemia replaced     #3 abnormal UA positive for Candida most likely colonization        #5 paroxysmal A. fib patient had supratherapeutic INR was elevated with vitamin K currently INR is subtherapeutic currently patient on Coumadin and Lovenox  Continue Cardizem  Repeat INR in 2 days  Lab Results   Component Value Date    INR 1.6 01/08/2021    INR 1.6 01/07/2021    INR 1.5 01/06/2021    PROTIME 16.5 (H) 01/08/2021    PROTIME 16.5 (H) 01/07/2021    PROTIME 15.1 (H) 01/06/2021     #6 history of BPH continue Flomax  BladderScan every shift straight cath for urinary retention greater than 250     #7 anemia probably anemia of chronic disease age-appropriate cancer screening and work-up as outpatient transfuse if hemoglobin below 7     #8 episode of V. tach cardiology was consulted echo  Probably related to hypokalemia  Follow-up with cardiology as outpatient CMP in 1 week  Repeat CBC in 1 week    #9 probable chronic diastolic CHF continue diuresis stable during hospitalization    Physical exam     Alert  Chest: Bilateral crackles  Abdomen: Nontender nondistended  CVS: S1-S2  Neurology: Moves extremity                    Significant therapeutic interventions:     Significant Diagnostic Studies:   Labs / Micro:  CBC:   Lab Results   Component Value Date    WBC 8.2 01/08/2021    RBC 3.13 01/08/2021    HGB 8.9 01/08/2021    HCT 30.4 01/08/2021    MCV 97.1 01/08/2021    MCH 28.4 01/08/2021    MCHC 29.3 01/08/2021    RDW 20.4 01/08/2021     01/08/2021     BMP:    Lab Results   Component Value Date    GLUCOSE 91 01/08/2021     01/08/2021    K 3.9 01/08/2021     01/08/2021    CO2 28 01/08/2021    ANIONGAP 7 01/08/2021    BUN 19 01/08/2021    CREATININE 0.79 01/08/2021    BUNCRER NOT REPORTED 01/08/2021    CALCIUM 8.7 01/08/2021    LABGLOM >60 01/08/2021    GFRAA >60 01/08/2021    GFR      01/08/2021    GFR NOT REPORTED 01/08/2021     HFP:    Lab Results   Component Value Date    PROT 6.4 12/25/2020     CMP:    Lab Results   Component Value Date    GLUCOSE 91 01/08/2021     01/08/2021    K 3.9 01/08/2021     01/08/2021    CO2 28 01/08/2021    BUN 19 01/08/2021    CREATININE 0.79 01/08/2021    ANIONGAP 7 01/08/2021    ALKPHOS 90 12/25/2020    ALT 28 12/25/2020    AST 20 12/25/2020    BILITOT 0.63 12/25/2020    LABALBU 2.9 01/08/2021    ALBUMIN 0.9 12/25/2020    LABGLOM >60 01/08/2021    GFRAA >60 01/08/2021    GFR      01/08/2021    GFR NOT REPORTED 01/08/2021    PROT 6.4 12/25/2020    CALCIUM 8.7 01/08/2021     PT/INR:    Lab Results   Component Value Date    PROTIME 16.5 01/08/2021    INR 1.6 01/08/2021     PTT:   Lab Results   Component Value Date    APTT 37.4 12/25/2020     FLP:  No results found for: CHOL, TRIG, HDL  U/A:    Lab Results   Component Value Date    COLORU YELLOW 12/14/2020    TURBIDITY CLEAR 12/14/2020    SPECGRAV 1.024 12/14/2020    HGBUR SMALL 12/14/2020    PHUR 5.5 12/14/2020    PROTEINU NEGATIVE 12/14/2020    GLUCOSEU NEGATIVE 12/14/2020    KETUA NEGATIVE 12/14/2020    BILIRUBINUR NEGATIVE 12/14/2020    UROBILINOGEN Normal 12/14/2020    NITRU NEGATIVE 12/14/2020    LEUKOCYTESUR NEGATIVE 12/14/2020     TSH:  No results found for: TSH     Radiology:  Xr Chest (single View Frontal)    Result Date: 12/30/2020  Persistent left lower lobe opacification concerning for pneumonia. Interval improvement of right basilar infiltrate. Left perihilar atelectasis.      Xr Chest Portable    Result Date: 1/5/2021  Stable chest Moderate left perihilar left basilar pleuroparenchymal process Mild streaky right perihilar/right basilar atelectasis       Consultations:    Consults: Final Specialist Recommendations/Findings:   PHARMACY TO DOSE WARFARIN  IP CONSULT TO INFECTIOUS DISEASES  IP CONSULT TO PULMONOLOGY  IP CONSULT TO IV TEAM  IP CONSULT TO CARDIOLOGY      The patient was seen and examined on day of discharge and this discharge summary is in conjunction with any daily progress note from day of discharge. Discharge plan:     Disposition: Rehab    Physician Follow Up: [facility]  Nicolasa [address]  [address]  On 1/19/2021  at 1:30pm for hospital follow-up with cardiology    Quan Amador MD  [address] [place] [address]  [phone]    In 1 week         Requiring Further Evaluation/Follow Up POST HOSPITALIZATION/Incidental Findings:     Diet: Continue current diet as above    Activity: As tolerated    Instructions to Patient:     Discharge Medications:      Medication List      START taking these medications    albuterol (2.5 MG/3ML) 0.083% nebulizer solution  Commonly known as: PROVENTIL  Take 3 mLs by nebulization every 2 hours as needed for Wheezing     enoxaparin 80 MG/0.8ML injection  Commonly known as: LOVENOX  Inject 0.8 mLs into the skin 2 times daily     ipratropium-albuterol 0.5-2.5 (3) MG/3ML Soln nebulizer solution  Commonly known as: DUONEB  Inhale 3 mLs into the lungs every 4 hours as needed for Shortness of Breath     midodrine 5 MG tablet  Commonly known as: PROAMATINE  Take 1 tablet by mouth 3 times daily (with meals)        CONTINUE taking these medications    acetaminophen 325 MG tablet  Commonly known as: TYLENOL     collagenase 250 UNIT/GM ointment     digoxin 125 MCG tablet  Commonly known as: LANOXIN     dilTIAZem 120 MG Tb24 extended release tablet  Commonly known as: CARDIZEM LA     ergocalciferol 1.25 MG (89544 UT) capsule  Commonly known as: ERGOCALCIFEROL     finasteride 5 MG tablet  Commonly known as: PROSCAR     levothyroxine 100 MCG tablet  Commonly known as: SYNTHROID     nystatin Powd powder  Commonly known as: MYCOSTATIN     tamsulosin 0.4 MG capsule  Commonly known as: FLOMAX     Vitamin D3 125 MCG (5000 UT) Tabs     warfarin 2 MG tablet  Commonly known as: Coumadin  Take 1 tablet by mouth daily 12/19 12/20 repeat INR 12/21        STOP taking these medications    doxycycline hyclate 100 MG tablet  Commonly known as: VIBRA-TABS     oxyCODONE-acetaminophen 5-325 MG per tablet  Commonly known as: PERCOCET           Where to Get Your Medications      [phone] [address] [address]    Phone: [phone]   · albuterol (2.5 MG/3ML) 0.083% nebulizer solution  · enoxaparin 80 MG/0.8ML injection  · ipratropium-albuterol 0.5-2.5 (3) MG/3ML Soln nebulizer solution  · midodrine 5 MG tablet         Discharge Procedure Orders   Protime-INR   Standing Status: Future Standing Exp. Date: 01/15/21     Order Specific Question Answer Comments   Daily Coumadin Dose? For A. fib        Time Spent on discharge is  35 mins in patient examination, evaluation, counseling as well as medication reconciliation, prescriptions for required medications, discharge plan and follow up. Electronically signed by   Estefania Sutherland MD  1/8/2021  10:38 AM      Thank you Dr. Yamile Lewis MD for the opportunity to be involved in this patient's care.

## 2021-01-08 NOTE — PROGRESS NOTES
Comprehensive Nutrition Assessment    Type and Reason for Visit:  Reassess    Nutrition Recommendations/Plan: Continue current Dental Soft Diet with Mildly Thick (Nectar) Liquids as tolerated. Continue with frozen oral nutrition supplement (Magic Cup) 2x/d as tolerated. Monitor/encourage intakes. Nutrition Assessment:  Spoke with RN who reports that patient's PO intake has been good. Patient has been consuming % of most meals. RN also reports that patient's family has been bringing in food for him. Stage II pressure remains - recommend to continue with frozen oral nutrition supplement for added calories/protein to promote healing. Malnutrition Assessment:  Malnutrition Status: Moderate malnutrition    Context:  Acute Illness     Findings of the 6 clinical characteristics of malnutrition:  Energy Intake:  No significant decrease in energy intake  Weight Loss:  No significant weight loss     Body Fat Loss:  1 - Mild body fat loss- Orbital, Triceps, Buccal region   Muscle Mass Loss:  1 - Mild muscle mass loss- Temples (temporalis), Clavicles (pectoralis & deltoids), Scapula (trapezius), Thigh (quadraceps)  Fluid Accumulation:  1 - Mild- Extremities, Generalized   Strength:  Not Performed    Estimated Daily Nutrient Needs:  Energy (kcal):  30-32 ~> 4742-5135 kcals/d; Weight Used for Energy Requirements:  Admission     Protein (g):  1.5-1.7 gm/kg ~>125-140 gms/d; Weight Used for Protein Requirements:  Admission          Nutrition Related Findings:  Labs reviewed. Meds reviewed: Synthroid, Lasix, MVI, KCl, Coumadin. Non-pitting generalized, +1 pitting BLE edema. Last BM 1/5.       Wounds:  Multiple, Skin Tears, Stage II, Pressure Injury       Current Nutrition Therapies:    DIET DENTAL SOFT; Mildly Thick (Nectar)  Dietary Nutrition Supplements: Frozen Oral Supplement    Anthropometric Measures:  · Height: 6' 3\" (190.5 cm)  · Current Body Weight: 186 lb 1.1 oz (84.4 kg)   · Admission Body Weight: 183 lb

## 2021-01-08 NOTE — CARE COORDINATION
Transition planning  Received call from Jocelyn Farias at [facility] [place], she states they have verified patient's insurance, they are unable to accept patient today due to their staffing asking for patient to come to facility in the morning, will request transportation for 10:00 am  time tomorrow. Report number [phone]  Pt's daughter Ruy Olivera updated on pt discharge tomorrow to [facility] [place]. Faxed face sheet,AVS, HENS and H&P to [facility] [place] [phone].

## 2021-01-08 NOTE — PROGRESS NOTES
Pharmacy Note  Warfarin Consult follow-up      Recent Labs     01/08/21  0555   INR 1.6     Recent Labs     01/06/21  0651 01/07/21  0631 01/08/21  0555   HGB 9.5* 8.7* 8.9*   HCT 31.5* 28.2* 30.4*    316 314       Significant Drug-Drug Interactions:  New warfarin drug-drug interactions: none  Discontinued drug-drug interactions: none  Current warfarin drug-drug interactions: acetaminophen, enoxaparin, levothyroxine    Date INR Dose   12/25 2.1 None   12/26 2.1 2 mg    12/27 1.4 5 mg   12/28/2020 1.2 3mg   12/29/2020 1.1 3mg   12/30/2020 1.2 3 mg    12/31/2020 1.3 5mg   01/01/21 1.5 4mg   01/02/21 1.7 4mg   01/03/21 1.8 4mg   1/4/2021 1.8 3 mg    1/5/2021 1.5 5 mg    1/6/2021 1.5 5mg   1/7/2021 1.6 5mg   1/8/2021 1.6 7.5 mg        Notes:             INR is still below goal at 1.6 today. Will increase warfarin to 7.5 mg x 1 dose this evening. Daily PT/INR while inpatient. [address] [place] [address]  Ph., CACP, Clinical Pharmacist  Anticoagulation Services, [facility]  1/8/2021  9:11 AM

## 2021-01-08 NOTE — PROGRESS NOTES
PULMONARY & CRITICAL CARE MEDICINE PROGRESS  NOTE     Patient:  Robyn Mcleod  MRN: [id number]  Admit date: 12/25/2020  Primary Care Physician: Giovani Ta MD  Consulting Physician: Scout Roland MD  CODE Status: Full Code  LOS: 14    SUBJECTIVE     I personally interviewed/examined the patient, reviewed interval history and interpreted all available radiographic, laboratory data at the time of service. Chief Compliant/Reason for Initial Consult: Pneumonia    Brief Hospital Course: The patient is a 82 y.o. male with multiple medical comorbidities including hypertension, hypothyroidism, atrial fibrillation, dementia resented to [facility] ER with worsening shortness of breath. He was hypoxemic on presentation. He was recently treated for pneumonia and supratherapeutic INR and was discharged on oral doxycycline. Chest x-ray showed worsening pulmonary infiltrates patient had leukocytosis. CT chest was negative for PE. His Covid test was negative. He was on cefepime, which has been changed to Unasyn. Interval History:  01/08/21  Patient seen and examined bedside. Labs and chart reviewed. No acute overnight events. Patient is awake alert and oriented x3. Denies any complaints at this time. Patient is currently maintaining saturations on nasal cannula 3 L which is his home oxygen. No leukocytosis. Afebrile. Hemodynamically stable. Slightly hypotensive and heart rate with SBP 90s. Complete 7-day course of Unasyn for aspiration pneumonia. Continuing on therapeutic dose Lovenox for A. fib. bridging with warfarin per primary. INR 1.6 today. Subtherapeutic.     Review of Systems:  Review of Systems   Unable to perform ROS: Dementia     OBJECTIVE     VITAL SIGNS:   LAST-  BP 97/79   Pulse 78   Temp 97.5 °F (36.4 °C) (Oral)   Resp 20   Ht 6' 3\" (1.905 m)   Wt 183 lb 12.8 oz (83.4 kg)   SpO2 100%   BMI 22.97 kg/m²   8-24 HR RANGE-  TEMP Temp  Av.8 °F (36.6 °C)  Min: 97.5 °F (36.4 °C)  Max: 98 °F (79.0 °C)   BP Systolic (68RFM), FNP:81 , Min:89 , WUN:199      Diastolic (22RPW), EJQ:85, Min:51, Max:79     PULSE Pulse  Av.4  Min: 71  Max: 94   RR Resp  Av.5  Min: 15  Max: 20   O2 SAT SpO2  Av.3 %  Min: 94 %  Max: 100 %   OXYGEN DELIVERY O2 Flow Rate (L/min)  Av L/min  Min: 4 L/min  Max: 4 L/min     Systemic Examination:   General appearance - looks comfortable and in no acute distress  Eyes - pupils equal and reactive, extraocular eye movements intact  Mouth - mucous membranes moist, pharynx normal without lesions  Neck - supple, no significant adenopathy  Chest -breath sounds diminished at bases, occasional crackles  Heart - normal rate, regular rhythm, normal S1, S2, no murmurs, rubs, clicks or gallops  Abdomen - soft, nontender, nondistended, no masses or organomegaly  Neurological -awake and alert, slightly confused, no focal findings or movement disorder noted  Extremities - peripheral pulses normal, no pedal edema, no clubbing or cyanosis  Skin - normal coloration and turgor, no rashes, no suspicious skin lesions noted     DATA REVIEW     Medications:  Scheduled Meds:   warfarin  7.5 mg Oral Once    furosemide  40 mg Oral Daily    potassium chloride  20 mEq Oral Once    neomycin-bacitracin-polymyxin   Topical BID    enoxaparin  1 mg/kg Subcutaneous BID    dextromethorphan-guaiFENesin  10 mL Oral 4 times per day    warfarin (COUMADIN) daily dosing (placeholder)   Other RX Placeholder    multivitamin  1 tablet Oral Daily    midodrine  5 mg Oral TID WC    sodium chloride flush  10 mL Intravenous 2 times per day    ipratropium-albuterol  1 ampule Inhalation Q4H WA    digoxin  125 mcg Oral Daily    dilTIAZem  120 mg Oral Daily    finasteride  5 mg Oral Daily    levothyroxine  75 mcg Oral Daily    tamsulosin  0.8 mg Oral Nightly     Continuous Infusions:    LABS:-  ABG:   No results for input(s): Result   Stable chest      Moderate left perihilar left basilar pleuroparenchymal process      Mild streaky right perihilar/right basilar atelectasis         XR CHEST (SINGLE VIEW FRONTAL)   Final Result   Persistent left lower lobe opacification concerning for pneumonia. Interval   improvement of right basilar infiltrate. Left perihilar atelectasis. XR CHEST PORTABLE   Final Result   Persistent findings of aspiration or pneumonia near the bases, appearing   worsened in the right base and decreased in the lingula. XR CHEST (2 VW)   Final Result   Similar findings to those seen on recent CT, compatible with pneumonia and   some basilar atelectasis/consolidation. CT CHEST PULMONARY EMBOLISM W CONTRAST   Final Result   No central or segmental pulmonary embolus. Bilateral lower lobe consolidation and scattered bilateral ground-glass   nodular opacities are likely on the basis of pneumonia. Recommend follow-up. XR CHEST PORTABLE   Final Result   Subtle patchy bilateral lung opacities increased from the prior study are   likely on the basis of pneumonia. Echocardiogram:   No results found for this or any previous visit. Cardiac Catheterization:   No results found for this or any previous visit. ASSESSMENT AND PLAN     Assessment:    // Acute hypoxemic respiratory failure, improving  // Bilateral multifocal pneumonia  // Paroxysmal atrial fibrillation  // Dementia  // Essential hypertension  // BPH  // Hypothyroidism    Plan:     Remains hemodynamically stable. Maintaining saturations on 3 L nasal cannula which is his home oxygen.  Continue supplemental oxygen to keep oxygen saturation greater than 92%   Agree with anticoagulation with Lovenox and bridging to warfarin. Management of anticoagulation for A. fib per primary. Pharmacy to dose warfarin.  Completed course of Unasyn today for aspiration pneumonia. Monitor off antibiotics.    Okay to be discharged from pulmonary standpoint after achieving therapeutic INR 2-3.  Follow-up with pulmonology in the office in 4 weeks. It was my pleasure to evaluate Jing You today. We will continue to follow. I would like to thank you for allowing me to participate in the care of this patient. Please feel free to call with any further questions or concerns. Roland Mccartney M.D.   Internal medicine resident, PGY 2  Pulmonary and Critical Care Medicine           1/8/2021

## 2021-01-08 NOTE — PROGRESS NOTES
Physical Therapy  Facility/Department: [address] ORTHO/MED SURG  Daily Treatment Note  NAME: Bart Gillis  : 1935  MRN: [id number]    Date of Service: 2021    Discharge Recommendations:  Patient would benefit from continued therapy after discharge   PT Equipment Recommendations  Equipment Needed: Yes  Walker: Rolling    Assessment   Body structures, Functions, Activity limitations: Decreased functional mobility ; Decreased strength;Decreased endurance;Decreased balance  Assessment: Pt performs bed mobility CGA, and all other functional mobility with Jeannie from therapist. Pt ambulated 60ft with RW and Jeannie on 4L O2 NC. Would recommend continued PT services to address deficits and to return to PLOF. Prognosis: Good  PT Education: PT Role;Plan of Care;General Safety;Goals;Transfer Training;Gait Training;Home Exercise Program;Functional Mobility Training  REQUIRES PT FOLLOW UP: Yes  Activity Tolerance  Activity Tolerance: Patient limited by fatigue;Patient limited by cognitive status; Patient limited by endurance     Patient Diagnosis(es): The primary encounter diagnosis was Pneumonia due to organism. Diagnoses of Warfarin-induced coagulopathy ([address] Elevated troponin, and Paroxysmal atrial fibrillation ([address] were also pertinent to this visit. has a past medical history of Atherosclerosis, Benign prostatic hyperplasia, Hypertension, Hypothyroidism, Osteoporosis, Paroxysmal A-fib ([address] Pneumonia, Rhabdomyolysis, Smoking, Spondylolisthesis, and Vitamin D deficiency. has a past surgical history that includes Ankle surgery; hip surgery; and Tonsillectomy and adenoidectomy. Restrictions  Restrictions/Precautions  Restrictions/Precautions: Fall Risk, Up as Tolerated  Required Braces or Orthoses?: No  Position Activity Restriction  Other position/activity restrictions: 4L O2 NC  Subjective   General  Chart Reviewed: Yes  Response To Previous Treatment: Patient with no complaints from previous session.   Family / Caregiver Present: No  Subjective  Subjective: RN and pt agreeable to PT this morning. Pt supine in bed upon arrival on 4L O2 NC and with no c/o pain. Pt less confused today, and eager to participate in therapy. Pain Screening  Patient Currently in Pain: Denies  Vital Signs  Patient Currently in Pain: Denies       Orientation  Orientation  Overall Orientation Status: Impaired  Orientation Level: Disoriented to time;Disoriented to place; Disoriented to situation;Oriented to person  Cognition   Cognition  Overall Cognitive Status: Exceptions  Arousal/Alertness: Delayed responses to stimuli  Following Commands: Follows one step commands with repetition; Follows one step commands with increased time  Memory: Decreased recall of precautions;Decreased recall of recent events;Decreased short term memory;Decreased recall of biographical Information  Safety Judgement: Decreased awareness of need for assistance;Decreased awareness of need for safety  Problem Solving: Decreased awareness of errors;Assistance required to identify errors made;Assistance required to generate solutions;Assistance required to implement solutions;Assistance required to correct errors made  Insights: Decreased awareness of deficits  Initiation: Requires cues for all  Sequencing: Requires cues for all  Objective   Bed mobility  Supine to Sit: Contact guard assistance  Sit to Supine: Contact guard assistance  Scooting: Contact guard assistance  Transfers  Sit to Stand: Minimal Assistance  Stand to sit: Contact guard assistance  Comment: Pt required VC's for hand placement during transfer with good return. Ambulation  Ambulation?: Yes  Ambulation 1  Surface: level tile  Device: Rolling Walker  Other Apparatus: O2(4L)  Assistance: Minimal assistance  Quality of Gait: LLE turns inward during gait.   Gait Deviations: Slow Chiara;Decreased step length;Decreased step height  Distance: 60ft  Stairs/Curb  Stairs?: No     Balance  Posture: Fair  Sitting - Static: Good  Sitting - Dynamic: Good;-  Standing - Static: Fair;-  Standing - Dynamic: Fair;-  Comments: standing balance assessed with RW. Exercises:  Supine Exercises: Ankle Pumps, Heel Slides, Hip ABD/ADD. Reps: 15x each LE  Seated LE exercise program: Long Arc Quads, hip abduction/adduction, heel/toe raises, and marches. Reps: 15x each LE  Comments: Pt required VC's for correct technique/sequence of LE exercises.       Goals  Short term goals  Time Frame for Short term goals: 14 visits  Short term goal 1: Pt will be Inessa bed mobility  Short term goal 2: Pt will be Inessa transfers  Short term goal 3: Pt will be Inessa amb 240' RW or least restrictive AD  Short term goal 4: Pt will navigate 2 steps Inessa    Plan    Plan  Times per week: 4- 5x/wk  Times per day: Daily  Current Treatment Recommendations: Strengthening, Endurance Training, Functional Mobility Training, Transfer Training, Gait Training, Balance Training, Stair training, Home Exercise Program, Safety Education & Training, Patient/Caregiver Education & Training, Equipment Evaluation, Education, & procurement  Safety Devices  Type of devices: Call light within reach, Gait belt, All fall risk precautions in place, Nurse notified, Patient at risk for falls, Left in bed, Bed alarm in place  Restraints  Initially in place: No     Therapy Time   Individual Concurrent Group Co-treatment   Time In 1035         Time Out 1058         Minutes 23         Timed Code Treatment Minutes: Sanjay 46, PTA

## 2021-01-08 NOTE — CARE COORDINATION
Transition planning  [facility] calls back for transportation time . Times available is only 8:30 or 400pm . 830 time booked.

## 2021-01-09 NOTE — PROGRESS NOTES
[facility] - Progress Note    Today's Date and Time: 1/9/2021, 6:44 AM    Impression :   · AFib (chronically anticoagulated with warfarin)  · HTN  · BPH  · Hypothyroidism  · Dementia   · Pressure wound on coccyx. · Aspiration pneumonia  · Single blood culture with Coagulase negative Staphylococci on 12/15/2020  · Covid tests:  · 12/14/2020 -negative  · 12/25/2020 negative    Recommendations:   · Monitor off antibiotics:  · Completed 7 days of unasyn 1/5/21  · Monitor clinical response off antibiotics  · Wound care    Medical Decision Making/Summary/Discussion:1/9/2021     ·   Infection Control Recommendations   · [place] Precautions  · Contact Isolation MRSA    Antimicrobial Stewardship Recommendations     · Simplification of therapy    Coordination of Outpatient Care:   · Estimated Length of IV antimicrobials:TBD  · Patient will need Midline Catheter Insertion: No  · Patient will need PICC line Insertion:No  · Patient will need: Home IV , [place],  SNF,  LTAC:TBD  · Patient will need outpatient wound care:Yes    Chief complaint/reason for consultation:   · Pneumonia  · Sacral decubiti    History of Present Illness:   Elizabeth Monroe is a 82y.o.-year-old white male who was initially admitted on 12/25/2020. Patient seen at the request of Dr. Cyril Mckay DO.    INITIAL HISTORY:    This is a patient with AFib (chronically anticoagulated with warfarin), HTN, BPH, hypothyroidism, dementia who presents from [facility] ER with shortness of breath. Patient is a poor historian, thus most information is collected from EMR. EMS was called from patient's nursing facility because of increased shortness of breath. Patient uses 4 L oxygen at home,  he was saturating  85% on 4L. Patient was recently discharged from hospital on 12/18/2020 after treatment for pneumonia and supra therapeutic INR. Patient was discharged on PO doxycycline.   CXR done at [facility] read as worsening pneumonia. WBC elevated at 21.6>20.6, lactic acid 3.5,Trop: 32>30>29, pro-bnp: 488 D-Dimer: 1.04. CT Chest negative for PE and COVID-19 swab negative. Patient was given 1 dose of IV Zosyn and Cipro in the emergency room   Patient was transferred to [facility]. Vs for continued management of Pneumonia.      CURRENT EVALUATION 01/09/21    Afebrile  VS stable    The patient seen and evaluated at bedside. Patient is about the same with no new acute issues or concerns today. Patient does not have any fevers, chills, chest pains or palpitations. He notes mild cough and some shortness of breath. He remains somewhat confused. RR 20-->14  On 4-->3 L NC  02 sat 98-->97    Residual wheezing on lung auscultation. Pulmonary cleared pt for discharge. Monitor off antibiotics  · 7 day course of unasyn completed 1/5/21    CXR:   · 12/17/20: Subtle opacities from pneumonia  · 12/26/20 Pneumonia with basilar consolidation or atelectasis       CT chest 12/25/20  Bilateral lower lobe consolidation and scattered bilateral ground-glass   nodular opacities are likely on the basis of pneumonia       Labs, X rays reviewed: 1/9/2021    BUN:25>12-->16-->19  Cr:1.16>0.83-->0.82-->0.92-->0.87    WBC: 12.0-->9.2-->--8.6>7.3-->9.4-->10.8-->11.0  Hb: 9.5-->9.6-->9.5-->9.1-->8.8-->8.7-->9.5-->8.7  Plat: 320-->286-->257-->222-->236-->280-->316    Cultures:  Urine:  · Strept pneumonia antigen negative   · Legionella antigen negative    Blood:   · 12/25/20- no growth for 6 days  ·   Sputum :  ·   Wound:  ·     Discussed with patient, RN        I have personally reviewed the past medical history, past surgical history, medications, social history, and family history, and I have updated the database accordingly.   Past Medical History:     Past Medical History:   Diagnosis Date    Atherosclerosis     Benign prostatic hyperplasia     Hypertension     Hypothyroidism     Osteoporosis     Paroxysmal A-fib [address] [address] [facility]     organization: Not on file     Attends meetings of clubs or organizations: Not on file     Relationship status: Not on file    Intimate partner violence     Fear of current or ex partner: Not on file     Emotionally abused: Not on file     Physically abused: Not on file     Forced sexual activity: Not on file   Other Topics Concern    Not on file   Social History Narrative    Not on file       Family History:   History reviewed. No pertinent family history. Allergies:   Patient has no known allergies. Review of Systems:   Unable to obtain much history from patient. Physical Examination :     Patient Vitals for the past 8 hrs:   BP Temp Temp src Pulse Resp SpO2   01/09/21 0040 108/71 97.7 °F (36.5 °C) Oral 137 14 97 %     General Appearance: Awake, alert, confused (not oriented to location)  Head:  Normocephalic, no trauma  Eyes: Pupils equal, round, reactive to light and accommodation; extraocular movements intact; sclera anicteric; conjunctivae pink. No embolic phenomena. ENT: Oropharynx clear, without erythema, exudate, or thrush. No tenderness of sinuses. Mouth/throat: mucosa pink and moist. No lesions. Dentition in good repair. Neck:Supple, without lymphadenopathy. Thyroid normal, No bruits. Pulmonary/Chest: expiratory wheezing. No dullness to percussion. Cardiovascular: irregular rhythm without murmurs, rubs, or gallops. Abdomen: Soft, non tender. Bowel sounds normal. No organomegaly  All four Extremities: No cyanosis, clubbing, edema, or effusions. Neurologic: No gross sensory or motor deficits. Skin: Warm and dry with good turgor. No signs of peripheral arterial or venous insufficiency. No ulcerations. No open wounds.     Medical Decision Making -Laboratory:   I have independently reviewed/ordered the following labs:    CBC with Differential:   Recent Labs     01/07/21  0631 01/08/21  0555   WBC 11.0 8.2   HGB 8.7* 8.9*   HCT 28.2* 30.4*    314   LYMPHOPCT 18* 16*   MONOPCT 4 3 BMP:   Recent Labs     21  0631 21  0555    138   K 3.8 3.9    103   CO2 27 28   BUN 19 19   CREATININE 0.87 0.79   MG 2.1 2.1     Hepatic Function Panel:   Recent Labs     21  0631 21  0555   LABALBU 2.8* 2.9*     No results for input(s): RPR in the last 72 hours. No results for input(s): HIV in the last 72 hours. No results for input(s): BC in the last 72 hours. Lab Results   Component Value Date    MUCUS NOT REPORTED 2020    RBC 3.13 2021    TRICHOMONAS NOT REPORTED 2020    WBC 8.2 2021    YEAST NOT REPORTED 2020    TURBIDITY CLEAR 2020     Lab Results   Component Value Date    CREATININE 0.79 2021    GLUCOSE 91 2021       Medical Decision Making-Imagin20    Medical Decision [id number]:       Medical Decision Making-Other:     Note:  · Labs, medications, radiologic studies were reviewed with personal review of films  · Large amounts of data were reviewed  · Discussed with nursing Staff, Discharge planner  · Infection Control and Prevention measures reviewed  · All prior entries were reviewed  · Administer medications as ordered  · Prognosis: Guarded  · Discharge planning reviewed  · Follow up as outpatient.     Kylee Witt MD

## 2021-01-11 NOTE — TELEPHONE ENCOUNTER
----- Message from Ozzie Zacarias sent at 1/11/2021  7:03 AM EST -----  Arvil Phlegm, please see message from Dr India Peoples and call to schedule. Thank you.   ----- Message -----  From: Jhonny Hagan MD  Sent: 1/8/2021   5:49 PM EST  To: [facility] Clinical Staff     Please have the patient follow-up with Dr. Rajwinder Donato upon discharge in 4 to 6 weeks for pneumonia.   Thank you

## 2021-01-11 NOTE — TELEPHONE ENCOUNTER
Sent a letter to pt's home address on file because the phone number that is in the chart is a facility that the pt has not been at since 2010.

## 2021-01-18 NOTE — TELEPHONE ENCOUNTER
Daughter called office after receiving letter that was mailed. She will ask [facility] to call office to schedule appt.

## 2021-10-04 NOTE — PROGRESS NOTES
Patient calm and cooperative at this time. Assessment and medications completed (See flowsheets and MAR). Patient O2 92% on 4L NL. Will continue to monitor. Written/documented by Clarence Beatty, acting as a scribe in Dr. Hennessy's presence.    SUBJECTIVE:  The patient is 71 year old, presenting today for annual wellness visit.  As he declined to complete HRA form, this was an office visit. He walks 2.5 miles daily, plays golf once a week, and does yard work regularly. He was noted to be anemic with hgb low at 11.0 on labs. He denies any recent bleeding including black or bloody stools. He tolerated the addition of amlodipine 5mg daily. His bp at home is 130s/80s.       MEDICATIONS:  Current Outpatient Medications   Medication Sig Dispense Refill   • Accu-Chek FastClix Lancets Misc Test 1 x daily Dx E11.9 Brand Accu Check Paulina Plus test strips Accu-Check Fastclix Insulin dependent No Last A1C 6.6 102 each 11   • blood glucose (Accu-Chek Paulina Plus) test strip daily. Test 1 x daily Dx E11.9 Brand Accu Check Paulina Plus test strips Accu-Check Fastclix Insulin dependent No Last A1C 6.6 100 strip 11   • omeprazole (PrilOSEC) 20 MG capsule Take 1 capsule by mouth daily. 90 capsule 0   • lisinopril (ZESTRIL) 40 MG tablet TAKE 1 TABLET BY MOUTH EVERY DAY 90 tablet 0   • atorvastatin (LIPITOR) 20 MG tablet TAKE 1 TABLET BY MOUTH EVERY DAY 90 tablet 3   • glimepiride (AMARYL) 4 MG tablet TAKE 2 TABLETS BY MOUTH EVERY  tablet 3   • nateglinide (STARLIX) 120 MG tablet Take 1 tablet by mouth 2 times daily (before meals). 180 tablet 3   • amLODIPine (NORVASC) 5 MG tablet Take 1 tablet by mouth daily. 90 tablet 3   • metformin (GLUCOPHAGE) 1000 MG tablet Take 1 tablet by mouth 2 times daily (with meals). 180 tablet 1   • aspirin (ASPIRIN LOW DOSE) 81 MG tablet 1 TABLET DAILY     • Blood Glucose Monitoring Suppl (ACCU-CHEK PAULINA PLUS) w/Device Kit Meter to test 4 times daily diagnosis E11.9     • Lancet Device Misc accuchek fastclix joselito device. Test daily DX: E11.21 non insulin dependent       No current facility-administered medications for this visit.       ALLERGIES:    Allergen Reactions   • Simvastatin Other (See Comments)     Muscle/Joint Ache        OBJECTIVE:  Visit Vitals  BP (!) 146/88   Pulse 78   Temp 97.4 °F (36.3 °C) (Temporal)   Resp 18   Ht 5' 6\" (1.676 m)   Wt 75.8 kg (167 lb)   BMI 26.95 kg/m²     Physical Exam:  General:  Alert, oriented, in no acute distress.   Lungs:  Clear.  No wheezing, rhonchi, or crackle.   Cardiac:  Regular rate and rhythm.   Extremities:  No pitting edema.     Laboratory Data:   Lab Results   Component Value Date    HEMOGLOBIN 11.0 (L) 10/01/2021    HEMATOCRIT 35.4 (L) 10/01/2021    MNCRPUSCLVLM 90.8 10/01/2021    WHTBLDCLCT 11.6 (H) 10/01/2021    NEUABSNM 7.5 (H) 10/01/2021    LYMPHOCYTE 2.4 10/01/2021    EOSINOPHILA 0.7 (H) 10/01/2021    PLATELETCOUN 341 10/01/2021     Lab Results   Component Value Date    HEMOGLOBINAO 6.7 (H) 10/01/2021    MICALBCRTRT 1,163.2 (H) 10/01/2021     Lab Results   Component Value Date    GLUCOSEFASTI 103 (H) 10/01/2021    BLOODUREANIT 17 10/01/2021    CREATININESE 1.1 10/01/2021    CALCIUMSERUM 10.6 10/01/2021    NASODIUMSERU 144 10/01/2021    KPOTASSIUMSE 3.5 10/01/2021    CHLORIDESERU 103 10/01/2021    ALKALINEPHOS 104 03/18/2021    ALANINEAMINO 9 03/18/2021    ASPARTATEAMI 18 03/18/2021    LD2XYWYEW 26 10/01/2021    EGFR* >60 10/01/2021    EGFR*NONAFRI >60 10/01/2021     Lab Results   Component Value Date    CHOLESTEROLT 135 (L) 10/01/2021    HDLCHOLESTER 46 10/01/2021    LDLCHOLCALCU 69 10/01/2021    TRIGLYCERIDE 99 10/01/2021     Lab Results   Component Value Date    PSATOTALDIAG 2.10 10/01/2021         ASSESSMENT/PLAN:  1. Hypertension. bp at 150/82 in office, reportedly 130s/80s at home. Pt would like to hold off on increasing dose of amlodipine. Continue lisinopril 40mg daily and amlodipine 5mg daily. To monitor   2. Diabetes mellitus. A1c at 6.7. fasting glucose minimally high at 103. Continue with metformin 1,000 mg BID, glimepiride 8 mg daily, and Starlix 120 mg BID. Continue with diet and  exercise.  3. Hyperlipidemia. Numbers are within goal. Continue with atorvastatin, diet, and exercise.   4. Microalbuminuria, proteinuria . Higher at 1,163.2. pt is on max dose of lisinopril. Will have pt see nephrology for further management.   5. History of mild anemia. Likely related to diabetes and consistent with anemia of chronic disease.  Hemoglobin low at 11.0 (baseline upper 12, low 13). Will check vit b12, folic acid, iron levels  6. Eosinophilia. Trending down, now at 0.7.  Evaluated by allergy department.  Plan to monitor q 6 months and to refer back to allergy if eos >1.5 (1500).  7. History of upward trending PSA. Improved from 2.30 to 2.10.  8. Plan to receive pfizer covid booster. Declines flu vaccine.  9. Patient declines to complete Medicare HRA form.   10. We will see him back in 6 months for followup.     Scribe: Electronically signed: Clarence Beatty has scribed for Dr. Hennessy 10/4/2021     I have reviewed and edited the progress note and agree with what has been scribed. Electronically signed by: Michael Hennessy MD 10/4/2021